# Patient Record
Sex: MALE | Race: BLACK OR AFRICAN AMERICAN | Employment: FULL TIME | URBAN - METROPOLITAN AREA
[De-identification: names, ages, dates, MRNs, and addresses within clinical notes are randomized per-mention and may not be internally consistent; named-entity substitution may affect disease eponyms.]

---

## 2017-02-01 ENCOUNTER — OFFICE VISIT (OUTPATIENT)
Dept: INTERNAL MEDICINE CLINIC | Age: 57
End: 2017-02-01

## 2017-02-01 VITALS
SYSTOLIC BLOOD PRESSURE: 140 MMHG | HEIGHT: 66 IN | DIASTOLIC BLOOD PRESSURE: 90 MMHG | BODY MASS INDEX: 23.01 KG/M2 | HEART RATE: 72 BPM | TEMPERATURE: 98.1 F | WEIGHT: 143.2 LBS

## 2017-02-01 DIAGNOSIS — M25.562 CHRONIC PAIN OF LEFT KNEE: ICD-10-CM

## 2017-02-01 DIAGNOSIS — N52.9 ERECTILE DYSFUNCTION, UNSPECIFIED ERECTILE DYSFUNCTION TYPE: ICD-10-CM

## 2017-02-01 DIAGNOSIS — M25.512 CHRONIC LEFT SHOULDER PAIN: ICD-10-CM

## 2017-02-01 DIAGNOSIS — I10 ESSENTIAL HYPERTENSION: Primary | ICD-10-CM

## 2017-02-01 DIAGNOSIS — G89.29 CHRONIC LEFT SHOULDER PAIN: ICD-10-CM

## 2017-02-01 DIAGNOSIS — Z13.1 SCREENING FOR DIABETES MELLITUS: ICD-10-CM

## 2017-02-01 DIAGNOSIS — M79.672 PAIN IN BOTH FEET: ICD-10-CM

## 2017-02-01 DIAGNOSIS — M79.671 PAIN IN BOTH FEET: ICD-10-CM

## 2017-02-01 DIAGNOSIS — G89.29 CHRONIC PAIN OF LEFT KNEE: ICD-10-CM

## 2017-02-01 LAB
GLUCOSE BLD-MCNC: 90 MG/DL
HBA1C MFR BLD: 4.7 %

## 2017-02-01 PROCEDURE — 99214 OFFICE O/P EST MOD 30 MIN: CPT | Performed by: NURSE PRACTITIONER

## 2017-02-01 PROCEDURE — 83036 HEMOGLOBIN GLYCOSYLATED A1C: CPT | Performed by: NURSE PRACTITIONER

## 2017-02-01 PROCEDURE — 82962 GLUCOSE BLOOD TEST: CPT | Performed by: NURSE PRACTITIONER

## 2017-02-01 RX ORDER — LISINOPRIL 20 MG/1
20 TABLET ORAL DAILY
Qty: 30 TABLET | Refills: 3 | Status: SHIPPED | OUTPATIENT
Start: 2017-02-01 | End: 2017-03-03 | Stop reason: SDUPTHER

## 2017-02-01 RX ORDER — BLOOD PRESSURE TEST KIT
KIT MISCELLANEOUS
Qty: 1 KIT | Refills: 0 | Status: SHIPPED | OUTPATIENT
Start: 2017-02-01 | End: 2017-08-01 | Stop reason: SDUPTHER

## 2017-02-01 RX ORDER — CHOLECALCIFEROL (VITAMIN D3) 50 MCG
2000 TABLET ORAL DAILY
COMMUNITY
End: 2017-03-03 | Stop reason: SDUPTHER

## 2017-02-01 RX ORDER — LABETALOL 300 MG/1
300 TABLET, FILM COATED ORAL DAILY
Qty: 30 TABLET | Refills: 2 | Status: SHIPPED | OUTPATIENT
Start: 2017-02-01 | End: 2017-03-03 | Stop reason: SDUPTHER

## 2017-02-01 ASSESSMENT — ENCOUNTER SYMPTOMS
ALLERGIC/IMMUNOLOGIC NEGATIVE: 1
RESPIRATORY NEGATIVE: 1
GASTROINTESTINAL NEGATIVE: 1
EYES NEGATIVE: 1

## 2017-02-02 LAB
SEX HORMONE BINDING GLOBULIN: 50 NMOL/L (ref 11–80)
TESTOSTERONE FREE PERCENT: 1.5 % (ref 1.6–2.9)
TESTOSTERONE FREE, CALC: 73 PG/ML (ref 47–244)
TESTOSTERONE TOTAL-MALE: 498 NG/DL (ref 300–890)

## 2017-02-03 ENCOUNTER — TELEPHONE (OUTPATIENT)
Dept: INTERNAL MEDICINE CLINIC | Age: 57
End: 2017-02-03

## 2017-02-03 DIAGNOSIS — R79.89 LOW TESTOSTERONE LEVEL IN MALE: ICD-10-CM

## 2017-03-03 ENCOUNTER — OFFICE VISIT (OUTPATIENT)
Dept: INTERNAL MEDICINE CLINIC | Age: 57
End: 2017-03-03

## 2017-03-03 VITALS
OXYGEN SATURATION: 98 % | WEIGHT: 146 LBS | SYSTOLIC BLOOD PRESSURE: 156 MMHG | BODY MASS INDEX: 23.46 KG/M2 | HEIGHT: 66 IN | HEART RATE: 58 BPM | DIASTOLIC BLOOD PRESSURE: 92 MMHG

## 2017-03-03 DIAGNOSIS — I10 ESSENTIAL HYPERTENSION: Primary | ICD-10-CM

## 2017-03-03 DIAGNOSIS — M19.90 ARTHRITIS: ICD-10-CM

## 2017-03-03 DIAGNOSIS — K21.9 GASTROESOPHAGEAL REFLUX DISEASE WITHOUT ESOPHAGITIS: ICD-10-CM

## 2017-03-03 PROCEDURE — 99214 OFFICE O/P EST MOD 30 MIN: CPT | Performed by: NURSE PRACTITIONER

## 2017-03-03 RX ORDER — AMLODIPINE BESYLATE 10 MG/1
10 TABLET ORAL DAILY
Qty: 30 TABLET | Refills: 3 | Status: SHIPPED | OUTPATIENT
Start: 2017-03-03 | End: 2017-04-10 | Stop reason: SDUPTHER

## 2017-03-03 RX ORDER — PANTOPRAZOLE SODIUM 40 MG/1
40 TABLET, DELAYED RELEASE ORAL
Qty: 30 TABLET | Refills: 3 | Status: SHIPPED | OUTPATIENT
Start: 2017-03-03 | End: 2017-04-10 | Stop reason: SDUPTHER

## 2017-03-03 RX ORDER — LABETALOL 300 MG/1
300 TABLET, FILM COATED ORAL DAILY
Qty: 30 TABLET | Refills: 2 | Status: SHIPPED | OUTPATIENT
Start: 2017-03-03 | End: 2017-04-10 | Stop reason: SDUPTHER

## 2017-03-03 RX ORDER — NICOTINE 21 MG/24HR
1 PATCH, TRANSDERMAL 24 HOURS TRANSDERMAL DAILY
Qty: 10 PATCH | Refills: 0 | Status: SHIPPED | OUTPATIENT
Start: 2017-03-03 | End: 2019-03-04 | Stop reason: SDUPTHER

## 2017-03-03 RX ORDER — LISINOPRIL 20 MG/1
20 TABLET ORAL DAILY
Qty: 30 TABLET | Refills: 3 | Status: SHIPPED | OUTPATIENT
Start: 2017-03-03 | End: 2017-04-10 | Stop reason: SDUPTHER

## 2017-03-03 RX ORDER — CHOLECALCIFEROL (VITAMIN D3) 50 MCG
2000 TABLET ORAL DAILY
Qty: 30 TABLET | Refills: 2 | Status: SHIPPED | OUTPATIENT
Start: 2017-03-03 | End: 2017-04-10 | Stop reason: SDUPTHER

## 2017-03-03 ASSESSMENT — ENCOUNTER SYMPTOMS
DIARRHEA: 0
SHORTNESS OF BREATH: 0
ABDOMINAL DISTENTION: 0
HEARTBURN: 1
NAUSEA: 0
ALLERGIC/IMMUNOLOGIC NEGATIVE: 1
BLURRED VISION: 0
EYES NEGATIVE: 1

## 2017-03-03 ASSESSMENT — PATIENT HEALTH QUESTIONNAIRE - PHQ9
2. FEELING DOWN, DEPRESSED OR HOPELESS: 0
SUM OF ALL RESPONSES TO PHQ QUESTIONS 1-9: 0
1. LITTLE INTEREST OR PLEASURE IN DOING THINGS: 0
SUM OF ALL RESPONSES TO PHQ9 QUESTIONS 1 & 2: 0

## 2017-04-10 ENCOUNTER — OFFICE VISIT (OUTPATIENT)
Dept: INTERNAL MEDICINE CLINIC | Age: 57
End: 2017-04-10

## 2017-04-10 VITALS
TEMPERATURE: 98.4 F | WEIGHT: 145 LBS | HEIGHT: 66 IN | SYSTOLIC BLOOD PRESSURE: 130 MMHG | DIASTOLIC BLOOD PRESSURE: 72 MMHG | BODY MASS INDEX: 23.3 KG/M2 | HEART RATE: 64 BPM

## 2017-04-10 DIAGNOSIS — I10 ESSENTIAL HYPERTENSION: Primary | ICD-10-CM

## 2017-04-10 DIAGNOSIS — M19.90 ARTHRITIS: ICD-10-CM

## 2017-04-10 DIAGNOSIS — K21.9 GASTROESOPHAGEAL REFLUX DISEASE WITHOUT ESOPHAGITIS: ICD-10-CM

## 2017-04-10 DIAGNOSIS — E55.9 VITAMIN D DEFICIENCY: ICD-10-CM

## 2017-04-10 PROCEDURE — 99214 OFFICE O/P EST MOD 30 MIN: CPT | Performed by: NURSE PRACTITIONER

## 2017-04-10 RX ORDER — PANTOPRAZOLE SODIUM 40 MG/1
40 TABLET, DELAYED RELEASE ORAL
Qty: 30 TABLET | Refills: 3 | Status: SHIPPED | OUTPATIENT
Start: 2017-04-10 | End: 2017-08-01 | Stop reason: SDUPTHER

## 2017-04-10 RX ORDER — MELOXICAM 7.5 MG/1
7.5 TABLET ORAL DAILY
Qty: 30 TABLET | Refills: 1 | Status: SHIPPED | OUTPATIENT
Start: 2017-04-10 | End: 2017-08-01 | Stop reason: SDUPTHER

## 2017-04-10 RX ORDER — BLOOD PRESSURE TEST KIT
KIT MISCELLANEOUS
Qty: 1 KIT | Refills: 0 | Status: SHIPPED | OUTPATIENT
Start: 2017-04-10 | End: 2019-01-16 | Stop reason: ALTCHOICE

## 2017-04-10 RX ORDER — LABETALOL 300 MG/1
300 TABLET, FILM COATED ORAL DAILY
Qty: 30 TABLET | Refills: 2 | Status: SHIPPED | OUTPATIENT
Start: 2017-04-10 | End: 2017-08-01 | Stop reason: SDUPTHER

## 2017-04-10 RX ORDER — TADALAFIL 5 MG/1
5 TABLET ORAL PRN
Qty: 30 TABLET | Refills: 2 | Status: SHIPPED | OUTPATIENT
Start: 2017-04-10 | End: 2018-12-06 | Stop reason: SDUPTHER

## 2017-04-10 RX ORDER — CHOLECALCIFEROL (VITAMIN D3) 50 MCG
2000 TABLET ORAL DAILY
Qty: 30 TABLET | Refills: 2 | Status: SHIPPED | OUTPATIENT
Start: 2017-04-10 | End: 2017-08-01 | Stop reason: SDUPTHER

## 2017-04-10 RX ORDER — LISINOPRIL 20 MG/1
20 TABLET ORAL DAILY
Qty: 30 TABLET | Refills: 3 | Status: SHIPPED | OUTPATIENT
Start: 2017-04-10 | End: 2017-08-01 | Stop reason: SDUPTHER

## 2017-04-10 RX ORDER — AMLODIPINE BESYLATE 10 MG/1
10 TABLET ORAL DAILY
Qty: 30 TABLET | Refills: 3 | Status: SHIPPED | OUTPATIENT
Start: 2017-04-10 | End: 2017-08-01 | Stop reason: SDUPTHER

## 2017-04-10 RX ORDER — MELOXICAM 7.5 MG/1
1 TABLET ORAL DAILY
COMMUNITY
Start: 2017-04-04 | End: 2017-04-10 | Stop reason: SDUPTHER

## 2017-04-10 ASSESSMENT — ENCOUNTER SYMPTOMS
RESPIRATORY NEGATIVE: 1
EYES NEGATIVE: 1
GASTROINTESTINAL NEGATIVE: 1
ALLERGIC/IMMUNOLOGIC NEGATIVE: 1

## 2017-04-11 DIAGNOSIS — I10 ESSENTIAL HYPERTENSION: Primary | ICD-10-CM

## 2017-08-01 ENCOUNTER — OFFICE VISIT (OUTPATIENT)
Dept: INTERNAL MEDICINE CLINIC | Age: 57
End: 2017-08-01

## 2017-08-01 VITALS
BODY MASS INDEX: 23.76 KG/M2 | SYSTOLIC BLOOD PRESSURE: 140 MMHG | DIASTOLIC BLOOD PRESSURE: 88 MMHG | WEIGHT: 142.6 LBS | HEART RATE: 64 BPM | TEMPERATURE: 98.3 F | HEIGHT: 65 IN

## 2017-08-01 DIAGNOSIS — Z00.00 HEALTH CARE MAINTENANCE: ICD-10-CM

## 2017-08-01 DIAGNOSIS — I10 ESSENTIAL HYPERTENSION: Primary | ICD-10-CM

## 2017-08-01 DIAGNOSIS — I10 ESSENTIAL HYPERTENSION: ICD-10-CM

## 2017-08-01 DIAGNOSIS — E55.9 VITAMIN D DEFICIENCY: ICD-10-CM

## 2017-08-01 DIAGNOSIS — K21.9 GASTROESOPHAGEAL REFLUX DISEASE WITHOUT ESOPHAGITIS: ICD-10-CM

## 2017-08-01 DIAGNOSIS — Z13.1 SCREENING FOR DIABETES MELLITUS: ICD-10-CM

## 2017-08-01 DIAGNOSIS — F17.210 CIGARETTE NICOTINE DEPENDENCE WITHOUT COMPLICATION: ICD-10-CM

## 2017-08-01 DIAGNOSIS — M19.90 ARTHRITIS: ICD-10-CM

## 2017-08-01 LAB
A/G RATIO: 1.8 (ref 1.1–2.2)
ALBUMIN SERPL-MCNC: 4.7 G/DL (ref 3.4–5)
ALP BLD-CCNC: 78 U/L (ref 40–129)
ALT SERPL-CCNC: 15 U/L (ref 10–40)
ANION GAP SERPL CALCULATED.3IONS-SCNC: 16 MMOL/L (ref 3–16)
AST SERPL-CCNC: 23 U/L (ref 15–37)
BILIRUB SERPL-MCNC: 0.3 MG/DL (ref 0–1)
BUN BLDV-MCNC: 19 MG/DL (ref 7–20)
CALCIUM SERPL-MCNC: 9.9 MG/DL (ref 8.3–10.6)
CHLORIDE BLD-SCNC: 99 MMOL/L (ref 99–110)
CHOLESTEROL, TOTAL: 168 MG/DL (ref 0–199)
CO2: 24 MMOL/L (ref 21–32)
CREAT SERPL-MCNC: 0.7 MG/DL (ref 0.9–1.3)
CREATININE URINE: 118.9 MG/DL (ref 39–259)
GFR AFRICAN AMERICAN: >60
GFR NON-AFRICAN AMERICAN: >60
GLOBULIN: 2.6 G/DL
GLUCOSE BLD-MCNC: 85 MG/DL (ref 70–99)
HAV IGM SER IA-ACNC: NORMAL
HCT VFR BLD CALC: 39.2 % (ref 40.5–52.5)
HDLC SERPL-MCNC: 65 MG/DL (ref 40–60)
HEMOGLOBIN: 12.9 G/DL (ref 13.5–17.5)
HEPATITIS B CORE IGM ANTIBODY: NORMAL
HEPATITIS B SURFACE ANTIGEN INTERPRETATION: NORMAL
HEPATITIS C ANTIBODY INTERPRETATION: NORMAL
LDL CHOLESTEROL CALCULATED: 87 MG/DL
MCH RBC QN AUTO: 30.1 PG (ref 26–34)
MCHC RBC AUTO-ENTMCNC: 32.8 G/DL (ref 31–36)
MCV RBC AUTO: 91.9 FL (ref 80–100)
MICROALBUMIN UR-MCNC: <1.2 MG/DL
MICROALBUMIN/CREAT UR-RTO: NORMAL MG/G (ref 0–30)
PDW BLD-RTO: 14.6 % (ref 12.4–15.4)
PLATELET # BLD: 182 K/UL (ref 135–450)
PMV BLD AUTO: 10.5 FL (ref 5–10.5)
POTASSIUM SERPL-SCNC: 4.3 MMOL/L (ref 3.5–5.1)
RBC # BLD: 4.27 M/UL (ref 4.2–5.9)
SODIUM BLD-SCNC: 139 MMOL/L (ref 136–145)
TOTAL PROTEIN: 7.3 G/DL (ref 6.4–8.2)
TRIGL SERPL-MCNC: 82 MG/DL (ref 0–150)
VITAMIN D 25-HYDROXY: 48.2 NG/ML
VLDLC SERPL CALC-MCNC: 16 MG/DL
WBC # BLD: 6.1 K/UL (ref 4–11)

## 2017-08-01 PROCEDURE — 99214 OFFICE O/P EST MOD 30 MIN: CPT | Performed by: NURSE PRACTITIONER

## 2017-08-01 RX ORDER — CHOLECALCIFEROL (VITAMIN D3) 50 MCG
2000 TABLET ORAL DAILY
Qty: 30 TABLET | Refills: 2 | Status: SHIPPED | OUTPATIENT
Start: 2017-08-01 | End: 2018-01-16 | Stop reason: SDUPTHER

## 2017-08-01 RX ORDER — LABETALOL 300 MG/1
300 TABLET, FILM COATED ORAL DAILY
Qty: 30 TABLET | Refills: 2 | Status: SHIPPED | OUTPATIENT
Start: 2017-08-01 | End: 2018-02-01 | Stop reason: SDUPTHER

## 2017-08-01 RX ORDER — PANTOPRAZOLE SODIUM 40 MG/1
40 TABLET, DELAYED RELEASE ORAL
Qty: 30 TABLET | Refills: 3 | Status: SHIPPED | OUTPATIENT
Start: 2017-08-01 | End: 2018-02-01

## 2017-08-01 RX ORDER — AMLODIPINE BESYLATE 10 MG/1
10 TABLET ORAL DAILY
Qty: 30 TABLET | Refills: 3 | Status: SHIPPED | OUTPATIENT
Start: 2017-08-01 | End: 2018-02-01 | Stop reason: SDUPTHER

## 2017-08-01 RX ORDER — MELOXICAM 7.5 MG/1
7.5 TABLET ORAL DAILY
Qty: 30 TABLET | Refills: 1 | Status: SHIPPED | OUTPATIENT
Start: 2017-08-01 | End: 2018-01-16 | Stop reason: SDUPTHER

## 2017-08-01 RX ORDER — LISINOPRIL 20 MG/1
20 TABLET ORAL DAILY
Qty: 30 TABLET | Refills: 3 | Status: SHIPPED | OUTPATIENT
Start: 2017-08-01 | End: 2018-01-16 | Stop reason: SDUPTHER

## 2017-08-01 ASSESSMENT — ENCOUNTER SYMPTOMS
WHEEZING: 0
ALLERGIC/IMMUNOLOGIC NEGATIVE: 1
ABDOMINAL DISTENTION: 0
COUGH: 0
CHEST TIGHTNESS: 0
ABDOMINAL PAIN: 0
SORE THROAT: 0
SHORTNESS OF BREATH: 0
DIARRHEA: 0
HOARSE VOICE: 0
STRIDOR: 0
APNEA: 0
BELCHING: 0
NAUSEA: 0
HEARTBURN: 0

## 2017-08-02 LAB — HIV-1 AND HIV-2 ANTIBODIES: NORMAL

## 2017-08-03 ENCOUNTER — TELEPHONE (OUTPATIENT)
Dept: INTERNAL MEDICINE CLINIC | Age: 57
End: 2017-08-03

## 2017-08-03 LAB
PROSTATE SPECIFIC ANTIGEN FREE: <0.1 UG/L
PROSTATE SPECIFIC ANTIGEN PERCENT FREE: 20 %
PROSTATE SPECIFIC ANTIGEN: 0.2 UG/L (ref 0–4)

## 2017-08-24 DIAGNOSIS — I10 ESSENTIAL HYPERTENSION: ICD-10-CM

## 2017-09-14 ENCOUNTER — OFFICE VISIT (OUTPATIENT)
Dept: INTERNAL MEDICINE CLINIC | Age: 57
End: 2017-09-14

## 2017-09-14 VITALS
OXYGEN SATURATION: 97 % | WEIGHT: 143.6 LBS | HEART RATE: 59 BPM | DIASTOLIC BLOOD PRESSURE: 72 MMHG | BODY MASS INDEX: 23.93 KG/M2 | RESPIRATION RATE: 20 BRPM | SYSTOLIC BLOOD PRESSURE: 132 MMHG | HEIGHT: 65 IN | TEMPERATURE: 98.3 F

## 2017-09-14 DIAGNOSIS — R09.89 CHEST CONGESTION: Primary | ICD-10-CM

## 2017-09-14 DIAGNOSIS — R05.9 COUGH: ICD-10-CM

## 2017-09-14 DIAGNOSIS — Z72.0 TOBACCO USER: ICD-10-CM

## 2017-09-14 PROCEDURE — 99213 OFFICE O/P EST LOW 20 MIN: CPT | Performed by: NURSE PRACTITIONER

## 2017-09-14 RX ORDER — GUAIFENESIN 600 MG/1
600 TABLET, EXTENDED RELEASE ORAL 2 TIMES DAILY
Qty: 20 TABLET | Refills: 0 | Status: SHIPPED | OUTPATIENT
Start: 2017-09-14 | End: 2017-10-13 | Stop reason: SDUPTHER

## 2017-09-14 RX ORDER — CETIRIZINE HYDROCHLORIDE, PSEUDOEPHEDRINE HYDROCHLORIDE 5; 120 MG/1; MG/1
1 TABLET, FILM COATED, EXTENDED RELEASE ORAL 2 TIMES DAILY
Qty: 20 TABLET | Refills: 0 | Status: SHIPPED | OUTPATIENT
Start: 2017-09-14 | End: 2017-09-24

## 2017-09-14 RX ORDER — CETIRIZINE HYDROCHLORIDE, PSEUDOEPHEDRINE HYDROCHLORIDE 5; 120 MG/1; MG/1
1 TABLET, FILM COATED, EXTENDED RELEASE ORAL 2 TIMES DAILY
Qty: 60 TABLET | Refills: 0 | Status: SHIPPED | OUTPATIENT
Start: 2017-09-14 | End: 2017-09-14 | Stop reason: CLARIF

## 2017-09-14 RX ORDER — ECHINACEA PURPUREA EXTRACT 125 MG
1 TABLET ORAL PRN
Qty: 1 BOTTLE | Refills: 3 | Status: SHIPPED | OUTPATIENT
Start: 2017-09-14 | End: 2019-01-16 | Stop reason: ALTCHOICE

## 2017-09-14 ASSESSMENT — ENCOUNTER SYMPTOMS
VOICE CHANGE: 0
SINUS PRESSURE: 1
APNEA: 0
STRIDOR: 0
WHEEZING: 0
SHORTNESS OF BREATH: 0
CHEST TIGHTNESS: 0
COUGH: 1
FACIAL SWELLING: 0
TROUBLE SWALLOWING: 0
SORE THROAT: 0
RHINORRHEA: 0
CHOKING: 0

## 2017-09-19 DIAGNOSIS — F17.210 CIGARETTE NICOTINE DEPENDENCE WITHOUT COMPLICATION: Primary | ICD-10-CM

## 2017-09-26 ENCOUNTER — HOSPITAL ENCOUNTER (OUTPATIENT)
Dept: CT IMAGING | Age: 57
Discharge: HOME | End: 2017-09-26
Attending: NURSE PRACTITIONER | Admitting: NURSE PRACTITIONER

## 2017-09-26 DIAGNOSIS — Z72.0 TOBACCO USE: ICD-10-CM

## 2017-09-26 DIAGNOSIS — F17.210 CIGARETTE NICOTINE DEPENDENCE WITHOUT COMPLICATION: ICD-10-CM

## 2017-09-27 ENCOUNTER — TELEPHONE (OUTPATIENT)
Dept: INTERNAL MEDICINE CLINIC | Age: 57
End: 2017-09-27

## 2017-09-28 PROBLEM — J43.9 EMPHYSEMA LUNG (HCC): Status: ACTIVE | Noted: 2017-09-28

## 2017-10-13 DIAGNOSIS — R05.9 COUGH: ICD-10-CM

## 2017-10-13 DIAGNOSIS — R09.89 CHEST CONGESTION: ICD-10-CM

## 2017-10-16 RX ORDER — GUAIFENESIN 600 MG/1
600 TABLET, EXTENDED RELEASE ORAL 2 TIMES DAILY
Qty: 20 TABLET | Refills: 0 | Status: SHIPPED | OUTPATIENT
Start: 2017-10-16 | End: 2018-07-11 | Stop reason: SDUPTHER

## 2017-10-16 RX ORDER — CETIRIZINE HYDROCHLORIDE, PSEUDOEPHEDRINE HYDROCHLORIDE 5; 120 MG/1; MG/1
1 TABLET, FILM COATED, EXTENDED RELEASE ORAL 2 TIMES DAILY
Qty: 60 TABLET | Refills: 0 | Status: SHIPPED | OUTPATIENT
Start: 2017-10-16 | End: 2019-03-07 | Stop reason: SDUPTHER

## 2017-11-01 ENCOUNTER — OFFICE VISIT (OUTPATIENT)
Dept: INTERNAL MEDICINE CLINIC | Age: 57
End: 2017-11-01

## 2017-11-01 VITALS
SYSTOLIC BLOOD PRESSURE: 130 MMHG | DIASTOLIC BLOOD PRESSURE: 80 MMHG | BODY MASS INDEX: 23.86 KG/M2 | HEIGHT: 65 IN | WEIGHT: 143.2 LBS | HEART RATE: 64 BPM | TEMPERATURE: 98.5 F

## 2017-11-01 DIAGNOSIS — G89.29 CHRONIC LEFT SHOULDER PAIN: Primary | ICD-10-CM

## 2017-11-01 DIAGNOSIS — M25.512 CHRONIC LEFT SHOULDER PAIN: Primary | ICD-10-CM

## 2017-11-01 PROCEDURE — 3017F COLORECTAL CA SCREEN DOC REV: CPT | Performed by: NURSE PRACTITIONER

## 2017-11-01 PROCEDURE — 4004F PT TOBACCO SCREEN RCVD TLK: CPT | Performed by: NURSE PRACTITIONER

## 2017-11-01 PROCEDURE — 96372 THER/PROPH/DIAG INJ SC/IM: CPT | Performed by: NURSE PRACTITIONER

## 2017-11-01 PROCEDURE — G8420 CALC BMI NORM PARAMETERS: HCPCS | Performed by: NURSE PRACTITIONER

## 2017-11-01 PROCEDURE — G8484 FLU IMMUNIZE NO ADMIN: HCPCS | Performed by: NURSE PRACTITIONER

## 2017-11-01 PROCEDURE — G8427 DOCREV CUR MEDS BY ELIG CLIN: HCPCS | Performed by: NURSE PRACTITIONER

## 2017-11-01 PROCEDURE — 99213 OFFICE O/P EST LOW 20 MIN: CPT | Performed by: NURSE PRACTITIONER

## 2017-11-01 RX ORDER — TRIAMCINOLONE ACETONIDE 40 MG/ML
40 INJECTION, SUSPENSION INTRA-ARTICULAR; INTRAMUSCULAR ONCE
Status: COMPLETED | OUTPATIENT
Start: 2017-11-01 | End: 2017-11-01

## 2017-11-01 RX ADMIN — TRIAMCINOLONE ACETONIDE 40 MG: 40 INJECTION, SUSPENSION INTRA-ARTICULAR; INTRAMUSCULAR at 17:34

## 2017-11-01 ASSESSMENT — ENCOUNTER SYMPTOMS: RESPIRATORY NEGATIVE: 1

## 2017-11-01 NOTE — PROGRESS NOTES
Subjective:      Patient ID: Jayden Landa is a 56 y.o. male who presents for left shoulder pain. Patient reports \"I can't see the other Orthopedic doctor you referred me to [facility] 2018\". Patient requesting a referral to another Orthopedic doctor. Patient denies CP/SOB at present. Patient is in no distress. Chief Complaint   Patient presents with    Follow-up     patient here for followup, left shoulder pain x 2-3 months    Shoulder Pain     Vitals:    11/01/17 1636   BP: 130/80   Site: Right Arm   Position: Sitting   Cuff Size: Small Adult   Pulse: 64   Temp: 98.5 °F (36.9 °C)   TempSrc: Oral   Weight: 143 lb 3.2 oz (65 kg)   Height: 5' 4.5\" (1.638 m)     Current Outpatient Prescriptions   Medication Sig Dispense Refill    guaiFENesin (MUCINEX) 600 MG extended release tablet Take 1 tablet by mouth 2 times daily 20 tablet 0    cetirizine-psuedoephedrine (WAL-ZYR D) 5-120 MG per extended release tablet Take 1 tablet by mouth 2 times daily 60 tablet 0    aspirin 81 MG tablet Take 1 tablet by mouth daily 30 tablet 2    meloxicam (MOBIC) 7.5 MG tablet Take 1 tablet by mouth daily Take 1 tablet daily 30 tablet 1    Cholecalciferol (VITAMIN D) 2000 units TABS tablet Take 1 tablet by mouth daily 30 tablet 2    labetalol (NORMODYNE) 300 MG tablet Take 1 tablet by mouth daily 30 tablet 2    amLODIPine (NORVASC) 10 MG tablet Take 1 tablet by mouth daily 30 tablet 3    lisinopril (PRINIVIL) 20 MG tablet Take 1 tablet by mouth daily 30 tablet 3    sodium chloride (ALTAMIST SPRAY) 0.65 % nasal spray 1 spray by Nasal route as needed for Congestion 1 Bottle 3    pantoprazole (PROTONIX) 40 MG tablet Take 1 tablet by mouth every morning (before breakfast) 30 tablet 3    Blood Pressure KIT Check blood pressure once daily in the mornings. 1 kit 0    tadalafil (CIALIS) 5 MG tablet Take 1 tablet by mouth as needed for Erectile Dysfunction Take one tablet daily as needed for erectile dysfunction.  30 tablet 2  nicotine (NICODERM CQ) 14 MG/24HR Place 1 patch onto the skin daily 10 patch 0    scopolamine (TRANSDERM-SCOP) transdermal patch Place 1 patch onto the skin every 72 hours 10 patch 1    ondansetron (ZOFRAN) 4 MG tablet Take 1 tablet by mouth every 8 hours as needed for Nausea 20 tablet 1    nystatin (MYCOSTATIN) 277996 UNIT/ML suspension Take 5 mLs by mouth 4 times daily 180 mL 0     Current Facility-Administered Medications   Medication Dose Route Frequency Provider Last Rate Last Dose    ketorolac (TORADOL) injection 30 mg  30 mg Intramuscular Once Kajal Nishant, CNP        triamcinolone acetonide (KENALOG-40) injection 40 mg  40 mg Intramuscular Once Kajal Hu, CNP             Shoulder Pain    The pain is present in the left shoulder. This is a chronic problem. The current episode started more than 1 month ago. The problem occurs constantly. The problem has been unchanged. The quality of the pain is described as burning and aching. The pain is at a severity of 8/10. The pain is moderate. The symptoms are aggravated by activity and lying down. He has tried acetaminophen and NSAIDS for the symptoms. The treatment provided moderate relief. Review of Systems   Constitutional: Negative. HENT: Negative. Respiratory: Negative. Cardiovascular: Negative. Musculoskeletal: Positive for arthralgias. Psychiatric/Behavioral: Negative. All other systems reviewed and are negative. Objective:   Physical Exam   Constitutional: He is oriented to person, place, and time. He appears well-developed and well-nourished. No distress. HENT:   Head: Atraumatic. Eyes: Conjunctivae are normal.   Neck: Normal range of motion. Neck supple. Cardiovascular: Normal rate, regular rhythm and normal heart sounds. Pulmonary/Chest: Effort normal and breath sounds normal. No respiratory distress. Musculoskeletal: Normal range of motion.    Neurological: He is alert and oriented to person, place, and time. Skin: Skin is warm and dry. No rash noted. He is not diaphoretic. No erythema. No pallor. Psychiatric: He has a normal mood and affect. His behavior is normal. Judgment and thought content normal.   Nursing note and vitals reviewed. Assessment:      Left shoulder pain, worsening. Moose Cullen received counseling on the following healthy behaviors: nutrition, exercise, medication adherence and tobacco cessation    Patient given educational materials on Smoking Cessation, Nutrition, Exercise and Hypertension    I have instructed Jarethon Min to complete a self tracking handout on Blood Pressures  and Smoking and instructed them to bring it with them to his next appointment. Discussed use, benefit, and side effects of prescribed medications. Barriers to medication compliance addressed. All patient questions answered. Pt voiced understanding. Patient also educated on the importance of being compliant with routine office visits in order to assess chronic illness progression, medication regimen/side effects, and effectiveness of plan of care. Patient was able to verbalize understanding. More than half the time spent on counseling. Patient is in no distress at time of departure. Patient made aware of CT Lung Scan results, and need for 1 year f/u lung scan. Patient verbalized understanding. Plan:       Dash Diet  Diet and Exercise. Smoking Cessation ([id number]). Drink 64 ounces of water daily. Eat 3-5 servings of vegetables and fruits daily. Take all medications as prescribed. Toradol / Kenalog injection now. Schedule appointment with Dr. Kyle Knight (Orthopedic). Call 911 or go to the nearest emergency room for chest pain or shortness of breath. Return in 3 months f/u HTN.

## 2017-11-01 NOTE — PATIENT INSTRUCTIONS
your body in the same way over and over again. This is called overuse. In some cases, the cause of the pain is another health problem such as arthritis or fibromyalgia. The doctor will examine you and ask you questions about your health to help find the cause of your pain. Blood tests or imaging tests like an X-ray may also be helpful. But sometimes doctors can't find a cause of the pain. Treatment depends on your symptoms and the cause of the pain, if known. The doctor has checked you carefully, but problems can develop later. If you notice any problems or new symptoms, get medical treatment right away. Follow-up care is a key part of your treatment and safety. Be sure to make and go to all appointments, and call your doctor if you are having problems. It's also a good idea to know your test results and keep a list of the medicines you take. How can you care for yourself at home? · Rest until you feel better. · Do not do anything that makes the pain worse. Return to exercise gradually if you feel better and your doctor says it's okay. · Be safe with medicines. Read and follow all instructions on the label. ¨ If the doctor gave you a prescription medicine for pain, take it as prescribed. ¨ If you are not taking a prescription pain medicine, ask your doctor if you can take an over-the-counter medicine. · Put ice or a cold pack on the area for 10 to 20 minutes at a time to ease pain. Put a thin cloth between the ice and your skin. When should you call for help? Call your doctor now or seek immediate medical care if:  · You have new pain, or your pain gets worse. · You have new symptoms such as a fever, a rash, or chills. Watch closely for changes in your health, and be sure to contact your doctor if:  · You do not get better as expected. Where can you learn more? Go to https://henry.Offerti. org and sign in to your [employer] account.  Enter V344 in the [employer] box to learn there is no swelling, you can put moist heat, a heating pad, or a warm cloth on your shoulder. Some doctors suggest alternating between hot and cold. · Rest your shoulder for a few days. If your doctor recommends it, you can then begin gentle exercise of the shoulder, but do not lift anything heavy. When should you call for help? Call 911 anytime you think you may need emergency care. For example, call if:  · You have chest pain or pressure. This may occur with:  ¨ Sweating. ¨ Shortness of breath. ¨ Nausea or vomiting. ¨ Pain that spreads from the chest to the neck, jaw, or one or both shoulders or arms. ¨ Dizziness or lightheadedness. ¨ A fast or uneven pulse. After calling 911, chew 1 adult-strength aspirin. Wait for an ambulance. Do not try to drive yourself. · Your arm or hand is cool or pale or changes color. Call your doctor now or seek immediate medical care if:  · You have signs of infection, such as:  ¨ Increased pain, swelling, warmth, or redness in your shoulder. ¨ Red streaks leading from a place on your shoulder. ¨ Pus draining from an area of your shoulder. ¨ Swollen lymph nodes in your neck, armpits, or groin. ¨ A fever. Watch closely for changes in your health, and be sure to contact your doctor if:  · You cannot use your shoulder. · Your shoulder does not get better as expected. Where can you learn more? Go to https://Qbaka.Docitt. org and sign in to your [employer] account. Enter P333 in the Clinkle box to learn more about \"Shoulder Pain: Care Instructions. \"     If you do not have an account, please click on the \"Sign Up Now\" link. Current as of: March 21, 2017  Content Version: 11.3  © 9804-0848 [employer]. Care instructions adapted under license by [facility] ([facility]).  If you have questions about a medical condition or this instruction, always ask your healthcare professional. Xochitl Mitchell any warranty or liability for exercise using activities that do not strain or hurt the painful joint. When should you call for help? Call your doctor now or seek immediate medical care if:  · You have signs of infection, such as:  ¨ Increased pain, swelling, warmth, and redness. ¨ Red streaks leading from the joint. ¨ A fever. Watch closely for changes in your health, and be sure to contact your doctor if:  · Your movement or symptoms are not getting better after 1 to 2 weeks of home treatment. Where can you learn more? Go to https://FraxionpeAcusphere.xzoops. org and sign in to your [employer] account. Enter P205 in the [employer] box to learn more about \"Joint Pain: Care Instructions. \"     If you do not have an account, please click on the \"Sign Up Now\" link. Current as of: March 21, 2017  Content Version: 11.3  © 1009-5309 [employer]. Care instructions adapted under license by [facility] ([facility]). If you have questions about a medical condition or this instruction, always ask your healthcare professional. [address] any warranty or liability for your use of this information. Patient Education        DASH Diet: Care Instructions  Your Care Instructions  The DASH diet is an eating plan that can help lower your blood pressure. DASH stands for Dietary Approaches to Stop Hypertension. Hypertension is high blood pressure. The DASH diet focuses on eating foods that are high in calcium, potassium, and magnesium. These nutrients can lower blood pressure. The foods that are highest in these nutrients are fruits, vegetables, low-fat dairy products, nuts, seeds, and legumes. But taking calcium, potassium, and magnesium supplements instead of eating foods that are high in those nutrients does not have the same effect. The DASH diet also includes whole grains, fish, and poultry. The DASH diet is one of several lifestyle changes your doctor may recommend to lower your high blood pressure.  Your doctor may also want you to decrease the amount of sodium in your diet. Lowering sodium while following the DASH diet can lower blood pressure even further than just the DASH diet alone. Follow-up care is a key part of your treatment and safety. Be sure to make and go to all appointments, and call your doctor if you are having problems. It's also a good idea to know your test results and keep a list of the medicines you take. How can you care for yourself at home? Following the DASH diet  · Eat 4 to 5 servings of fruit each day. A serving is 1 medium-sized piece of fruit, ½ cup chopped or canned fruit, 1/4 cup dried fruit, or 4 ounces (½ cup) of fruit juice. Choose fruit more often than fruit juice. · Eat 4 to 5 servings of vegetables each day. A serving is 1 cup of lettuce or raw leafy vegetables, ½ cup of chopped or cooked vegetables, or 4 ounces (½ cup) of vegetable juice. Choose vegetables more often than vegetable juice. · Get 2 to 3 servings of low-fat and fat-free dairy each day. A serving is 8 ounces of milk, 1 cup of yogurt, or 1 ½ ounces of cheese. · Eat 6 to 8 servings of grains each day. A serving is 1 slice of bread, 1 ounce of dry cereal, or ½ cup of cooked rice, pasta, or cooked cereal. Try to choose whole-grain products as much as possible. · Limit lean meat, poultry, and fish to 2 servings each day. A serving is 3 ounces, about the size of a deck of cards. · Eat 4 to 5 servings of nuts, seeds, and legumes (cooked dried beans, lentils, and split peas) each week. A serving is 1/3 cup of nuts, 2 tablespoons of seeds, or ½ cup of cooked beans or peas. · Limit fats and oils to 2 to 3 servings each day. A serving is 1 teaspoon of vegetable oil or 2 tablespoons of salad dressing. · Limit sweets and added sugars to 5 servings or less a week. A serving is 1 tablespoon jelly or jam, ½ cup sorbet, or 1 cup of lemonade. · Eat less than 2,300 milligrams (mg) of sodium a day.  If you limit your sodium

## 2017-11-17 ENCOUNTER — HOSPITAL ENCOUNTER (OUTPATIENT)
Dept: GENERAL RADIOLOGY | Facility: MEDICAL CENTER | Age: 57
Discharge: HOME | End: 2017-11-17
Attending: ORTHOPAEDIC SURGERY | Admitting: ORTHOPAEDIC SURGERY

## 2017-11-17 ENCOUNTER — OFFICE VISIT (OUTPATIENT)
Dept: ORTHOPEDIC SURGERY | Age: 57
End: 2017-11-17

## 2017-11-17 VITALS
HEIGHT: 66 IN | SYSTOLIC BLOOD PRESSURE: 175 MMHG | BODY MASS INDEX: 22.5 KG/M2 | HEART RATE: 59 BPM | WEIGHT: 140 LBS | DIASTOLIC BLOOD PRESSURE: 108 MMHG

## 2017-11-17 DIAGNOSIS — M75.52 BURSITIS OF LEFT SHOULDER: ICD-10-CM

## 2017-11-17 DIAGNOSIS — M54.2 PAIN RADIATING TO NECK: ICD-10-CM

## 2017-11-17 DIAGNOSIS — G89.29 CHRONIC LEFT SHOULDER PAIN: ICD-10-CM

## 2017-11-17 DIAGNOSIS — M19.012 ARTHRITIS OF LEFT ACROMIOCLAVICULAR JOINT: ICD-10-CM

## 2017-11-17 DIAGNOSIS — M75.22 BICEPS TENDINITIS OF LEFT UPPER EXTREMITY: ICD-10-CM

## 2017-11-17 DIAGNOSIS — M25.512 CHRONIC LEFT SHOULDER PAIN: ICD-10-CM

## 2017-11-17 DIAGNOSIS — M25.512 CHRONIC LEFT SHOULDER PAIN: Primary | ICD-10-CM

## 2017-11-17 DIAGNOSIS — G89.29 CHRONIC LEFT SHOULDER PAIN: Primary | ICD-10-CM

## 2017-11-17 DIAGNOSIS — M75.82 ROTATOR CUFF TENDINITIS, LEFT: ICD-10-CM

## 2017-11-17 PROCEDURE — 3017F COLORECTAL CA SCREEN DOC REV: CPT | Performed by: PHYSICIAN ASSISTANT

## 2017-11-17 PROCEDURE — G8427 DOCREV CUR MEDS BY ELIG CLIN: HCPCS | Performed by: PHYSICIAN ASSISTANT

## 2017-11-17 PROCEDURE — 99202 OFFICE O/P NEW SF 15 MIN: CPT | Performed by: PHYSICIAN ASSISTANT

## 2017-11-17 PROCEDURE — G8420 CALC BMI NORM PARAMETERS: HCPCS | Performed by: PHYSICIAN ASSISTANT

## 2017-11-17 PROCEDURE — 72020 X-RAY EXAM OF SPINE 1 VIEW: CPT | Performed by: PHYSICIAN ASSISTANT

## 2017-11-17 PROCEDURE — 73030 X-RAY EXAM OF SHOULDER: CPT | Performed by: PHYSICIAN ASSISTANT

## 2017-11-17 PROCEDURE — G8484 FLU IMMUNIZE NO ADMIN: HCPCS | Performed by: PHYSICIAN ASSISTANT

## 2017-11-17 NOTE — PROGRESS NOTES
Chyna Anglin PA-C  Orthopaedic Surgery & Sports Medicine      [x] [address] [facility]   [] [facility] OFFICE  [address]  [address]    [place], [address] [facility]          [place], [address]    [phone] Option 3   [phone] Option [phone] (fax)    [phone] (fax)       PATIENT: Marilyn Sauer    56 y.o.  male  [facility]: 1960   MRN:  [id number]       Date of current encounter: 11/17/2017  This encounter is evaluated as a:       [x] New Patient Visit    [] Established Patient Visit   [] Post-Op Visit     [x] Consult: requested by Ambar Vázquez CNP for evaluation and treatment of his left shoulder.        [] Worker's Comp       Patient's PCP is Dr. Ambar Vázquez CNP    Subjective:     Chief Complaint   Patient presents with    Shoulder Pain     evaluation of left shoulder        HPI:  Marilyn Sauer is a 56y.o. year old, right hand dominant  male complaining of left shoulder pain. This is not due to an injury. This is not due to a work injury. When he was a teenager he states that he had a mass removed from the anterior aspect of his left shoulder. After it was removed he did not have any other issues with shoulder. He states that he has had left shoulder pain for over a year. He has seen his primary care physician who has given him injections in the shoulder, which have helped. The last injection he received on 11/1 one did not help as much as the others. He has never done physical therapy for his shoulder. He denies numbness and tingling down his upper extremity. He rates his pain as an 8 out of 10 at rest and with activity. He describes his pain as throbbing and cracking. It is persistent and constant. Bending, stretching and stretching aggravate his pain. He does feel his shoulder is limiting his behavior some. He does have night pain. He does have morning stiffness.      PAIN ASSESSMENT:   Pain Assessment  Location of Pain: Shoulder  Location (PRINIVIL) 20 MG tablet Take 1 tablet by mouth daily  Sherly Erwin CNP   Blood Pressure KIT Check blood pressure once daily in the mornings. Sherly Erwin CNP   tadalafil (CIALIS) 5 MG tablet Take 1 tablet by mouth as needed for Erectile Dysfunction Take one tablet daily as needed for erectile dysfunction. Sherly Erwin CNP   nicotine (NICODERM CQ) 14 MG/24HR Place 1 patch onto the skin daily  Sherly Erwin CNP   scopolamine (TRANSDERM-SCOP) transdermal patch Place 1 patch onto the skin every 72 hours  Tay Crespo MD   ondansetron (ZOFRAN) 4 MG tablet Take 1 tablet by mouth every 8 hours as needed for Nausea  Tay Crespo MD   nystatin (MYCOSTATIN) 836098 UNIT/ML suspension Take 5 mLs by mouth 4 times daily  Sabine Galicia MD     Social History     Social History    Marital status: Single     Spouse name: N/A    Number of children: N/A    Years of education: N/A     Occupational History    Not on file. Social History Main Topics    Smoking status: Current Every Day Smoker     Packs/day: 1.00     Years: 35.00     Types: Cigarettes    Smokeless tobacco: Never Used    Alcohol use No    Drug use: No    Sexual activity: Yes     Partners: Female     Other Topics Concern    Not on file     Social History Narrative    No narrative on file     Family History   Problem Relation Age of Onset    High Blood Pressure Mother        Review of Systems (ROS):    [x]Performed. Samir Overton's review of systems has been performed by intake and observation. An updated ROS was scanned into the media tab of the chart today 11/17/2017 . He has been instructed to contact his primary care physician regarding ROS issues if not already being addressed at this time. There are no recent changes.      Objective:   Physical Exam  Vital Signs:  BP (!) 175/108   Pulse 59   Ht 5' 6\" (1.676 m)   Wt 140 lb (63.5 kg)   BMI 22.60 kg/m²     Constitution:  Generally, Jordi Quick is [x] alert, [x] appears anterior/lateral deltoid  [] mild  [x] moderate  [] severe  [x] A-C Joint  [x] Proximal Biceps  [x] Subacromial Space/Anterior Deltoid    Provocative Tests for Subacromial Impingement:   [] Negative:   Positive Tests:  [x] Neer (Impingement) Test   [x] Villa-Elmo (Impingement) Test     Provocative Tests for Rotator Cuff Tears/Pathology:  [x] Negative: Empty can, drop arm, lift off, anterior superior escape sign  Positive Tests:  [] Empty Can (Марина) Test (Supraspin)   [] Drop Arm Test (Supraspin-Massive)   []  Lift Off Test Lugenia Aiden)   [] Anterior Superior Escape Sign (Massive)     Provocative Tests for Biceps Tendon/SLAP:   [] Negative   Positive Tests:  [x] Speed's Test (Biceps)      Provocative Tests for [facility] Joint Pathology:  [] Negative   Positive Test:  [x] Cross Body ADD Test     Motor Function:  [] No gross motor weakness  [x] Motor weakness: Secondary to pain [x] mild  [] moderate  [] severe     [x] Resist ABduction (Delt, SS)   [x] Resist EXternal Rotation (IS,TM)    [] Resist INternal Rotation (SubS)         Neurologic:  [x] Sensation to light touch intact   [x] Coordination-proprioception intact      Circulation:  [x] The limb is warm and well perfused  [x] Distal pulses intact (UE)   [x] Capillary refill is intact  [x] Edema:  [x] none  [] mild  [] moderate  [] severe    Contralateral Shoulder:  [x] No pain with ROM    Data Reviewed:     XRays:  (1 view: lateral) of his C-spine taken today 11/17/17 in the office and reviewed by me personally showed mild degenerative changes C5-C6.     (3 views: AP, Y views and axillary) of his left shoulder taken today 11/17/17 in the office and reviewed by me personally showed moderate degenerative changes of the acromioclavicular joint with spurring noted inferior to the acromion. [x]  A copy of the AP XRay was given to him and reviewed with him today.      Assessment (Medical Decision Making):     Higinio Vázquez is a 56y.o. year old male with the following diagnosis and treatments ordered today:    1. Chronic left shoulder pain  XR SHOULDER LEFT (MIN 2 VIEWS)    Ambulatory referral to Physical Therapy   2. Arthritis of left acromioclavicular joint  Ambulatory referral to Physical Therapy   3. Rotator cuff tendinitis, left  Ambulatory referral to Physical Therapy   4. Bursitis of left shoulder  Ambulatory referral to Physical Therapy   5. Biceps tendinitis of left upper extremity  Ambulatory referral to Physical Therapy   6. Pain radiating to neck  XR Cervical Spine Lateral    Ambulatory referral to Physical Therapy       His overall course: Worsening    Plan (Medical Decision Making):      I discussed the diagnosis and the treatment options with Meenu Brown today. In Summary:  1). The various treatment options were outlined and discussed with Meenu Brown including:      [x] Conservative care options:      physical therapy, ice, NSAID's and activity modification       [x] The indications for therapeutic injections      [x] The indications for additional imaging/laboratory studies      [x] The indications for (possible future) operative intervention    2). After considering the various options discussed, Meenu Brown elected to pursue a course of treatment that includes the following:      [x] MEDICATIONS/REFILLS prescribed today are listed below. No refills today. He may continue to take the Mobic. [x]  He can use Asper cream with lidocaine over-the-counter ointment for his shoulder and Lidocare pain patches which are also available over-the-counter. [x] Physical Therapy/HEP Formal physical therapy for his left shoulder.       [x] Script: 2 x per week x 4 weeks    [x] Ice to affected area: 15-20 minutes 4 x day    [x] Over the Counter/Suppliment Tx     Drinking 6-8 oz of Tart Cherry Juice     Taking Glucosamine and Chondroitin Sulfate (1500 mg/d)     Vit D 3 (at least 2,000 IU/day)    [x]  We did discuss the possibility of injections into his left shoulder. He did have an injection on 11/1/17, so unfortunately we cannot do an injection today. Return to Clinic/Follow - Up:  Fabrice Miller was asked to make a follow-up appointment:    [x] 6-8 weeks or after December 12th for an injection if needed. Fabrice Miller was instructed to call the office if his symptoms worsen or if new symptoms appear prior to the next scheduled visit. He is specifically instructed to contact the office between now & his scheduled appointment if he has concerns related to his condition or if he needs assistance in scheduling the above tests. He is welcome to call for an appointment sooner if he has any additional concerns or questions. Patient Education Materials Provided:  [x] Stephanie Jacobo PA-C: Dr. Mat Perez patient folder, Shoulder Booklet, Anatomic Drawings and treatment algorithms    Patient Instructions      I have asked Fabrice Miller to schedule a follow-up appointment in 6-8 weeks. He is specifically instructed to contact the office between now & his scheduled appointment if he has concerns related to his condition. He is welcome to call for an appointment sooner if he has any additional concerns or questions. Ice (\"ICE IS YOUR FRIEND\": try using a bag of frozen peas or corn) to injured/painful area for 15  20 minutes 3 x day. General Medication Instructions:  Any prescriptions must be used as directed. If you have any concerns, questions or require refills, please contact our office. If you experience any adverse reactions, stop the medication and call our office immediately. If you designate a preferred pharmacy, appropriate prescriptions will be sent to your preferred pharmacy for pickup to be use as directed. Patient Driving Instructions:  No driving if you are taking narcotic pain medications or muscle relaxers. PATIENT REMINDER:   Carry a list of your medications and allergies with you at all times.   Call your pharmacy and our being a target of crime. [x] Dispose of any unused medications properly. Do not flush the medications. Look for appropriate waste collection programs in your community and drug take back events. [x] DO NOT drive while taking narcotic pain medication or muscle relaxers. FOR MORE INFORMATION, CHECK OUT THIS RESOURCE    For your convenience, Dr. Antonio White has provided the following link that may be helpful for you if you would like more information on your Orthopaedic condition:    www. Orthoinfo. org         If you or someone you know struggles with weight issues and joint pain, please check out this important documentary:      \"Start Moving Start Living\" =  Http://[email or web address]       (YOUTUBE video SMSL FULL SD)                  Orders Placed This Encounter   Procedures    XR SHOULDER LEFT (MIN 2 VIEWS)     Standing Status:   Future     Number of Occurrences:   1     Standing Expiration Date:   11/17/2018     Order Specific Question:   Reason for exam:     Answer:   Pain    XR Cervical Spine Lateral     Standing Status:   Future     Number of Occurrences:   1     Standing Expiration Date:   11/17/2018     Order Specific Question:   Reason for exam:     Answer:   Pain    Ambulatory referral to Physical Therapy     Referral Priority:   Routine     Referral Type:   Eval and Treat     Requested Specialty:   Physical Therapy     Number of Visits Requested:   1       Refills/New Prescriptions:  No orders of the defined types were placed in this encounter.     Today's prescription medications will be e-scribed (when appropriate) to the Patient's Preferred Pharmacy:   [facility] [address] [place] [address], [address] [id number]  Phone: [phone] Fax: [phone]        Naomi Burris PA-C  Electronically signed by Naomi Burris PA-C on 11/17/2017 at 4:32 PM     Contact Information:  Milo Singh  &  Clinical Staff  [phone], Option 3    This dictation was performed with a verbal recognition program (DRAGON) and it was checked for errors. It is possible that there are still dictated errors within this office note. If so, please bring any errors to my attention for an addendum. All efforts were made to ensure that this office note is accurate. I have personally performed and/or participated in the history, physical exam and medical decision making and reviewed all pertinent clinical information unless otherwise noted.

## 2017-11-17 NOTE — LETTER
FAXED/SENT TO Dr Bobbi Conn CNP  11/17/17, 4:32 PM        Alexandra Perez PA-C  Orthopaedic Surgery & Sports Medicine      Dear Dr Bobbi Conn CNP,    Thank you very much for your referral of Mr. Indira Partida to me for evaluation and treatment. Attached below is my report and recommendations from Ronen Sparks most recent office visit. I appreciate your confidence in me and thank you for allowing me the opportunity to care for your patients. If I can be of any further assistance to you on this or any other patient, please do not hesitate to contact me. Sincerely,    Alexandra Perez PA-C  Electronically signed by Alexandra Perez PA-C on 11/17/2017 at 4:32 PM     Contact Information:  Dawna Garland,  &  Clinical Staff  [phone], [address] [facility]  Orthopaedic Surgery & Sports Medicine       [facility] OFFICE  [address] [place] [address], [address] [place] [address]    [phone] Option 3   [phone] Option [phone] (fax)    [phone] (fax)       PATIENT: Indira Partida    56 y.o.  male  [facility]: 1960   MRN:  [id number]       Date of current encounter: 11/17/2017  This encounter is evaluated as a:        New Patient Visit     Established Patient Visit    Post-Op Visit      Consult: requested by Bobbi Conn CNP for evaluation and treatment of his left shoulder. Worker's Comp       Patient's PCP is Dr. Bobbi Conn CNP    Subjective:     Chief Complaint   Patient presents with    Shoulder Pain     evaluation of left shoulder        HPI:  Indira Partida is a 56y.o. year old, right hand dominant  male complaining of left shoulder pain. This is not due to an injury. This is not due to a work injury.  When he was a teenager he states that he had a mass removed from the anterior aspect of his left shoulder. After it was removed he did not have any other issues with shoulder. He states that he has had left shoulder pain for over a year. He has seen his primary care physician who has given him injections in the shoulder, which have helped. The last injection he received on 11/1 one did not help as much as the others. He has never done physical therapy for his shoulder. He denies numbness and tingling down his upper extremity. He rates his pain as an 8 out of 10 at rest and with activity. He describes his pain as throbbing and cracking. It is persistent and constant. Bending, stretching and stretching aggravate his pain. He does feel his shoulder is limiting his behavior some. He does have night pain. He does have morning stiffness. PAIN ASSESSMENT:   Pain Assessment  Location of Pain: Shoulder  Location Modifiers: Left  Severity of Pain: 8  Quality of Pain: Throbbing, Cracking  Duration of Pain: Persistent  Frequency of Pain: Constant  Date Pain First Started:  (ongoing over a year)  Aggravating Factors: Bending, Stretching, Straightening  Limiting Behavior: Some  Relieving Factors:  Other (Comment) (nothing)  Result of Injury: No  Work-Related Injury: No  Are there other pain locations you wish to document?: No    Patient Active Problem List   Diagnosis    [facility]ellar [facility] ([facility] [address]    Essential hypertension    Gastroesophageal reflux disease without esophagitis    Low testosterone level in male    Cigarette nicotine dependence without complication    Emphysema lung (HCC)    Chronic left shoulder pain     Past Medical History:   Diagnosis Date    Aneurysm ([facility] [address]     Arthritis     Cerebral artery occlusion with cerebral infarction [facility])     [address] 2016    Hypertension      Past Surgical History:   Procedure Laterality Date    SHOULDER ARTHROSCOPY Left     at [facility]       Allergies:  Pcn [penicillins]    Medications:  Prior to Visit Medications Medication Sig Taking? Authorizing Provider   guaiFENesin (MUCINEX) 600 MG extended release tablet Take 1 tablet by mouth 2 times daily  Ambar Vázquez CNP   sodium chloride (ALTAMIST SPRAY) 0.65 % nasal spray 1 spray by Nasal route as needed for Congestion  Ambar Vázquez CNP   aspirin 81 MG tablet Take 1 tablet by mouth daily  Ambar Vázquez CNP   meloxicam (MOBIC) 7.5 MG tablet Take 1 tablet by mouth daily Take 1 tablet daily  Ambar Vázquez CNP   Cholecalciferol (VITAMIN D) 2000 units TABS tablet Take 1 tablet by mouth daily  Ambar Vázquez CNP   labetalol (NORMODYNE) 300 MG tablet Take 1 tablet by mouth daily  mAbar Vázquez CNP   amLODIPine (NORVASC) 10 MG tablet Take 1 tablet by mouth daily  Ambar Vázquez CNP   pantoprazole (PROTONIX) 40 MG tablet Take 1 tablet by mouth every morning (before breakfast)  Ambar Vázquez CNP   lisinopril (PRINIVIL) 20 MG tablet Take 1 tablet by mouth daily  Ambar Vázquez CNP   Blood Pressure KIT Check blood pressure once daily in the mornings. Ambar Vázquez CNP   tadalafil (CIALIS) 5 MG tablet Take 1 tablet by mouth as needed for Erectile Dysfunction Take one tablet daily as needed for erectile dysfunction. Ambar Vázquez CNP   nicotine (NICODERM CQ) 14 MG/24HR Place 1 patch onto the skin daily  Ambar Vázquez CNP   scopolamine (TRANSDERM-SCOP) transdermal patch Place 1 patch onto the skin every 72 hours  Marv Crespo MD   ondansetron (ZOFRAN) 4 MG tablet Take 1 tablet by mouth every 8 hours as needed for Nausea  Marv Crespo MD   nystatin (MYCOSTATIN) 830332 UNIT/ML suspension Take 5 mLs by mouth 4 times daily  Robert Blankenship MD     Social History     Social History    Marital status: Single     Spouse name: N/A    Number of children: N/A    Years of education: N/A     Occupational History    Not on file.      Social History Main Topics    Smoking status: Current Every Day Smoker     Packs/day: 1.00     Years: 35.00 Types: Cigarettes    Smokeless tobacco: Never Used    Alcohol use No    Drug use: No    Sexual activity: Yes     Partners: Female     Other Topics Concern    Not on file     Social History Narrative    No narrative on file     Family History   Problem Relation Age of Onset    High Blood Pressure Mother        Review of Systems (ROS):    Performed. Adenike Overton's review of systems has been performed by intake and observation. An updated ROS was scanned into the media tab of the chart today 11/17/2017 . He has been instructed to contact his primary care physician regarding ROS issues if not already being addressed at this time. There are no recent changes. Objective:   Physical Exam  Vital Signs:  BP (!) 175/108   Pulse 59   Ht 5' 6\" (1.676 m)   Wt 140 lb (63.5 kg)   BMI 22.60 kg/m²      Constitution:  Generally, Lorna Enrique is  alert,  appears stated age, and  in no distress.   His general body habitus is  Cachectic   Thin   Normal   Obese   Morbidly Obese    Head:  Normocephalic  Eyes:  Extra-occular muscles intact    Left Ear:  External Ear normal   Right Ear:  External Ear normal   Nose:  Normal  Mouth:  Oral mucosa moist   No perioral lesions    Missing multiple teeth  Pulmonary:  Respirations unlabored and regular  Skin:  Warm  Well perfused     Psychiatric:    Good judgement and insight   Oriented to  person,  place, and  time   Mood appropriate for circumstances    Gait:   Gait is  Normal   Impaired  Assistive Device:  None   Knee Brace   Cane   Crutches    Walker    Wheelchair   Other    [address] [place] [address]   Inspection:   Skin intact without abrasion, lacerations or rashes   Normal neck alignment   Scar / Surgical incision:  none   Anterior  Posterior   Ecchymosis:  none   mild   moderate   severe   Atrophy:   none  mild   moderate   severe      Neck Exam:   Full ROM    Limited ROM   Flex-Ex    Lateral Rotation     Lateral Bending    Palpation:    No Tenderness Data Reviewed:     XRays:  (1 view: lateral) of his C-spine taken today 11/17/17 in the office and reviewed by me personally showed mild degenerative changes C5-C6.     (3 views: AP, Y views and axillary) of his left shoulder taken today 11/17/17 in the office and reviewed by me personally showed moderate degenerative changes of the acromioclavicular joint with spurring noted inferior to the acromion. A copy of the AP XRay was given to him and reviewed with him today. Assessment (Medical Decision Making):     Onelia Vasquez is a 56y.o. year old male with the following diagnosis and treatments ordered today:    1. Chronic left shoulder pain  XR SHOULDER LEFT (MIN 2 VIEWS)    Ambulatory referral to Physical Therapy   2. Arthritis of left acromioclavicular joint  Ambulatory referral to Physical Therapy   3. Rotator cuff tendinitis, left  Ambulatory referral to Physical Therapy   4. Bursitis of left shoulder  Ambulatory referral to Physical Therapy   5. Biceps tendinitis of left upper extremity  Ambulatory referral to Physical Therapy   6. Pain radiating to neck  XR Cervical Spine Lateral    Ambulatory referral to Physical Therapy       His overall course: Worsening    Plan (Medical Decision Making):      I discussed the diagnosis and the treatment options with Onelia Vasquez today. In Summary:  1). The various treatment options were outlined and discussed with Onelia Vasquez including:       Conservative care options:      physical therapy, ice, NSAID's and activity modification        The indications for therapeutic injections       The indications for additional imaging/laboratory studies       The indications for (possible future) operative intervention    2). After considering the various options discussed, Onelia Vasquez elected to pursue a course of treatment that includes the following:       MEDICATIONS/REFILLS prescribed today are listed below. No refills today. He may continue to take the Mobic. He can use Asper cream with lidocaine over-the-counter ointment for his shoulder and Lidocare pain patches which are also available over-the-counter. Physical Therapy/HEP Formal physical therapy for his left shoulder. Script: 2 x per week x 4 weeks     Ice to affected area: 15-20 minutes 4 x day     Over the Counter/Suppliment Tx     Drinking 6-8 oz of Tart Cherry Juice     Taking Glucosamine and Chondroitin Sulfate (1500 mg/d)     Vit D 3 (at least 2,000 IU/day)      We did discuss the possibility of injections into his left shoulder. He did have an injection on 11/1/17, so unfortunately we cannot do an injection today. Return to Clinic/Follow - Up:  Mckenna Montana was asked to make a follow-up appointment:     6-8 weeks or after December 12th for an injection if needed. Mckenna Montana was instructed to call the office if his symptoms worsen or if new symptoms appear prior to the next scheduled visit. He is specifically instructed to contact the office between now & his scheduled appointment if he has concerns related to his condition or if he needs assistance in scheduling the above tests. He is welcome to call for an appointment sooner if he has any additional concerns or questions. Patient Education Materials Provided:   Surya Lassiter PA-C: Dr. Esteves Gist patient folder, Shoulder Booklet, Anatomic Drawings and treatment algorithms    Patient Instructions      I have asked Mckenna Montana to schedule a follow-up appointment in 6-8 weeks. He is specifically instructed to contact the office between now & his scheduled appointment if he has concerns related to his condition. He is welcome to call for an appointment sooner if he has any additional concerns or questions. Ice (\"ICE IS YOUR FRIEND\": try using a bag of frozen peas or corn) to injured/painful area for 15  20 minutes 3 x day.     General Medication Instructions: Any prescriptions must be used as directed. If you have any concerns, questions or require refills, please contact our office. If you experience any adverse reactions, stop the medication and call our office immediately. If you designate a preferred pharmacy, appropriate prescriptions will be sent to your preferred pharmacy for pickup to be use as directed. Patient Driving Instructions:  No driving if you are taking narcotic pain medications or muscle relaxers. PATIENT REMINDER:   Carry a list of your medications and allergies with you at all times. Call your pharmacy and our office at least 1 week in advance to refill prescriptions. Narcotic medications will not be refilled after hours or on weekends. ATTENTION    As of October 2, 2014, the [facility] ([facility]) has mandated that ALL PRESCRIPTION PAIN MEDICINE cannot be called into your pharmacy. This includes:    Oxycodone (Percocet)  Hydrocodone (Vicodin, Norco)  Tramadol (Ultram)  Other    These medications must have prescriptions which are written and signed by your doctor (Dr. Jeremy Angel). This means that you must call ahead and come in to the office to  the paper prescription and take it to your pharmacy. We are sorry for any inconvenience but this is now the law. Artemio aLdd MD  Board Certified Orthopaedic Surgeon  Knee and Shoulder Surgery Specialist    Contact Information:  Nathan Rivera,   [phone], Option [address]         IMPORTANT NARCOTIC INFORMATION: PLEASE READ      DO NOT share your prescription medication with anyone! Sharing is illegal.  The prescription dose is based on your age, weight, and health problems. Sharing your narcotic prescription can lead to accidental death of the individual for which the prescription was not prescribed. You may not know about his/her addiction problem. Always use the same pharmacy when filling your narcotic prescriptions. DO NOT mix your narcotic prescription with alcohol. Mixing the narcotic prescription medication with alcohol causes depressive effects including breathing problems, organ malfunction, and cardiac arrest.      Always keep your narcotic medication in a locked, secured location. Keep your medication private. This is to avoid individuals from taking your medication without your knowledge. This medication is highly sought after and locking your prescriptions will protect you from being a target of crime. DO NOT stock pile your medication. This also will protect you from being a target of crime. Dispose of any unused medications properly. Do not flush the medications. Look for appropriate waste collection programs in your community and drug take back events. DO NOT drive while taking narcotic pain medication or muscle relaxers. FOR MORE INFORMATION, CHECK OUT THIS RESOURCE    For your convenience, Dr. Christina Chakraborty has provided the following link that may be helpful for you if you would like more information on your Orthopaedic condition:    www. Orthoinfo. org         If you or someone you know struggles with weight issues and joint pain, please check out this important documentary:      \"Start Moving Start Living\" =  Http://[email or web address]       (YOUTUBE video SMSL FULL SD)                  Orders Placed This Encounter   Procedures    XR SHOULDER LEFT (MIN 2 VIEWS)     Standing Status:   Future     Number of Occurrences:   1     Standing Expiration Date:   11/17/2018     Order Specific Question:   Reason for exam:     Answer:   Pain    XR Cervical Spine Lateral     Standing Status:   Future     Number of Occurrences:   1     Standing Expiration Date:   11/17/2018     Order Specific Question:   Reason for exam:     Answer:   Pain    Ambulatory referral to Physical Therapy     Referral Priority:   Routine     Referral Type:   Eval and Treat

## 2017-11-17 NOTE — PATIENT INSTRUCTIONS
I have asked Meenu Brown to schedule a follow-up appointment in 6-8 weeks. He is specifically instructed to contact the office between now & his scheduled appointment if he has concerns related to his condition. He is welcome to call for an appointment sooner if he has any additional concerns or questions. Ice (\"ICE IS YOUR FRIEND\": try using a bag of frozen peas or corn) to injured/painful area for 15  20 minutes 3 x day. General Medication Instructions:  Any prescriptions must be used as directed. If you have any concerns, questions or require refills, please contact our office. If you experience any adverse reactions, stop the medication and call our office immediately. If you designate a preferred pharmacy, appropriate prescriptions will be sent to your preferred pharmacy for pickup to be use as directed. Patient Driving Instructions:  No driving if you are taking narcotic pain medications or muscle relaxers. PATIENT REMINDER:   Carry a list of your medications and allergies with you at all times. Call your pharmacy and our office at least 1 week in advance to refill prescriptions. Narcotic medications will not be refilled after hours or on weekends. ATTENTION    As of October 2, 2014, [facility]) has mandated that ALL PRESCRIPTION PAIN MEDICINE cannot be called into your pharmacy. This includes:    Oxycodone (Percocet)  Hydrocodone (Vicodin, Norco)  Tramadol (Ultram)  Other    These medications must have prescriptions which are written and signed by your doctor (Dr. Irena Woodruff). This means that you must call ahead and come in to the office to  the paper prescription and take it to your pharmacy. We are sorry for any inconvenience but this is now the law.     Buster Su MD  Board Certified Orthopaedic Surgeon  Knee and Shoulder Surgery Specialist    Contact Information:  Estephania Dorsey,   [phone], Option

## 2017-12-07 PROBLEM — B35.1 ONYCHOMYCOSIS: Status: ACTIVE | Noted: 2017-12-07

## 2017-12-07 PROBLEM — M67.472 GANGLION OF FOOT, LEFT: Status: ACTIVE | Noted: 2017-12-07

## 2017-12-12 ENCOUNTER — OFFICE VISIT (OUTPATIENT)
Dept: ORTHOPEDIC SURGERY | Age: 57
End: 2017-12-12

## 2017-12-12 VITALS
DIASTOLIC BLOOD PRESSURE: 80 MMHG | SYSTOLIC BLOOD PRESSURE: 137 MMHG | WEIGHT: 139.99 LBS | BODY MASS INDEX: 22.5 KG/M2 | HEART RATE: 59 BPM | HEIGHT: 66 IN

## 2017-12-12 DIAGNOSIS — M19.012 ARTHRITIS OF LEFT ACROMIOCLAVICULAR JOINT: ICD-10-CM

## 2017-12-12 DIAGNOSIS — M75.52 BURSITIS OF LEFT SHOULDER: ICD-10-CM

## 2017-12-12 DIAGNOSIS — G89.29 CHRONIC LEFT SHOULDER PAIN: Primary | ICD-10-CM

## 2017-12-12 DIAGNOSIS — M25.512 CHRONIC LEFT SHOULDER PAIN: Primary | ICD-10-CM

## 2017-12-12 DIAGNOSIS — M75.22 BICEPS TENDINITIS OF LEFT UPPER EXTREMITY: ICD-10-CM

## 2017-12-12 DIAGNOSIS — M75.82 ROTATOR CUFF TENDINITIS, LEFT: ICD-10-CM

## 2017-12-12 PROCEDURE — 99999 PR OFFICE/OUTPT VISIT,PROCEDURE ONLY: CPT | Performed by: PHYSICIAN ASSISTANT

## 2017-12-12 PROCEDURE — 20610 DRAIN/INJ JOINT/BURSA W/O US: CPT | Performed by: PHYSICIAN ASSISTANT

## 2017-12-12 NOTE — LETTER
FAXED/SENT TO Dr Linda Alarcon CNP  12/12/17, 4:45 PM        Surya Lassiter PA-C  Orthopaedic Surgery & Sports Medicine      Dear Dr Linda Alarcon CNP,    Thank you very much for your referral of Mr. Mckenna Montana to me for evaluation and treatment. Attached below is my report and recommendations from Hebert Moncada most recent office visit. I appreciate your confidence in me and thank you for allowing me the opportunity to care for your patients. If I can be of any further assistance to you on this or any other patient, please do not hesitate to contact me. Sincerely,    Surya Lassiter PA-C  Electronically signed by Surya Lassiter PA-C on 12/12/2017 at 4:45 PM     Contact Information:  Netta Alvarado,  &  Clinical Staff  [phone], [address] [facility]  Orthopaedic Surgery & Sports Medicine       [address] [facility] OFFICE  [address], [address] Floor  [address] [facility], [address] [place] [address]    [phone] Option [address]   [phone] Option [phone] (fax)    [phone] (fax)       PATIENT: Mckenna Montana    56 y.o.  male  [facility]: 1960   MRN:  [id number]       Date of current encounter: 12/12/2017  This encounter is evaluated as a:        New Patient Visit     Established Patient Visit    Post-Op Visit      Consult: requested by          Worker's Comp       Patient's PCP is Dr. Linda Alarcon CNP    Subjective:     Reason for Visit: left shoulder injection    Chief Complaint   Patient presents with    Shoulder Pain     ongoing evaluation and treatment of left shoulder        HPI:  Mckenna Montana is a 56y.o. year old, right hand dominant  male complaining of left shoulder pain. Since his last visit he has not yet been able to go to physical therapy. He is trying to schedule an appointment around his work schedule.  He would like an injection into his left shoulder today. He has been using BenGay which has helped some. He rates his pain as an 8 out of 10 at rest and with activity. He describes his pain as sharp, aching and cracking. It is persistent and constant. Bending, stretching, straightening, reaching overhead and lifting aggravate his pain. He does feel that his shoulder limits his behavior some. He does have night pain. He does have morning stiffness. PAIN ASSESSMENT:   Pain Assessment  Location of Pain: Shoulder  Location Modifiers: Left  Severity of Pain: 8  Quality of Pain: Sharp, Aching, Cracking  Duration of Pain: Persistent  Frequency of Pain: Constant  Date Pain First Started:  (Ongoing)  Aggravating Factors: Bending, Stretching, Straightening  Limiting Behavior: Some  Relieving Factors:  (bengay ointment)  Result of Injury: No  Work-Related Injury: No  Are there other pain locations you wish to document?: No    Patient Active Problem List   Diagnosis    Cerebellar hemorrhage (HCC)    Essential hypertension    Gastroesophageal reflux disease without esophagitis    Low testosterone level in male    Cigarette nicotine dependence without complication    Emphysema lung (HCC)    Chronic left shoulder pain    Ganglion of foot, left    Onychomycosis    Arthritis of left acromioclavicular joint    Rotator cuff tendinitis, left    Bursitis of left shoulder    Biceps tendinitis of left upper extremity     Past Medical History:   Diagnosis Date    Aneurysm ([facility] [address]     Arthritis     Cerebral artery occlusion with cerebral infarction [facility])     [address]    Hypertension      Past Surgical History:   Procedure Laterality Date    SHOULDER ARTHROSCOPY Left     at [facility]       Allergies:  Pcn [penicillins]    Medications:  Prior to Visit Medications    Medication Sig Taking?  Authorizing Provider   guaiFENesin (MUCINEX) 600 MG extended release tablet Take 1 tablet by mouth 2 times daily Yes Ismael Rosario CNP sodium chloride (ALTAMIST SPRAY) 0.65 % nasal spray 1 spray by Nasal route as needed for Congestion Yes [place] AlexandreFRANCISCO   aspirin 81 MG tablet Take 1 tablet by mouth daily Yes [place] Bettie FRANCISCO   meloxicam (MOBIC) 7.5 MG tablet Take 1 tablet by mouth daily Take 1 tablet daily Yes [place] BettieFRANCISCO   Cholecalciferol (VITAMIN D) 2000 units TABS tablet Take 1 tablet by mouth daily Yes Amy Alexandre FRANCISCO   labetalol (NORMODYNE) 300 MG tablet Take 1 tablet by mouth daily Yes [place] Alexandre FRANCISCO   amLODIPine (NORVASC) 10 MG tablet Take 1 tablet by mouth daily Yes [place] Alexandre FRANCISCO   pantoprazole (PROTONIX) 40 MG tablet Take 1 tablet by mouth every morning (before breakfast) Yes [place] Bettie FRANCISCO   lisinopril (PRINIVIL) 20 MG tablet Take 1 tablet by mouth daily Yes Amy Alexandre CNP   Blood Pressure KIT Check blood pressure once daily in the mornings. Yes Amy Bettie FRANCISCO   tadalafil (CIALIS) 5 MG tablet Take 1 tablet by mouth as needed for Erectile Dysfunction Take one tablet daily as needed for erectile dysfunction. Yes [place] Alexandre CNP   nicotine (NICODERM CQ) 14 MG/24HR Place 1 patch onto the skin daily Yes Amy FRANCISCO Alexandre   scopolamine (TRANSDERM-SCOP) transdermal patch Place 1 patch onto the skin every 72 hours Yes Sanders Ormond Heis, MD   ondansetron (ZOFRAN) 4 MG tablet Take 1 tablet by mouth every 8 hours as needed for Nausea Yes Sebastian Peres MD   nystatin (MYCOSTATIN) 538212 UNIT/ML suspension Take 5 mLs by mouth 4 times daily Yes Sebastian Peres MD     Social History     Social History    Marital status: Single     Spouse name: N/A    Number of children: N/A    Years of education: N/A     Occupational History    Not on file.      Social History Main Topics    Smoking status: Current Every Day Smoker     Packs/day: 1.00     Years: 35.00     Types: Cigarettes    Smokeless tobacco: Never Used    Alcohol use No    Drug use: No    Sexual activity: Yes     Partners: Female Other Topics Concern    Not on file     Social History Narrative    No narrative on file     Family History   Problem Relation Age of Onset    High Blood Pressure Mother        Review of Systems (ROS):    Performed. Samir Overton's review of systems has been performed by intake and observation. The most recent ROS was scanned into the media tab of the chart on 11/17/2017. He has been instructed to contact his primary care physician regarding ROS issues if not already being addressed at this time. There are no recent changes. Objective:   Physical Exam  Vital Signs:  /80   Pulse 59   Ht 5' 6\" (1.676 m)   Wt 139 lb 15.9 oz (63.5 kg)   BMI 22.60 kg/m²      Constitution:  Generally, Jordi Quick is  alert,  appears stated age, and  in no distress. His general body habitus is  Cachectic   Thin   Normal   Obese   Morbidly Obese    Head:  Normocephalic  Eyes:  Extra-occular muscles intact    Left Ear:  External Ear normal   Right Ear:  External Ear normal   Nose:  Normal  Mouth:  Oral mucosa moist   No perioral lesions   Missing multiple teeth  Pulmonary:  Respirations unlabored and regular  Skin:  Warm  Well perfused     Psychiatric:    Good judgement and insight   Oriented to  person,  place, and  time. Mood appropriate for circumstances.     Gait:   Gait is  Normal   Impaired  Assistive Device:  None   Knee Brace   Cane   Crutches    Walker    Wheelchair   Other    [facility]   Inspection:   Skin intact without abrasion, lacerations or rashes   Normal neck alignment   Scar / Surgical incision:  none   Anterior  Posterior   Ecchymosis:  none   mild   moderate   severe   Atrophy:   none  mild   moderate   severe      ORTHOPAEDIC SHOULDER EXAM: LEFT    Inspection:   Skin intact without abrasion, lacerations or rashes   Ecchymosis:   none   mild   moderate   severe   Atrophy:   none   mild   moderate   severe   Deformity:  Biceps   Scapular Winging Scar / Surgical incision(s):  Well-healed 2 inch vertical incision on the anterior shoulder      Range of Motion:   Normal ROM     Deferred: acute injury/post-surgery/pain    Limited ROM:   ABduction: 0-100° with pain   Forward Flexion: 0-120° with pain   Internal Rotation: With discomfort     Palpation:    No Tenderness   Tenderness: AC joint, proximal biceps, anterior/lateral deltoid   mild   moderate   severe   A-C Joint   Proximal Biceps    Provocative Tests for Rotator Cuff:    Negative:   Positive Tests:   Neer's (Impingement) Test    Hawkin's (Impingement) Test     Provocative Tests for Biceps Tendon/SLAP:    Negative  Positive Test   Speed's Test      Provocative Tests for [facility] Joint Pathology:   Negative  Positive Test   Cross Body ADD Test     Motor Function:   No gross motor weakness   Motor weakness:   mild   moderate   severe         Neurologic:   Sensation to light touch intact    Coordination-proprioception intact      Circulation:   The limb is warm and well perfused   Capillary refill is intact   Edema:   none   mild   moderate   severe    Contralateral Shoulder:   No pain with ROM     Data Reviewed:     No imaging studies were obtained today. XRays:  (1 view: lateral) of his C-spine taken 11/17/17 showed mild degenerative changes C5-C6.                (3 views: AP, Y views and axillary) of his left shoulder taken 11/17/17 showed moderate degenerative changes of the acromioclavicular joint with spurring noted inferior to the acromion. PROCEDURE NOTE: LEFT SHOULDER INJECTION  12/12/2017 at 4:38 PM   Procedure: Injection  Verbal consent was obtained. Risks and benefits were explained. Questions were encouraged and answered.       Timeout Verification Completed including:    Correct patient: Gallo Screws was identified    Correct procedure    Correct site & side    Correct equipment and supplies    Staff member: Florentino Dawn PA-C    Assistant: Mallory Laura Injection Site(s): Posterolateral    The injection site was prepped with Chlora-Prep using aseptic technique and a left shoulder injection was performed with ethyl chloride for anesthetic. Lidocaine 1% 4 ml with Depomedrol 2 ml (40 mg/ml = 80 mg total) was injected. A sterile adhesive dressing was applied. Post procedure:  Mckenna Montana tolerated the treatment well. Instructions to patient:  Appropriate post injections instructions were given to Mckenna Montana. Assessment (Medical Decision Making):     Mckenna Montana is a 56y.o. year old male with the following diagnosis:    1. Chronic left shoulder pain     2. Arthritis of left acromioclavicular joint     3. Rotator cuff tendinitis, left     4. Bursitis of left shoulder     5. Biceps tendinitis of left upper extremity         His overall course:         Responding adequately to ongoing treatment      Worsening despite conservative treatment      Unchanged despite conservative treatment    Plan (Medical Decision Making):      I discussed the diagnosis and the treatment options with Mckenna Montana today. In Summary:  1). The various treatment options were outlined and discussed with Mckenna Montana including:       Conservative care options:      physical therapy, ice, NSAID's and activity modification        The indications for therapeutic injections    2). After considering the various options discussed, Mckenna Montana elected to pursue a course of treatment that includes the following:       MEDICATIONS/REFILLS prescribed today are listed below. No refills today. He may continue to take his Mobic. Physical Therapy/HEP Activities as tolerated, formal physical therapy as recommended on his prior visit.        Script: 2 x per week x 4 weeks     Ice to affected area: 15-20 minutes 4 x day     Injection today into his left shoulder     Return to Clinic/Follow - Up:  Mckenna Montana was asked to make a follow-up appointment: 6-8 weeks if needed    Jayden Landa was instructed to call the office if his symptoms worsen or if new symptoms appear prior to the next scheduled visit. He is specifically instructed to contact the office between now & his scheduled appointment if he has concerns related to his condition or if he needs assistance in scheduling the above tests. He is welcome to call for an appointment sooner if he has any additional concerns or questions. Patient Education Materials Provided:    Patient Instructions     Jayden Landa was instructed to apply ice to the injection area for 15 - 20 minutes several times a day to decrease pain and and swelling. Ice (\"ICE IS YOUR FRIEND\": try using a bag of frozen peas or corn) for 15  20 minutes 3 x day. Limit activities today. You may resume normal activities tomorrow if you have no pain. For severe pain:  If after hours, he is to go to Emergency Room. During office hours he must come in to the office. Jayden Landa was instructed to call the office if there are any questions or concerns related to his condition. I have asked him to schedule a follow-up appointment for 6-8 weeks from now for re-evaluation and possible repeat injection. He is specifically instructed to contact the office between now & his scheduled appointment if he has concerns related to his condition. He is welcome to call for an appointment sooner if he has any additional concerns or questions. General Medication Instructions:  Any prescriptions must be used as directed. If you have any concerns, questions or require refills, please contact our office. If you experience any adverse reactions, stop the medication and call our office immediately. If you designate a preferred pharmacy, appropriate prescriptions will be sent to your preferred pharmacy for pickup to be use as directed.     Patient Driving Instructions: No driving if you are taking narcotic pain medications or muscle relaxers. PATIENT REMINDER:   Carry a list of your medications and allergies with you at all times. Call your pharmacy and our office at least 1 week in advance to refill prescriptions. Narcotic medications will not be refilled after hours or on weekends. ATTENTION    As of October 2, 2014, the [place] [address] ([facility]) has mandated that ALL PRESCRIPTION PAIN MEDICINE cannot be called into your pharmacy. This includes:    Oxycodone (Percocet)  Hydrocodone (Vicodin, Norco)  Tramadol (Ultram)  Other    These medications must have prescriptions which are written and signed by your provider. This means that you must call ahead and come in to the office to  the paper prescription and take it to your pharmacy. We are sorry for any inconvenience but this is now the law. Ingrid Garcia PA-C  Physician Assistant, Orthopaedic Surgery    Contact Information:  Meena Vaca,  & Clinical Staff  [phone], [address]         IMPORTANT NARCOTIC INFORMATION: PLEASE READ      DO NOT share your prescription medication with anyone! Sharing is illegal.  The prescription dose is based on your age, weight, and health problems. Sharing your narcotic prescription can lead to accidental death of the individual for which the prescription was not prescribed. You may not know about his/her addiction problem. Always use the same pharmacy when filling your narcotic prescriptions. DO NOT mix your narcotic prescription with alcohol. Mixing the narcotic prescription medication with alcohol causes depressive effects including breathing problems, organ malfunction, and cardiac arrest.      Always keep your narcotic medication in a locked, secured location. Keep your medication private. This is to avoid individuals from taking your medication without your knowledge.   This medication is highly sought after and locking your prescriptions will protect you from being a target of crime. DO NOT stock pile your medication. This also will protect you from being a target of crime. Dispose of any unused medications properly. Do not flush the medications. Look for appropriate waste collection programs in your community and drug take back events. DO NOT drive while taking narcotic pain medication or muscle relaxers. FOR MORE INFORMATION, CHECK OUT THIS RESOURCE    For your convenience, Dr. Doug Hatfield has provided the following link that may be helpful for you if you would like more information on your Orthopaedic condition:    www. Orthoinfo. org         If you or someone you know struggles with weight issues and joint pain, please check out this important documentary:      \"Start Moving Start Living\" =  Http://[employer]       ([employer] video SMSL FULL SD)                VITAMIN D3    Dr Doug Hatfield recommends that you add an over-the-counter supplement of vitamin D3, (at least 2,000 IU daily). Vitamin D3 is widely available without a prescription at pharmacies and buying clubs ([facility], [facility]) and on-line at sites such as Amazon.com. It comes in a variety of formulations (tablets, gelcaps, liquid) and doses (1,000 IU, 2,000 IU, 4,000 IU, 5,000 IU and even higher). The right dose for most people is 2,000 IU per day but higher doses are sometimes needed. We have not seen any problems with any of the formulations so we have no reason to recommend a specific brand. You can take your vitamin D3 at any time of the day, with or without food, with or without calcium. Because vitamin D3 is long acting, if you miss your vitamin D3 on one day you can double up the dose on a later day. No orders of the defined types were placed in this encounter. Refills/New Prescriptions:  No orders of the defined types were placed in this encounter. Today's prescription medications will be e-scribed (when appropriate) to the Patient's Preferred Pharmacy:   [facility] [address] [facility], [address] [place] [address]  Phone: [phone] Fax: [phone]       Jennifer John PA-C  Electronically signed by Jennifer John PA-C on 12/12/2017 at 4:38 PM     Contact Information:  Alisha Chandra, [address] [place] [address] Staff  [phone], [address]    This dictation was performed with a verbal recognition program (DRAGON) and it was checked for errors. It is possible that there are still dictated errors within this office note. If so, please bring any errors to my attention for an addendum. All efforts were made to ensure that this office note is accurate. I have personally performed and/or participated in the history, physical exam and medical decision making and reviewed all pertinent clinical information unless otherwise noted.

## 2017-12-12 NOTE — PATIENT INSTRUCTIONS
that may be helpful for you if you would like more information on your Orthopaedic condition:    www. Orthoinfo. org         If you or someone you know struggles with weight issues and joint pain, please check out this important documentary:      \"Start Moving Start Living\" =  Http://startmovingstIono Pharmaliving.[employer]       (YOUTUBE video SMSL FULL SD)                VITAMIN D3    Dr Tan Kay recommends that you add an over-the-counter supplement of vitamin D3, (at least 2,000 IU daily). Vitamin D3 is widely available without a prescription at pharmacies and buying clubs ([facility]) and on-line at sites such as Amazon.com. It comes in a variety of formulations (tablets, gelcaps, liquid) and doses (1,000 IU, 2,000 IU, 4,000 IU, 5,000 IU and even higher). The right dose for most people is 2,000 IU per day but higher doses are sometimes needed. We have not seen any problems with any of the formulations so we have no reason to recommend a specific brand. You can take your vitamin D3 at any time of the day, with or without food, with or without calcium. Because vitamin D3 is long acting, if you miss your vitamin D3 on one day you can double up the dose on a later day.

## 2017-12-12 NOTE — PROGRESS NOTES
Ron Blanco PA-C  Orthopaedic Surgery & Sports Medicine      [x] [address] OFF[facility]   [] [facility] OFFICE  [address]  [address] [address], [address] [address] [address]    [phone] Option 3   [phone] Option [phone] (fax)    [phone] (fax)       PATIENT: Lavern Morillo    56 y.o.  male  [facility]: 1960   MRN:  [id number]       Date of current encounter: 12/12/2017  This encounter is evaluated as a:       [] New Patient Visit    [x] Established Patient Visit   [] Post-Op Visit     [] Consult: requested by         [] Worker's Comp       Patient's PCP is Dr. Kajal Dillard, CNP    Subjective:     Reason for Visit: left shoulder injection    Chief Complaint   Patient presents with    Shoulder Pain     ongoing evaluation and treatment of left shoulder        HPI:  Lavern Morillo is a 56y.o. year old, right hand dominant  male complaining of left shoulder pain. Since his last visit he has not yet been able to go to physical therapy. He is trying to schedule an appointment around his work schedule. He would like an injection into his left shoulder today. He has been using BenGay which has helped some. He rates his pain as an 8 out of 10 at rest and with activity. He describes his pain as sharp, aching and cracking. It is persistent and constant. Bending, stretching, straightening, reaching overhead and lifting aggravate his pain. He does feel that his shoulder limits his behavior some. He does have night pain. He does have morning stiffness.     PAIN ASSESSMENT:   Pain Assessment  Location of Pain: Shoulder  Location Modifiers: Left  Severity of Pain: 8  Quality of Pain: Sharp, Aching, Cracking  Duration of Pain: Persistent  Frequency of Pain: Constant  Date Pain First Started:  (Ongoing)  Aggravating Factors: Bending, Stretching, Straightening  Limiting Behavior: Some  Relieving Factors:  (bengay ointment)  Result of Injury: No  Work-Related Injury: No  Are there other pain locations you wish to document?: No    Patient Active Problem List   Diagnosis    [facility]bellar hemorrhage ([facility]    Essential hypertension    Gastroesophageal reflux disease without esophagitis    Low testosterone level in male    Cigarette nicotine dependence without complication    Emphysema lung (HCC)    Chronic left shoulder pain    Ganglion of foot, left    Onychomycosis    Arthritis of left acromioclavicular joint    Rotator cuff tendinitis, left    Bursitis of left shoulder    Biceps tendinitis of left upper extremity     Past Medical History:   Diagnosis Date    Aneurysm ([facility] [address] [facility]     Arthritis     Cerebral artery occlusion with cerebral infarction [facility])     [address] 2016    Hypertension      Past Surgical History:   Procedure Laterality Date    SHOULDER ARTHROSCOPY Left     at [facility]       Allergies:  Pcn [penicillins]    Medications:  Prior to Visit Medications    Medication Sig Taking?  Authorizing Provider   guaiFENesin (MUCINEX) 600 MG extended release tablet Take 1 tablet by mouth 2 times daily Yes Rudy Mittal CNP   sodium chloride (ALTAMIST SPRAY) 0.65 % nasal spray 1 spray by Nasal route as needed for Congestion Yes Rudy Mittal CNP   aspirin 81 MG tablet Take 1 tablet by mouth daily Yes Rudy Mittal CNP   meloxicam (MOBIC) 7.5 MG tablet Take 1 tablet by mouth daily Take 1 tablet daily Yes Rudy Mittal CNP   Cholecalciferol (VITAMIN D) 2000 units TABS tablet Take 1 tablet by mouth daily Yes Ruyd Mittal CNP   labetalol (NORMODYNE) 300 MG tablet Take 1 tablet by mouth daily Yes Rudy Mittal CNP   amLODIPine (NORVASC) 10 MG tablet Take 1 tablet by mouth daily Yes Rudy Mittal CNP   pantoprazole (PROTONIX) 40 MG tablet Take 1 tablet by mouth every morning (before breakfast) Yes Rudy Mittal CNP   lisinopril (PRINIVIL) 20 MG tablet Take 1 tablet by mouth daily Yes Rudy Mittal CNP   Blood Pressure KIT Speed's Test      Provocative Tests for [facility] Joint Pathology:  [] Negative  Positive Test  [x] Cross Body ADD Test     Motor Function:  [x] No gross motor weakness  [] Motor weakness:  [] mild  [] moderate  [] severe         Neurologic:  [x] Sensation to light touch intact   [x] Coordination-proprioception intact      Circulation:  [x] The limb is warm and well perfused  [x] Capillary refill is intact  [x] Edema:  [x] none  [] mild  [] moderate  [] severe    Contralateral Shoulder:  [x] No pain with ROM     Data Reviewed:     No imaging studies were obtained today. XRays:  (1 view: lateral) of his C-spine taken 11/17/17 showed mild degenerative changes C5-C6.                (3 views: AP, Y views and axillary) of his left shoulder taken 11/17/17 showed moderate degenerative changes of the acromioclavicular joint with spurring noted inferior to the acromion. PROCEDURE NOTE: LEFT SHOULDER INJECTION  12/12/2017 at 4:38 PM   Procedure: Injection  Verbal consent was obtained. Risks and benefits were explained. Questions were encouraged and answered. Timeout Verification Completed including:    Correct patient: Jordi Quick was identified    Correct procedure    Correct site & side    Correct equipment and supplies    Staff member: Kathryn Whyte PA-C    Assistant: Hugo Mohr     Injection Site(s): Posterolateral    The injection site was prepped with Chlora-Prep using aseptic technique and a left shoulder injection was performed with ethyl chloride for anesthetic. Lidocaine 1% 4 ml with Depomedrol 2 ml (40 mg/ml = 80 mg total) was injected. A sterile adhesive dressing was applied. Post procedure:  Jordi Quick tolerated the treatment well. Instructions to patient:  Appropriate post injections instructions were given to Jordi Quick. Assessment (Medical Decision Making):     Jordi Quick is a 56y.o. year old male with the following diagnosis:    1.  Chronic left shoulder pain 2. Arthritis of left acromioclavicular joint     3. Rotator cuff tendinitis, left     4. Bursitis of left shoulder     5. Biceps tendinitis of left upper extremity         His overall course:       []  Responding adequately to ongoing treatment    []  Worsening despite conservative treatment    [x]  Unchanged despite conservative treatment    Plan (Medical Decision Making):      I discussed the diagnosis and the treatment options with Prasad Santoyo today. In Summary:  1). The various treatment options were outlined and discussed with Prasad Santoyo including:      [x] Conservative care options:      physical therapy, ice, NSAID's and activity modification       [x] The indications for therapeutic injections    2). After considering the various options discussed, Prasad Santoyo elected to pursue a course of treatment that includes the following:      [x] MEDICATIONS/REFILLS prescribed today are listed below. No refills today. He may continue to take his Mobic. [x] Physical Therapy/HEP Activities as tolerated, formal physical therapy as recommended on his prior visit. [] Script: 2 x per week x 4 weeks    [x] Ice to affected area: 15-20 minutes 4 x day    [x] Injection today into his left shoulder     Return to Clinic/Follow - Up:  Prasad Santoyo was asked to make a follow-up appointment:    [x] 6-8 weeks if needed    Prasad Santoyo was instructed to call the office if his symptoms worsen or if new symptoms appear prior to the next scheduled visit. He is specifically instructed to contact the office between now & his scheduled appointment if he has concerns related to his condition or if he needs assistance in scheduling the above tests. He is welcome to call for an appointment sooner if he has any additional concerns or questions.           Patient Education Materials Provided:    Patient Instructions     Prasad Santoyo was instructed to apply ice to the injection area for 15 - 20 minutes several please bring any errors to my attention for an addendum. All efforts were made to ensure that this office note is accurate. I have personally performed and/or participated in the history, physical exam and medical decision making and reviewed all pertinent clinical information unless otherwise noted.

## 2018-01-16 DIAGNOSIS — I10 ESSENTIAL HYPERTENSION: ICD-10-CM

## 2018-01-16 DIAGNOSIS — M19.90 ARTHRITIS: ICD-10-CM

## 2018-01-16 DIAGNOSIS — E55.9 VITAMIN D DEFICIENCY: ICD-10-CM

## 2018-01-16 RX ORDER — MELOXICAM 7.5 MG/1
7.5 TABLET ORAL DAILY
Qty: 30 TABLET | Refills: 2 | Status: SHIPPED | OUTPATIENT
Start: 2018-01-16 | End: 2018-02-01

## 2018-01-16 RX ORDER — CHOLECALCIFEROL (VITAMIN D3) 50 MCG
2000 TABLET ORAL DAILY
Qty: 30 TABLET | Refills: 3 | Status: SHIPPED | OUTPATIENT
Start: 2018-01-16 | End: 2018-06-05 | Stop reason: SDUPTHER

## 2018-01-16 RX ORDER — LISINOPRIL 20 MG/1
20 TABLET ORAL DAILY
Qty: 30 TABLET | Refills: 3 | Status: SHIPPED | OUTPATIENT
Start: 2018-01-16 | End: 2018-06-05 | Stop reason: SDUPTHER

## 2018-01-26 ENCOUNTER — OFFICE VISIT (OUTPATIENT)
Dept: ORTHOPEDIC SURGERY | Age: 58
End: 2018-01-26

## 2018-01-26 VITALS
HEIGHT: 66 IN | DIASTOLIC BLOOD PRESSURE: 88 MMHG | BODY MASS INDEX: 22.5 KG/M2 | HEART RATE: 64 BPM | WEIGHT: 140 LBS | SYSTOLIC BLOOD PRESSURE: 152 MMHG

## 2018-01-26 DIAGNOSIS — M75.52 BURSITIS OF LEFT SHOULDER: ICD-10-CM

## 2018-01-26 DIAGNOSIS — G89.29 CHRONIC LEFT SHOULDER PAIN: Primary | ICD-10-CM

## 2018-01-26 DIAGNOSIS — M75.22 BICEPS TENDINITIS OF LEFT UPPER EXTREMITY: ICD-10-CM

## 2018-01-26 DIAGNOSIS — M75.82 ROTATOR CUFF TENDINITIS, LEFT: ICD-10-CM

## 2018-01-26 DIAGNOSIS — M19.012 ARTHRITIS OF LEFT ACROMIOCLAVICULAR JOINT: ICD-10-CM

## 2018-01-26 DIAGNOSIS — M25.512 CHRONIC LEFT SHOULDER PAIN: Primary | ICD-10-CM

## 2018-01-26 PROCEDURE — 99999 PR OFFICE/OUTPT VISIT,PROCEDURE ONLY: CPT | Performed by: PHYSICIAN ASSISTANT

## 2018-01-26 PROCEDURE — 20610 DRAIN/INJ JOINT/BURSA W/O US: CPT | Performed by: PHYSICIAN ASSISTANT

## 2018-01-26 NOTE — PATIENT INSTRUCTIONS
Norco)  Tramadol (Ultram)  Other    These medications must have prescriptions which are written and signed by your provider. This means that you must call ahead and come in to the office to  the paper prescription and take it to your pharmacy. We are sorry for any inconvenience but this is now the law. Sylvain Russell PA-C  Physician Assistant, Orthopaedic Surgery    Contact Information:  Hortencia Hernandez,  & Clinical Staff  [phone], Option 3         IMPORTANT NARCOTIC INFORMATION: PLEASE READ     [x] DO NOT share your prescription medication with anyone! Sharing is illegal.  The prescription dose is based on your age, weight, and health problems. Sharing your narcotic prescription can lead to accidental death of the individual for which the prescription was not prescribed. You may not know about his/her addiction problem. [x] Always use the same pharmacy when filling your narcotic prescriptions. [x] DO NOT mix your narcotic prescription with alcohol. Mixing the narcotic prescription medication with alcohol causes depressive effects including breathing problems, organ malfunction, and cardiac arrest.     [x] Always keep your narcotic medication in a locked, secured location. Keep your medication private. This is to avoid individuals from taking your medication without your knowledge. This medication is highly sought after and locking your prescriptions will protect you from being a target of crime. [x] DO NOT stock pile your medication. This also will protect you from being a target of crime. [x] Dispose of any unused medications properly. Do not flush the medications. Look for appropriate waste collection programs in your community and drug take back events. [x] DO NOT drive while taking narcotic pain medication or muscle relaxers.          FOR MORE INFORMATION, CHECK OUT THIS RESOURCE    For your convenience, Dr. Mishel Vasquez has provided the following link

## 2018-01-26 NOTE — PROGRESS NOTES
Erectile Dysfunction Take one tablet daily as needed for erectile dysfunction. Bhaskar Rivera CNP   nicotine (NICODERM CQ) 14 MG/24HR Place 1 patch onto the skin daily  Bhaskar Rivera CNP   scopolamine (TRANSDERM-SCOP) transdermal patch Place 1 patch onto the skin every 72 hours  Lydia Crespo MD   ondansetron (ZOFRAN) 4 MG tablet Take 1 tablet by mouth every 8 hours as needed for Nausea  Lydia Crespo MD   nystatin (MYCOSTATIN) 103554 UNIT/ML suspension Take 5 mLs by mouth 4 times daily  Mary Ann Rubio MD     Social History     Social History    Marital status: Single     Spouse name: N/A    Number of children: N/A    Years of education: N/A     Occupational History    Not on file. Social History Main Topics    Smoking status: Current Every Day Smoker     Packs/day: 1.00     Years: 35.00     Types: Cigarettes    Smokeless tobacco: Never Used    Alcohol use No    Drug use: No    Sexual activity: Yes     Partners: Female     Other Topics Concern    Not on file     Social History Narrative    No narrative on file     Family History   Problem Relation Age of Onset    High Blood Pressure Mother        Review of Systems (ROS):    [x]Performed. Chavez Marla Overton's review of systems has been performed by intake and observation. The most recent ROS was scanned into the media tab of the chart on 11/17/2017. He has been instructed to contact his primary care physician regarding ROS issues if not already being addressed at this time. There are no recent changes. Objective:   Physical Exam  Vital Signs:  BP (!) 152/88   Pulse 64   Ht 5' 6\" (1.676 m)   Wt 140 lb (63.5 kg)   BMI 22.60 kg/m²     Constitution:  Generally, Moo Velasquez is [x] alert, [x] appears stated age, and [x] in no distress.   His general body habitus is [] Cachectic  [] Thin  [x] Normal  [] Obese  [] Morbidly Obese    Head: [x] Normocephalic  Eyes: [x] Extra-occular muscles intact    Left Ear: [x] External Ear normal   Right Ear: [x] External Ear normal   Nose: [x] Normal  Mouth: [x] Oral mucosa moist  [x] No perioral lesions [x]  Missing multiple teeth  Pulmonary: [x] Respirations unlabored and regular  Skin: [x] Warm [x] Well perfused     Psychiatric:   [x] Good judgement and insight  [x] Oriented to [x] person, [x] place, and [x] time. [x] Mood appropriate for circumstances.     Gait:   Gait is [x] Normal  [] Impaired  Assistive Device: [x] None  [] Knee Brace  [] Cane  [] Crutches   [] Aleatha Standard   [] Wheelchair  [] Other    [facility]   Inspection:  [x] Skin intact without abrasion, lacerations or rashes  [x] Normal neck alignment  [x] Scar / Surgical incision: [x] none  [] Anterior [] Posterior  [x] Ecchymosis: [x] none  [] mild  [] moderate  [] severe  [x] Atrophy:  [x] none [] mild  [] moderate  [] severe      ORTHOPAEDIC SHOULDER EXAM: LEFT   Inspection:  [x] Skin intact without abrasion, lacerations or rashes  [x] Ecchymosis:  [x] none  [] mild  [] moderate  [] severe  [x] Atrophy:  [x] none  [] mild  [] moderate  [] severe  [] Deformity: [] Biceps  [] Scapular Winging  [x] Scar / Surgical incision(s): Well-healed 2 inch vertical incision on the anterior shoulder      Range of Motion:  [] Normal ROM    [] Deferred: acute injury/post-surgery/pain   [x] Limited ROM:  [x] ABduction: 0-110 degrees with pain at end range  [x] Forward Flexion: 0-120 degrees with pain at end range  [x] Internal Rotation: with discomfort     Palpation:   [] No Tenderness  [x] Tenderness: AC joint, proximal biceps, anterior/lateral deltoid  [x] mild  [] moderate  [] severe  [x] A-C Joint  [x] Proximal Biceps    Provocative Tests for Rotator Cuff:   [] Negative:   Positive Tests:  [x] Neer's (Impingement) Test   [x] Hawkin's (Impingement) Test     Provocative Tests for Biceps Tendon/SLAP:   [] Negative  Positive Test  [x] Speed's Test      Provocative Tests for [facility] Joint Pathology:  [] Negative  Positive Test  [x] Cross Body ADD Test     Motor Function:  [x] No gross motor weakness  [] Motor weakness:  [] mild  [] moderate  [] severe         Neurologic:  [x] Sensation to light touch intact   [x] Coordination-proprioception intact      Circulation:  [x] The limb is warm and well perfused  [x] Capillary refill is intact  [x] Edema:  [x] none  [] mild  [] moderate  [] severe    Contralateral Shoulder:  [x] No pain with ROM     Data Reviewed:     No imaging studies were obtained today.     XRays:  (1 view: lateral) of his C-spine taken 11/17/17 showed mild degenerative changes C5-C6.     (3 views: AP, Y views and axillary) of his left shoulder taken 11/17/17 showed moderate degenerative changes of the acromioclavicular joint with spurring noted inferior to the acromion. PROCEDURE NOTE: LEFT SHOULDER INJECTION  1/26/2018 at 3:09 PM   Procedure: Injection  Verbal consent was obtained. Risks and benefits were explained. Questions were encouraged and answered. Timeout Verification Completed including:    Correct patient: Soto Bess was identified    Correct procedure    Correct site & side    Correct equipment and supplies    Staff member: Josias Mims PA-C    Assistant: Kieran Perkins     Injection Site(s): Posterolateral    The injection site was prepped with Chlora-Prep using aseptic technique and a left shoulder injection was performed with ethyl chloride for anesthetic. Lidocaine 1% 4 ml with Depomedrol 2 ml (40 mg/ml = 80 mg total) was injected. A sterile adhesive dressing was applied. Post procedure:  Soto Bess tolerated the treatment well. Instructions to patient:  Appropriate post injections instructions were given to Soto Bess. Assessment (Medical Decision Making):     Soto Bess is a 56y.o. year old male with the following diagnosis:    1.  Chronic left shoulder pain  NC ARTHROCENTESIS ASPIR&/INJ MAJOR JT/BURSA W/O US    NC METHYLPREDNISOLONE 40 MG INJ    Rebecca Ernst MD (orthopedic an addendum. All efforts were made to ensure that this office note is accurate. I have personally performed and/or participated in the history, physical exam and medical decision making and reviewed all pertinent clinical information unless otherwise noted.

## 2018-02-01 ENCOUNTER — OFFICE VISIT (OUTPATIENT)
Dept: INTERNAL MEDICINE CLINIC | Age: 58
End: 2018-02-01

## 2018-02-01 VITALS
RESPIRATION RATE: 18 BRPM | DIASTOLIC BLOOD PRESSURE: 88 MMHG | OXYGEN SATURATION: 98 % | WEIGHT: 140 LBS | HEART RATE: 62 BPM | BODY MASS INDEX: 22.6 KG/M2 | SYSTOLIC BLOOD PRESSURE: 130 MMHG

## 2018-02-01 DIAGNOSIS — I10 ESSENTIAL HYPERTENSION: Primary | ICD-10-CM

## 2018-02-01 DIAGNOSIS — Z12.11 ENCOUNTER FOR SCREENING COLONOSCOPY: ICD-10-CM

## 2018-02-01 DIAGNOSIS — D49.2 ABNORMAL SKIN GROWTH: ICD-10-CM

## 2018-02-01 PROCEDURE — G8427 DOCREV CUR MEDS BY ELIG CLIN: HCPCS | Performed by: NURSE PRACTITIONER

## 2018-02-01 PROCEDURE — G8484 FLU IMMUNIZE NO ADMIN: HCPCS | Performed by: NURSE PRACTITIONER

## 2018-02-01 PROCEDURE — 4004F PT TOBACCO SCREEN RCVD TLK: CPT | Performed by: NURSE PRACTITIONER

## 2018-02-01 PROCEDURE — 3017F COLORECTAL CA SCREEN DOC REV: CPT | Performed by: NURSE PRACTITIONER

## 2018-02-01 PROCEDURE — 99214 OFFICE O/P EST MOD 30 MIN: CPT | Performed by: NURSE PRACTITIONER

## 2018-02-01 PROCEDURE — G8420 CALC BMI NORM PARAMETERS: HCPCS | Performed by: NURSE PRACTITIONER

## 2018-02-01 RX ORDER — AMLODIPINE BESYLATE 10 MG/1
10 TABLET ORAL DAILY
Qty: 30 TABLET | Refills: 3 | Status: SHIPPED | OUTPATIENT
Start: 2018-02-01 | End: 2018-06-05 | Stop reason: SDUPTHER

## 2018-02-01 RX ORDER — LABETALOL 300 MG/1
300 TABLET, FILM COATED ORAL DAILY
Qty: 30 TABLET | Refills: 2 | Status: SHIPPED | OUTPATIENT
Start: 2018-02-01 | End: 2018-06-05 | Stop reason: SDUPTHER

## 2018-02-01 ASSESSMENT — ENCOUNTER SYMPTOMS
HOARSE VOICE: 0
COUGH: 0
ORTHOPNEA: 0
RESPIRATORY NEGATIVE: 1
HEARTBURN: 0

## 2018-02-01 NOTE — PATIENT INSTRUCTIONS
everyone, because it can cause serious bleeding. · See your doctor regularly. You may need to see the doctor more often at first or until your blood pressure comes down. · If you are taking blood pressure medicine, talk to your doctor before you take decongestants or anti-inflammatory medicine, such as ibuprofen. Some of these medicines can raise blood pressure. · Learn how to check your blood pressure at home. Lifestyle changes  · Stay at a healthy weight. This is especially important if you put on weight around the waist. Losing even 10 pounds can help you lower your blood pressure. · If your doctor recommends it, get more exercise. Walking is a good choice. Bit by bit, increase the amount you walk every day. Try for at least 30 minutes on most days of the week. You also may want to swim, bike, or do other activities. · Avoid or limit alcohol. Talk to your doctor about whether you can drink any alcohol. · Try to limit how much sodium you eat to less than 2,300 milligrams (mg) a day. Your doctor may ask you to try to eat less than 1,500 mg a day. · Eat plenty of fruits (such as bananas and oranges), vegetables, legumes, whole grains, and low-fat dairy products. · Lower the amount of saturated fat in your diet. Saturated fat is found in animal products such as milk, cheese, and meat. Limiting these foods may help you lose weight and also lower your risk for heart disease. · Do not smoke. Smoking increases your risk for heart attack and stroke. If you need help quitting, talk to your doctor about stop-smoking programs and medicines. These can increase your chances of quitting for good. When should you call for help? Call 911 anytime you think you may need emergency care. This may mean having symptoms that suggest that your blood pressure is causing a serious heart or blood vessel problem. Your blood pressure may be over 180/110. ? For example, call 911 if:  ? · You have symptoms of a heart attack.  These care if:  ? · You have new or different belly pain. ? · Your stools are black and tarlike or have streaks of blood. ? Watch closely for changes in your health, and be sure to contact your doctor if:  ? · Your symptoms have not improved after 2 days. ? · Food seems to catch in your throat or chest.   Where can you learn more? Go to https://[employer]Gene[employer]picSearchForce.S[employer]Tr[employer]. org and sign in to your [employer] account. Enter O766 in the [employer] box to learn more about \"Gastroesophageal Reflux Disease (GERD): Care Instructions. \"     If you do not have an account, please click on the \"Sign Up Now\" link. Current as of: May 12, 2017  Content Version: 11.5  © 2287-2657 Healthwise, Incorporated. Care instructions adapted under license by [facility] ([facility]). If you have questions about a medical condition or this instruction, always ask your healthcare professional. [address] any warranty or liability for your use of this information.

## 2018-02-01 NOTE — PROGRESS NOTES
Subjective:      Patient ID: Moise Hansen is a 56 y.o. male who presents for f/u HTN/ GERD/ ARTHRITIS. P atient reports going to orthopaedic doctor is helping with left shoulder pain. Patient reports compliance with medication regimen. Patient is in no distress. Chief Complaint   Patient presents with    Hypertension     3 month f/u    Gastroesophageal Reflux    Arthritis     left shoulder     Vitals:    02/01/18 1528   BP: 130/88   Site: Right Arm   Position: Sitting   Cuff Size: Small Adult   Pulse: 62   Resp: 18   SpO2: 98%   Weight: 140 lb (63.5 kg)       Current Outpatient Prescriptions   Medication Sig Dispense Refill    labetalol (NORMODYNE) 300 MG tablet Take 1 tablet by mouth daily 30 tablet 2    amLODIPine (NORVASC) 10 MG tablet Take 1 tablet by mouth daily 30 tablet 3    Cholecalciferol (VITAMIN D) 2000 units TABS tablet Take 1 tablet by mouth daily 30 tablet 3    lisinopril (PRINIVIL) 20 MG tablet Take 1 tablet by mouth daily 30 tablet 3    aspirin 81 MG tablet Take 1 tablet by mouth daily 30 tablet 2    guaiFENesin (MUCINEX) 600 MG extended release tablet Take 1 tablet by mouth 2 times daily 20 tablet 0    Blood Pressure KIT Check blood pressure once daily in the mornings. 1 kit 0    tadalafil (CIALIS) 5 MG tablet Take 1 tablet by mouth as needed for Erectile Dysfunction Take one tablet daily as needed for erectile dysfunction.  30 tablet 2    ondansetron (ZOFRAN) 4 MG tablet Take 1 tablet by mouth every 8 hours as needed for Nausea 20 tablet 1    sodium chloride (ALTAMIST SPRAY) 0.65 % nasal spray 1 spray by Nasal route as needed for Congestion 1 Bottle 3    nicotine (NICODERM CQ) 14 MG/24HR Place 1 patch onto the skin daily 10 patch 0    scopolamine (TRANSDERM-SCOP) transdermal patch Place 1 patch onto the skin every 72 hours 10 patch 1    nystatin (MYCOSTATIN) 488369 UNIT/ML suspension Take 5 mLs by mouth 4 times daily 180 mL 0     No current facility-administered medications for this visit. Hypertension   This is a chronic problem. The current episode started more than 1 year ago. The problem has been gradually improving since onset. The problem is controlled. Pertinent negatives include no anxiety, chest pain, orthopnea or peripheral edema. There are no associated agents to hypertension. Risk factors for coronary artery disease include male gender, dyslipidemia, smoking/tobacco exposure, sedentary lifestyle and post-menopausal state. Past treatments include beta blockers, calcium channel blockers and ACE inhibitors. The current treatment provides moderate improvement. There are no compliance problems. Gastroesophageal Reflux   He reports no chest pain, no coughing, no early satiety, no heartburn or no hoarse voice. This is a chronic problem. The current episode started more than 1 year ago. The problem occurs occasionally. The problem has been gradually improving. The symptoms are aggravated by smoking and certain foods. Risk factors include smoking/tobacco exposure. He has tried a diet change for the symptoms. The treatment provided moderate relief. Shoulder Pain    The pain is present in the left shoulder. This is a chronic problem. The current episode started more than 1 year ago. There has been no history of extremity trauma. The problem occurs intermittently. The problem has been gradually improving. The quality of the pain is described as aching. The pain is at a severity of 4/10. The patient is experiencing no pain. The symptoms are aggravated by activity. He has tried rest and NSAIDS for the symptoms. The treatment provided moderate relief. History obtained from chart review and the patient    Past Surgical History:   Procedure Laterality Date    [facility]     [facility]     Family History   Problem Relation Age of Onset    High Blood Pressure Mother      .   Past Medical History:   Diagnosis Date    Aneurysm ([address]     Arthritis     Cerebral

## 2018-02-12 ENCOUNTER — HOSPITAL ENCOUNTER (OUTPATIENT)
Dept: OTHER | Age: 58
Discharge: HOME | End: 2018-02-19
Attending: PHYSICIAN ASSISTANT | Admitting: PHYSICIAN ASSISTANT

## 2018-02-12 NOTE — PROGRESS NOTES
Physical Therapy      Physical Therapy  Cancellation/No-show Note  Patient Name:  Elver Haley  :  1960   Date:  2018  Cancelled visits to date: 0  No-shows to date: 1    For today's appointment patient:  []  Cancelled  []  Rescheduled appointment  [x]  No-show     Reason given by patient:  []  Patient ill  []  Conflicting appointment  []  No transportation    []  Conflict with work  []  No reason given  []  Other:     Comments:      Electronically signed by:  Tali Phillips [id number]

## 2018-06-04 ENCOUNTER — TELEPHONE (OUTPATIENT)
Dept: INTERNAL MEDICINE CLINIC | Age: 58
End: 2018-06-04

## 2018-06-04 DIAGNOSIS — I10 ESSENTIAL HYPERTENSION: ICD-10-CM

## 2018-06-04 DIAGNOSIS — E55.9 VITAMIN D DEFICIENCY: ICD-10-CM

## 2018-06-05 RX ORDER — LABETALOL 300 MG/1
300 TABLET, FILM COATED ORAL DAILY
Qty: 30 TABLET | Refills: 3 | Status: SHIPPED | OUTPATIENT
Start: 2018-06-05 | End: 2018-12-06 | Stop reason: SDUPTHER

## 2018-06-05 RX ORDER — LISINOPRIL 20 MG/1
20 TABLET ORAL DAILY
Qty: 30 TABLET | Refills: 3 | Status: SHIPPED | OUTPATIENT
Start: 2018-06-05 | End: 2018-12-06

## 2018-06-05 RX ORDER — AMLODIPINE BESYLATE 10 MG/1
10 TABLET ORAL DAILY
Qty: 30 TABLET | Refills: 3 | Status: SHIPPED | OUTPATIENT
Start: 2018-06-05 | End: 2018-12-06 | Stop reason: SDUPTHER

## 2018-06-05 RX ORDER — CHOLECALCIFEROL (VITAMIN D3) 50 MCG
2000 TABLET ORAL DAILY
Qty: 30 TABLET | Refills: 3 | Status: SHIPPED | OUTPATIENT
Start: 2018-06-05 | End: 2018-12-06 | Stop reason: SDUPTHER

## 2018-07-11 ENCOUNTER — OFFICE VISIT (OUTPATIENT)
Dept: INTERNAL MEDICINE CLINIC | Age: 58
End: 2018-07-11

## 2018-07-11 VITALS
TEMPERATURE: 98 F | BODY MASS INDEX: 23.46 KG/M2 | HEART RATE: 57 BPM | HEIGHT: 66 IN | DIASTOLIC BLOOD PRESSURE: 80 MMHG | OXYGEN SATURATION: 98 % | SYSTOLIC BLOOD PRESSURE: 146 MMHG | WEIGHT: 146 LBS

## 2018-07-11 DIAGNOSIS — Z00.00 HEALTHCARE MAINTENANCE: ICD-10-CM

## 2018-07-11 DIAGNOSIS — R05.9 COUGH: ICD-10-CM

## 2018-07-11 DIAGNOSIS — I10 ESSENTIAL HYPERTENSION: Primary | ICD-10-CM

## 2018-07-11 DIAGNOSIS — R09.89 CHEST CONGESTION: ICD-10-CM

## 2018-07-11 DIAGNOSIS — Z23 NEED FOR STREPTOCOCCUS PNEUMONIAE VACCINATION: ICD-10-CM

## 2018-07-11 DIAGNOSIS — F17.210 CIGARETTE NICOTINE DEPENDENCE WITHOUT COMPLICATION: ICD-10-CM

## 2018-07-11 DIAGNOSIS — Z23 NEED FOR PROPHYLACTIC VACCINATION AGAINST STREPTOCOCCUS PNEUMONIAE (PNEUMOCOCCUS): ICD-10-CM

## 2018-07-11 DIAGNOSIS — Z71.3 NUTRITIONAL COUNSELING: ICD-10-CM

## 2018-07-11 PROBLEM — N52.9 ED (ERECTILE DYSFUNCTION): Status: ACTIVE | Noted: 2018-07-11

## 2018-07-11 PROCEDURE — 3017F COLORECTAL CA SCREEN DOC REV: CPT | Performed by: NURSE PRACTITIONER

## 2018-07-11 PROCEDURE — G8420 CALC BMI NORM PARAMETERS: HCPCS | Performed by: NURSE PRACTITIONER

## 2018-07-11 PROCEDURE — 99213 OFFICE O/P EST LOW 20 MIN: CPT | Performed by: NURSE PRACTITIONER

## 2018-07-11 PROCEDURE — 90471 IMMUNIZATION ADMIN: CPT | Performed by: NURSE PRACTITIONER

## 2018-07-11 PROCEDURE — 90732 PPSV23 VACC 2 YRS+ SUBQ/IM: CPT | Performed by: NURSE PRACTITIONER

## 2018-07-11 PROCEDURE — 4004F PT TOBACCO SCREEN RCVD TLK: CPT | Performed by: NURSE PRACTITIONER

## 2018-07-11 PROCEDURE — G8427 DOCREV CUR MEDS BY ELIG CLIN: HCPCS | Performed by: NURSE PRACTITIONER

## 2018-07-11 RX ORDER — GUAIFENESIN 600 MG/1
600 TABLET, EXTENDED RELEASE ORAL 2 TIMES DAILY
Qty: 60 TABLET | Refills: 2 | Status: SHIPPED | OUTPATIENT
Start: 2018-07-11 | End: 2019-03-04 | Stop reason: SDUPTHER

## 2018-07-11 RX ORDER — TETANUS AND DIPHTHERIA TOXOIDS ADSORBED 2; 2 [LF]/.5ML; [LF]/.5ML
0.5 INJECTION INTRAMUSCULAR ONCE
Refills: 0 | Status: CANCELLED | OUTPATIENT
Start: 2018-07-11 | End: 2018-07-11

## 2018-07-11 RX ORDER — LABETALOL 300 MG/1
300 TABLET, FILM COATED ORAL DAILY
Qty: 30 TABLET | Refills: 3 | Status: CANCELLED | OUTPATIENT
Start: 2018-07-11

## 2018-07-11 ASSESSMENT — PATIENT HEALTH QUESTIONNAIRE - PHQ9
2. FEELING DOWN, DEPRESSED OR HOPELESS: 0
1. LITTLE INTEREST OR PLEASURE IN DOING THINGS: 0
SUM OF ALL RESPONSES TO PHQ9 QUESTIONS 1 & 2: 0
SUM OF ALL RESPONSES TO PHQ QUESTIONS 1-9: 0

## 2018-07-11 ASSESSMENT — ENCOUNTER SYMPTOMS: RESPIRATORY NEGATIVE: 1

## 2018-07-11 NOTE — PROGRESS NOTES
OR GO TO THE NEAREST EMERGENCY ROOM FOR CHEST PAIN / [facility]. RETURN IN 6 WEEKS FOR F/U HTN  / AS NEEDED.

## 2018-07-11 NOTE — PATIENT INSTRUCTIONS
prescription medicines. They do not contain nicotine. They help you by reducing withdrawal symptoms, such as stress and anxiety. · Some people find hypnosis, acupuncture, and massage helpful for ending the smoking habit. · Eat a healthy diet and get regular exercise. Having healthy habits will help your body move past its craving for nicotine. · Be prepared to keep trying. Most people are not successful the first few times they try to quit. Do not get mad at yourself if you smoke again. Make a list of things you learned and think about when you want to try again, such as next week, next month, or next year. Where can you learn more? Go to https://Rocketickpepiceweb.[employer]. org and sign in to your Flypad account. Enter B734 in the [employer] box to learn more about \"Stopping Smoking: Care Instructions. \"     If you do not have an account, please click on the \"Sign Up Now\" link. Current as of: November 29, 2017  Content Version: 11.6  © 8385-9841 [employer]. Care instructions adapted under license by [facility] ([facility]). If you have questions about a medical condition or this instruction, always ask your healthcare professional. [place] [address] any warranty or liability for your use of this information. Patient Education        High Blood Pressure: Care Instructions  Your Care Instructions    If your blood pressure is usually above 130/80, you have high blood pressure, or hypertension. That means the top number is 130 or higher or the bottom number is 80 or higher, or both. Despite what a lot of people think, high blood pressure usually doesn't cause headaches or make you feel dizzy or lightheaded. It usually has no symptoms. But it does increase your risk for heart attack, stroke, and kidney or eye damage. The higher your blood pressure, the more your risk increases. Your doctor will give you a goal for your blood pressure.  Your goal will be based on your health milligrams (mg) a day. Your doctor may ask you to try to eat less than 1,500 mg a day. · Eat plenty of fruits (such as bananas and oranges), vegetables, legumes, whole grains, and low-fat dairy products. · Lower the amount of saturated fat in your diet. Saturated fat is found in animal products such as milk, cheese, and meat. Limiting these foods may help you lose weight and also lower your risk for heart disease. · Do not smoke. Smoking increases your risk for heart attack and stroke. If you need help quitting, talk to your doctor about stop-smoking programs and medicines. These can increase your chances of quitting for good. When should you call for help? Call 911 anytime you think you may need emergency care. This may mean having symptoms that suggest that your blood pressure is causing a serious heart or blood vessel problem. Your blood pressure may be over 180/110.   For example, call 911 if:    · You have symptoms of a heart attack. These may include:  ¨ Chest pain or pressure, or a strange feeling in the chest.  ¨ Sweating. ¨ Shortness of breath. ¨ Nausea or vomiting. ¨ Pain, pressure, or a strange feeling in the back, neck, jaw, or upper belly or in one or both shoulders or arms. ¨ Lightheadedness or sudden weakness. ¨ A fast or irregular heartbeat.     · You have symptoms of a stroke. These may include:  ¨ Sudden numbness, tingling, weakness, or loss of movement in your face, arm, or leg, especially on only one side of your body. ¨ Sudden vision changes. ¨ Sudden trouble speaking. ¨ Sudden confusion or trouble understanding simple statements. ¨ Sudden problems with walking or balance. ¨ A sudden, severe headache that is different from past headaches.     · You have severe back or belly pain.    Do not wait until your blood pressure comes down on its own.  Get help right away.   Call your doctor now or seek immediate care if:    · Your blood pressure is much higher than normal (such as 180/110 and ketchup. Or make your own salad dressings and sauces without adding salt. · Use less salt (or none) when recipes call for it. You can often use half the salt a recipe calls for without losing flavor. Other foods such as rice, pasta, and grains do not need added salt. · Rinse canned vegetables, and cook them in fresh water. This removes some-but not all-of the salt. · Avoid water that is naturally high in sodium or that has been treated with water softeners, which add sodium. Call your local water company to find out the sodium content of your water supply. If you buy bottled water, read the label and choose a sodium-free brand. Avoid high-sodium foods  · Avoid eating:  ¨ Smoked, cured, salted, and canned meat, fish, and poultry. ¨ Ham, andrew, hot dogs, and luncheon meats. ¨ Regular, hard, and processed cheese and regular peanut butter. ¨ Crackers with salted tops, and other salted snack foods such as pretzels, chips, and salted popcorn. ¨ Frozen prepared meals, unless labeled low-sodium. ¨ Canned and dried soups, broths, and bouillon, unless labeled sodium-free or low-sodium. ¨ Canned vegetables, unless labeled sodium-free or low-sodium. ¨ Western Carole fries, pizza, tacos, and other fast foods. ¨ Pickles, olives, ketchup, and other condiments, especially soy sauce, unless labeled sodium-free or low-sodium. Where can you learn more? Go to https://eHi Car RentalgonsaloWeixinhai.[employer]. org and sign in to your UShealthrecord account. Enter V052 in the [facility] box to learn more about \"Low Sodium Diet (2,000 Milligram): Care Instructions. \"     If you do not have an account, please click on the \"Sign Up Now\" link. Current as of: May 12, 2017  Content Version: 11.6  © 4523-8099 [employer]. Care instructions adapted under license by [facility] ([facility]).  If you have questions about a medical condition or this instruction, always ask your healthcare professional. Xochitl Mitchell any warranty or liability for your use of this information.

## 2018-12-06 ENCOUNTER — OFFICE VISIT (OUTPATIENT)
Dept: INTERNAL MEDICINE CLINIC | Age: 58
End: 2018-12-06
Payer: COMMERCIAL

## 2018-12-06 VITALS
HEIGHT: 66 IN | WEIGHT: 145.6 LBS | OXYGEN SATURATION: 94 % | BODY MASS INDEX: 23.4 KG/M2 | SYSTOLIC BLOOD PRESSURE: 138 MMHG | DIASTOLIC BLOOD PRESSURE: 88 MMHG | HEART RATE: 70 BPM

## 2018-12-06 DIAGNOSIS — Z00.00 HEALTHCARE MAINTENANCE: ICD-10-CM

## 2018-12-06 DIAGNOSIS — Z12.5 PROSTATE CANCER SCREENING: ICD-10-CM

## 2018-12-06 DIAGNOSIS — G89.29 CHRONIC LEFT SHOULDER PAIN: ICD-10-CM

## 2018-12-06 DIAGNOSIS — E55.9 VITAMIN D DEFICIENCY: ICD-10-CM

## 2018-12-06 DIAGNOSIS — I10 ESSENTIAL HYPERTENSION: Primary | ICD-10-CM

## 2018-12-06 DIAGNOSIS — R79.89 LOW TESTOSTERONE LEVEL IN MALE: ICD-10-CM

## 2018-12-06 DIAGNOSIS — I10 ESSENTIAL HYPERTENSION: ICD-10-CM

## 2018-12-06 DIAGNOSIS — K21.9 GASTROESOPHAGEAL REFLUX DISEASE WITHOUT ESOPHAGITIS: ICD-10-CM

## 2018-12-06 DIAGNOSIS — M25.512 CHRONIC LEFT SHOULDER PAIN: ICD-10-CM

## 2018-12-06 LAB
A/G RATIO: 1.8 (ref 1.1–2.2)
ALBUMIN SERPL-MCNC: 4.5 G/DL (ref 3.4–5)
ALP BLD-CCNC: 91 U/L (ref 40–129)
ALT SERPL-CCNC: 9 U/L (ref 10–40)
ANION GAP SERPL CALCULATED.3IONS-SCNC: 11 MMOL/L (ref 3–16)
AST SERPL-CCNC: 14 U/L (ref 15–37)
BILIRUB SERPL-MCNC: 0.4 MG/DL (ref 0–1)
BILIRUBIN URINE: ABNORMAL
BLOOD, URINE: NEGATIVE
BUN BLDV-MCNC: 15 MG/DL (ref 7–20)
CALCIUM SERPL-MCNC: 9.3 MG/DL (ref 8.3–10.6)
CHLORIDE BLD-SCNC: 105 MMOL/L (ref 99–110)
CHOLESTEROL, TOTAL: 150 MG/DL (ref 0–199)
CLARITY: CLEAR
CO2: 24 MMOL/L (ref 21–32)
COLOR: ABNORMAL
CREAT SERPL-MCNC: 0.9 MG/DL (ref 0.9–1.3)
EPITHELIAL CELLS, UA: 3 /HPF (ref 0–5)
GFR AFRICAN AMERICAN: >60
GFR NON-AFRICAN AMERICAN: >60
GLOBULIN: 2.5 G/DL
GLUCOSE BLD-MCNC: 131 MG/DL (ref 70–99)
GLUCOSE URINE: NEGATIVE MG/DL
HCT VFR BLD CALC: 40.8 % (ref 40.5–52.5)
HDLC SERPL-MCNC: 51 MG/DL (ref 40–60)
HEMOGLOBIN: 13.3 G/DL (ref 13.5–17.5)
HYALINE CASTS: 2 /LPF (ref 0–8)
KETONES, URINE: ABNORMAL MG/DL
LDL CHOLESTEROL CALCULATED: 88 MG/DL
LEUKOCYTE ESTERASE, URINE: NEGATIVE
MCH RBC QN AUTO: 29.5 PG (ref 26–34)
MCHC RBC AUTO-ENTMCNC: 32.6 G/DL (ref 31–36)
MCV RBC AUTO: 90.6 FL (ref 80–100)
MICROSCOPIC EXAMINATION: YES
NITRITE, URINE: NEGATIVE
PDW BLD-RTO: 14.4 % (ref 12.4–15.4)
PH UA: 5.5
PLATELET # BLD: 165 K/UL (ref 135–450)
PMV BLD AUTO: 11.3 FL (ref 5–10.5)
POTASSIUM SERPL-SCNC: 4 MMOL/L (ref 3.5–5.1)
PROSTATE SPECIFIC ANTIGEN: 0.4 NG/ML (ref 0–4)
PROTEIN UA: 30 MG/DL
RBC # BLD: 4.51 M/UL (ref 4.2–5.9)
RBC UA: 6 /HPF (ref 0–4)
SODIUM BLD-SCNC: 140 MMOL/L (ref 136–145)
SPECIFIC GRAVITY UA: >1.03
TOTAL PROTEIN: 7 G/DL (ref 6.4–8.2)
TRIGL SERPL-MCNC: 55 MG/DL (ref 0–150)
URINE TYPE: ABNORMAL
UROBILINOGEN, URINE: 1 E.U./DL
VLDLC SERPL CALC-MCNC: 11 MG/DL
WBC # BLD: 3.4 K/UL (ref 4–11)
WBC UA: 4 /HPF (ref 0–5)

## 2018-12-06 PROCEDURE — G8484 FLU IMMUNIZE NO ADMIN: HCPCS | Performed by: INTERNAL MEDICINE

## 2018-12-06 PROCEDURE — 99396 PREV VISIT EST AGE 40-64: CPT | Performed by: INTERNAL MEDICINE

## 2018-12-06 RX ORDER — LABETALOL 300 MG/1
300 TABLET, FILM COATED ORAL DAILY
Qty: 30 TABLET | Refills: 5 | Status: SHIPPED | OUTPATIENT
Start: 2018-12-06 | End: 2019-03-05 | Stop reason: SDUPTHER

## 2018-12-06 RX ORDER — CHOLECALCIFEROL (VITAMIN D3) 50 MCG
2000 TABLET ORAL DAILY
Qty: 30 TABLET | Refills: 5 | Status: SHIPPED | OUTPATIENT
Start: 2018-12-06 | End: 2019-03-05 | Stop reason: SDUPTHER

## 2018-12-06 RX ORDER — OLMESARTAN MEDOXOMIL 40 MG/1
40 TABLET ORAL DAILY
Qty: 30 TABLET | Refills: 3 | Status: SHIPPED | OUTPATIENT
Start: 2018-12-06 | End: 2019-01-16 | Stop reason: SDUPTHER

## 2018-12-06 RX ORDER — TADALAFIL 5 MG/1
5 TABLET ORAL PRN
Qty: 30 TABLET | Refills: 2 | Status: SHIPPED | OUTPATIENT
Start: 2018-12-06 | End: 2019-01-16 | Stop reason: SDUPTHER

## 2018-12-06 RX ORDER — AMLODIPINE BESYLATE 10 MG/1
10 TABLET ORAL DAILY
Qty: 30 TABLET | Refills: 5 | Status: SHIPPED | OUTPATIENT
Start: 2018-12-06 | End: 2019-03-05 | Stop reason: SDUPTHER

## 2018-12-06 ASSESSMENT — ENCOUNTER SYMPTOMS
EYES NEGATIVE: 1
GASTROINTESTINAL NEGATIVE: 1
RESPIRATORY NEGATIVE: 1

## 2018-12-06 ASSESSMENT — PATIENT HEALTH QUESTIONNAIRE - PHQ9
1. LITTLE INTEREST OR PLEASURE IN DOING THINGS: 0
SUM OF ALL RESPONSES TO PHQ QUESTIONS 1-9: 0
SUM OF ALL RESPONSES TO PHQ QUESTIONS 1-9: 0
2. FEELING DOWN, DEPRESSED OR HOPELESS: 0
SUM OF ALL RESPONSES TO PHQ9 QUESTIONS 1 & 2: 0

## 2018-12-07 ENCOUNTER — TELEPHONE (OUTPATIENT)
Dept: INTERNAL MEDICINE CLINIC | Age: 58
End: 2018-12-07

## 2018-12-07 DIAGNOSIS — R31.29 OTHER MICROSCOPIC HEMATURIA: Primary | ICD-10-CM

## 2018-12-07 RX ORDER — CIPROFLOXACIN 250 MG/1
250 TABLET, FILM COATED ORAL 2 TIMES DAILY
Qty: 6 TABLET | Refills: 0 | Status: SHIPPED | OUTPATIENT
Start: 2018-12-07 | End: 2018-12-10

## 2018-12-07 NOTE — TELEPHONE ENCOUNTER
Pt has questions about his urine results. He is wanting to know if he is contagious? Can you please contact pt with information?

## 2018-12-08 LAB
SEX HORMONE BINDING GLOBULIN: 43 NMOL/L (ref 11–80)
TESTOSTERONE FREE-NONMALE: 64.4 PG/ML (ref 47–244)
TESTOSTERONE TOTAL: 377 NG/DL (ref 220–1000)

## 2018-12-10 LAB
PROSTATE SPECIFIC ANTIGEN FREE: <0.1 UG/L
PROSTATE SPECIFIC ANTIGEN PERCENT FREE: 13.3 %
PROSTATE SPECIFIC ANTIGEN: 0.3 UG/L (ref 0–4)

## 2019-01-16 ENCOUNTER — OFFICE VISIT (OUTPATIENT)
Dept: INTERNAL MEDICINE CLINIC | Age: 59
End: 2019-01-16
Payer: COMMERCIAL

## 2019-01-16 VITALS
TEMPERATURE: 98.1 F | BODY MASS INDEX: 23.33 KG/M2 | HEIGHT: 66 IN | SYSTOLIC BLOOD PRESSURE: 136 MMHG | DIASTOLIC BLOOD PRESSURE: 76 MMHG | WEIGHT: 145.2 LBS | HEART RATE: 60 BPM

## 2019-01-16 DIAGNOSIS — Z71.3 DIETARY COUNSELING: ICD-10-CM

## 2019-01-16 DIAGNOSIS — M25.512 CHRONIC LEFT SHOULDER PAIN: ICD-10-CM

## 2019-01-16 DIAGNOSIS — F17.210 CIGARETTE NICOTINE DEPENDENCE WITHOUT COMPLICATION: ICD-10-CM

## 2019-01-16 DIAGNOSIS — R31.9 HEMATURIA, UNSPECIFIED TYPE: ICD-10-CM

## 2019-01-16 DIAGNOSIS — N52.9 ERECTILE DYSFUNCTION, UNSPECIFIED ERECTILE DYSFUNCTION TYPE: ICD-10-CM

## 2019-01-16 DIAGNOSIS — G89.29 CHRONIC LEFT SHOULDER PAIN: ICD-10-CM

## 2019-01-16 DIAGNOSIS — Z76.0 MEDICATION REFILL: ICD-10-CM

## 2019-01-16 DIAGNOSIS — I10 ESSENTIAL HYPERTENSION: Primary | ICD-10-CM

## 2019-01-16 DIAGNOSIS — K21.9 GASTROESOPHAGEAL REFLUX DISEASE WITHOUT ESOPHAGITIS: ICD-10-CM

## 2019-01-16 LAB
BILIRUBIN URINE: NEGATIVE
BLOOD, URINE: NEGATIVE
CLARITY: CLEAR
COLOR: YELLOW
GLUCOSE URINE: NEGATIVE MG/DL
KETONES, URINE: NEGATIVE MG/DL
LEUKOCYTE ESTERASE, URINE: NEGATIVE
MICROSCOPIC EXAMINATION: NORMAL
NITRITE, URINE: NEGATIVE
PH UA: 6
PROTEIN UA: NEGATIVE MG/DL
SPECIFIC GRAVITY UA: 1.02
URINE TYPE: NORMAL
UROBILINOGEN, URINE: 0.2 E.U./DL

## 2019-01-16 PROCEDURE — 3017F COLORECTAL CA SCREEN DOC REV: CPT | Performed by: NURSE PRACTITIONER

## 2019-01-16 PROCEDURE — G8427 DOCREV CUR MEDS BY ELIG CLIN: HCPCS | Performed by: NURSE PRACTITIONER

## 2019-01-16 PROCEDURE — G8484 FLU IMMUNIZE NO ADMIN: HCPCS | Performed by: NURSE PRACTITIONER

## 2019-01-16 PROCEDURE — 4004F PT TOBACCO SCREEN RCVD TLK: CPT | Performed by: NURSE PRACTITIONER

## 2019-01-16 PROCEDURE — 99213 OFFICE O/P EST LOW 20 MIN: CPT | Performed by: NURSE PRACTITIONER

## 2019-01-16 PROCEDURE — G8420 CALC BMI NORM PARAMETERS: HCPCS | Performed by: NURSE PRACTITIONER

## 2019-01-16 RX ORDER — TADALAFIL 5 MG/1
5 TABLET ORAL PRN
Qty: 30 TABLET | Refills: 2 | Status: SHIPPED | OUTPATIENT
Start: 2019-01-16 | End: 2019-03-04 | Stop reason: SDUPTHER

## 2019-01-16 RX ORDER — OLMESARTAN MEDOXOMIL 40 MG/1
40 TABLET ORAL DAILY
Qty: 30 TABLET | Refills: 3 | Status: SHIPPED | OUTPATIENT
Start: 2019-01-16 | End: 2019-03-04 | Stop reason: SDUPTHER

## 2019-01-16 ASSESSMENT — ENCOUNTER SYMPTOMS: RESPIRATORY NEGATIVE: 1

## 2019-03-04 DIAGNOSIS — N52.9 ERECTILE DYSFUNCTION, UNSPECIFIED ERECTILE DYSFUNCTION TYPE: ICD-10-CM

## 2019-03-04 DIAGNOSIS — R09.89 CHEST CONGESTION: ICD-10-CM

## 2019-03-04 DIAGNOSIS — I10 ESSENTIAL HYPERTENSION: ICD-10-CM

## 2019-03-04 DIAGNOSIS — R05.9 COUGH: ICD-10-CM

## 2019-03-05 ENCOUNTER — OFFICE VISIT (OUTPATIENT)
Dept: ORTHOPEDIC SURGERY | Age: 59
End: 2019-03-05
Payer: COMMERCIAL

## 2019-03-05 VITALS
WEIGHT: 140 LBS | RESPIRATION RATE: 16 BRPM | HEIGHT: 65 IN | BODY MASS INDEX: 23.32 KG/M2 | DIASTOLIC BLOOD PRESSURE: 101 MMHG | SYSTOLIC BLOOD PRESSURE: 176 MMHG

## 2019-03-05 DIAGNOSIS — R22.32 MASS OF LEFT HAND: Primary | ICD-10-CM

## 2019-03-05 DIAGNOSIS — I10 ESSENTIAL HYPERTENSION: ICD-10-CM

## 2019-03-05 DIAGNOSIS — E55.9 VITAMIN D DEFICIENCY: ICD-10-CM

## 2019-03-05 PROCEDURE — G8420 CALC BMI NORM PARAMETERS: HCPCS | Performed by: ORTHOPAEDIC SURGERY

## 2019-03-05 PROCEDURE — 99203 OFFICE O/P NEW LOW 30 MIN: CPT | Performed by: ORTHOPAEDIC SURGERY

## 2019-03-05 PROCEDURE — 3017F COLORECTAL CA SCREEN DOC REV: CPT | Performed by: ORTHOPAEDIC SURGERY

## 2019-03-05 PROCEDURE — G8484 FLU IMMUNIZE NO ADMIN: HCPCS | Performed by: ORTHOPAEDIC SURGERY

## 2019-03-05 PROCEDURE — G8427 DOCREV CUR MEDS BY ELIG CLIN: HCPCS | Performed by: ORTHOPAEDIC SURGERY

## 2019-03-05 PROCEDURE — 4004F PT TOBACCO SCREEN RCVD TLK: CPT | Performed by: ORTHOPAEDIC SURGERY

## 2019-03-05 RX ORDER — GUAIFENESIN 600 MG/1
600 TABLET, EXTENDED RELEASE ORAL 2 TIMES DAILY
Qty: 60 TABLET | Refills: 2 | Status: SHIPPED | OUTPATIENT
Start: 2019-03-05 | End: 2019-03-21

## 2019-03-05 RX ORDER — OLMESARTAN MEDOXOMIL 40 MG/1
40 TABLET ORAL DAILY
Qty: 30 TABLET | Refills: 3 | Status: SHIPPED | OUTPATIENT
Start: 2019-03-05 | End: 2019-10-23

## 2019-03-05 RX ORDER — TADALAFIL 5 MG/1
5 TABLET ORAL PRN
Qty: 30 TABLET | Refills: 2 | Status: SHIPPED | OUTPATIENT
Start: 2019-03-05 | End: 2021-03-10 | Stop reason: SDUPTHER

## 2019-03-05 RX ORDER — NICOTINE 21 MG/24HR
1 PATCH, TRANSDERMAL 24 HOURS TRANSDERMAL DAILY
Qty: 10 PATCH | Refills: 0 | Status: SHIPPED | OUTPATIENT
Start: 2019-03-05 | End: 2019-09-10

## 2019-03-05 RX ORDER — CHOLECALCIFEROL (VITAMIN D3) 50 MCG
2000 TABLET ORAL DAILY
Qty: 30 TABLET | Refills: 5 | Status: SHIPPED | OUTPATIENT
Start: 2019-03-05 | End: 2019-07-18 | Stop reason: SDUPTHER

## 2019-03-05 RX ORDER — AMLODIPINE BESYLATE 10 MG/1
10 TABLET ORAL DAILY
Qty: 30 TABLET | Refills: 5 | Status: SHIPPED | OUTPATIENT
Start: 2019-03-05 | End: 2019-07-18 | Stop reason: SDUPTHER

## 2019-03-05 RX ORDER — LABETALOL 300 MG/1
300 TABLET, FILM COATED ORAL DAILY
Qty: 30 TABLET | Refills: 5 | Status: SHIPPED | OUTPATIENT
Start: 2019-03-05 | End: 2019-09-10

## 2019-03-07 ENCOUNTER — OFFICE VISIT (OUTPATIENT)
Dept: INTERNAL MEDICINE CLINIC | Age: 59
End: 2019-03-07
Payer: COMMERCIAL

## 2019-03-07 VITALS
TEMPERATURE: 97.9 F | SYSTOLIC BLOOD PRESSURE: 132 MMHG | DIASTOLIC BLOOD PRESSURE: 70 MMHG | HEART RATE: 72 BPM | WEIGHT: 148.6 LBS | BODY MASS INDEX: 23.88 KG/M2 | HEIGHT: 66 IN

## 2019-03-07 DIAGNOSIS — F17.210 CIGARETTE NICOTINE DEPENDENCE WITHOUT COMPLICATION: ICD-10-CM

## 2019-03-07 DIAGNOSIS — Z00.00 HEALTHCARE MAINTENANCE: ICD-10-CM

## 2019-03-07 DIAGNOSIS — R09.89 CHEST CONGESTION: ICD-10-CM

## 2019-03-07 DIAGNOSIS — E55.9 VITAMIN D DEFICIENCY: ICD-10-CM

## 2019-03-07 DIAGNOSIS — J43.9 EMPHYSEMATOUS BLEB (HCC): ICD-10-CM

## 2019-03-07 DIAGNOSIS — Z12.11 SCREENING FOR COLON CANCER: ICD-10-CM

## 2019-03-07 DIAGNOSIS — I10 ESSENTIAL HYPERTENSION: ICD-10-CM

## 2019-03-07 DIAGNOSIS — Z01.818 PRE-OP EXAM: ICD-10-CM

## 2019-03-07 DIAGNOSIS — Z01.818 PRE-OP EXAM: Primary | ICD-10-CM

## 2019-03-07 LAB
ALBUMIN SERPL-MCNC: 4.5 G/DL (ref 3.4–5)
ANION GAP SERPL CALCULATED.3IONS-SCNC: 13 MMOL/L (ref 3–16)
BUN BLDV-MCNC: 13 MG/DL (ref 7–20)
CALCIUM SERPL-MCNC: 9.7 MG/DL (ref 8.3–10.6)
CHLORIDE BLD-SCNC: 107 MMOL/L (ref 99–110)
CO2: 25 MMOL/L (ref 21–32)
CREAT SERPL-MCNC: 0.9 MG/DL (ref 0.9–1.3)
GFR AFRICAN AMERICAN: >60
GFR NON-AFRICAN AMERICAN: >60
GLUCOSE BLD-MCNC: 90 MG/DL (ref 70–99)
HCT VFR BLD CALC: 41.3 % (ref 40.5–52.5)
HEMOGLOBIN: 13 G/DL (ref 13.5–17.5)
MCH RBC QN AUTO: 29 PG (ref 26–34)
MCHC RBC AUTO-ENTMCNC: 31.6 G/DL (ref 31–36)
MCV RBC AUTO: 91.7 FL (ref 80–100)
PDW BLD-RTO: 15.2 % (ref 12.4–15.4)
PHOSPHORUS: 3.1 MG/DL (ref 2.5–4.9)
PLATELET # BLD: 180 K/UL (ref 135–450)
PMV BLD AUTO: 10.8 FL (ref 5–10.5)
POTASSIUM SERPL-SCNC: 4.9 MMOL/L (ref 3.5–5.1)
RBC # BLD: 4.5 M/UL (ref 4.2–5.9)
SODIUM BLD-SCNC: 145 MMOL/L (ref 136–145)
VITAMIN D 25-HYDROXY: 39 NG/ML
WBC # BLD: 4.3 K/UL (ref 4–11)

## 2019-03-07 PROCEDURE — 93000 ELECTROCARDIOGRAM COMPLETE: CPT | Performed by: NURSE PRACTITIONER

## 2019-03-07 PROCEDURE — G8926 SPIRO NO PERF OR DOC: HCPCS | Performed by: NURSE PRACTITIONER

## 2019-03-07 PROCEDURE — G8420 CALC BMI NORM PARAMETERS: HCPCS | Performed by: NURSE PRACTITIONER

## 2019-03-07 PROCEDURE — 3023F SPIROM DOC REV: CPT | Performed by: NURSE PRACTITIONER

## 2019-03-07 PROCEDURE — 3017F COLORECTAL CA SCREEN DOC REV: CPT | Performed by: NURSE PRACTITIONER

## 2019-03-07 PROCEDURE — 99243 OFF/OP CNSLTJ NEW/EST LOW 30: CPT | Performed by: NURSE PRACTITIONER

## 2019-03-07 PROCEDURE — G8484 FLU IMMUNIZE NO ADMIN: HCPCS | Performed by: NURSE PRACTITIONER

## 2019-03-07 PROCEDURE — G8427 DOCREV CUR MEDS BY ELIG CLIN: HCPCS | Performed by: NURSE PRACTITIONER

## 2019-03-07 RX ORDER — CETIRIZINE HYDROCHLORIDE, PSEUDOEPHEDRINE HYDROCHLORIDE 5; 120 MG/1; MG/1
1 TABLET, FILM COATED, EXTENDED RELEASE ORAL 2 TIMES DAILY
Qty: 60 TABLET | Refills: 0 | Status: SHIPPED | OUTPATIENT
Start: 2019-03-07 | End: 2019-03-21

## 2019-03-07 ASSESSMENT — PATIENT HEALTH QUESTIONNAIRE - PHQ9
2. FEELING DOWN, DEPRESSED OR HOPELESS: 0
SUM OF ALL RESPONSES TO PHQ QUESTIONS 1-9: 0
1. LITTLE INTEREST OR PLEASURE IN DOING THINGS: 0
SUM OF ALL RESPONSES TO PHQ9 QUESTIONS 1 & 2: 0
SUM OF ALL RESPONSES TO PHQ QUESTIONS 1-9: 0

## 2019-03-07 ASSESSMENT — ENCOUNTER SYMPTOMS: SHORTNESS OF BREATH: 0

## 2019-03-21 RX ORDER — ACETAMINOPHEN 325 MG/1
650 TABLET ORAL PRN
COMMUNITY
Start: 2016-09-14

## 2019-03-27 ENCOUNTER — ANESTHESIA EVENT (OUTPATIENT)
Dept: OPERATING ROOM | Age: 59
End: 2019-03-27
Payer: COMMERCIAL

## 2019-03-28 ENCOUNTER — HOSPITAL ENCOUNTER (OUTPATIENT)
Age: 59
Setting detail: OUTPATIENT SURGERY
Discharge: HOME | End: 2019-03-28
Attending: ORTHOPAEDIC SURGERY | Admitting: ORTHOPAEDIC SURGERY
Payer: COMMERCIAL

## 2019-03-28 ENCOUNTER — ANESTHESIA (OUTPATIENT)
Dept: OPERATING ROOM | Age: 59
End: 2019-03-28
Payer: COMMERCIAL

## 2019-03-28 VITALS
RESPIRATION RATE: 16 BRPM | SYSTOLIC BLOOD PRESSURE: 168 MMHG | HEIGHT: 66 IN | BODY MASS INDEX: 24.55 KG/M2 | HEART RATE: 57 BPM | WEIGHT: 152.78 LBS | DIASTOLIC BLOOD PRESSURE: 94 MMHG | OXYGEN SATURATION: 94 % | TEMPERATURE: 97.7 F

## 2019-03-28 VITALS
DIASTOLIC BLOOD PRESSURE: 59 MMHG | SYSTOLIC BLOOD PRESSURE: 99 MMHG | OXYGEN SATURATION: 100 % | TEMPERATURE: 98.6 F | RESPIRATION RATE: 1 BRPM

## 2019-03-28 PROCEDURE — 3600000005 HC SURGERY LEVEL 5 BASE: Performed by: ORTHOPAEDIC SURGERY

## 2019-03-28 PROCEDURE — 7100000000 HC PACU RECOVERY - FIRST 15 MIN: Performed by: ORTHOPAEDIC SURGERY

## 2019-03-28 PROCEDURE — 7100000011 HC PHASE II RECOVERY - ADDTL 15 MIN: Performed by: ORTHOPAEDIC SURGERY

## 2019-03-28 PROCEDURE — 6370000000 HC RX 637 (ALT 250 FOR IP): Performed by: ANESTHESIOLOGY

## 2019-03-28 PROCEDURE — 6360000002 HC RX W HCPCS: Performed by: NURSE ANESTHETIST, CERTIFIED REGISTERED

## 2019-03-28 PROCEDURE — 2580000003 HC RX 258: Performed by: ANESTHESIOLOGY

## 2019-03-28 PROCEDURE — 88304 TISSUE EXAM BY PATHOLOGIST: CPT

## 2019-03-28 PROCEDURE — 2500000003 HC RX 250 WO HCPCS: Performed by: ORTHOPAEDIC SURGERY

## 2019-03-28 PROCEDURE — 7100000010 HC PHASE II RECOVERY - FIRST 15 MIN: Performed by: ORTHOPAEDIC SURGERY

## 2019-03-28 PROCEDURE — 2500000003 HC RX 250 WO HCPCS: Performed by: NURSE ANESTHETIST, CERTIFIED REGISTERED

## 2019-03-28 PROCEDURE — 3600000015 HC SURGERY LEVEL 5 ADDTL 15MIN: Performed by: ORTHOPAEDIC SURGERY

## 2019-03-28 PROCEDURE — 7100000001 HC PACU RECOVERY - ADDTL 15 MIN: Performed by: ORTHOPAEDIC SURGERY

## 2019-03-28 PROCEDURE — 3700000000 HC ANESTHESIA ATTENDED CARE: Performed by: ORTHOPAEDIC SURGERY

## 2019-03-28 PROCEDURE — 3700000001 HC ADD 15 MINUTES (ANESTHESIA): Performed by: ORTHOPAEDIC SURGERY

## 2019-03-28 PROCEDURE — 2709999900 HC NON-CHARGEABLE SUPPLY: Performed by: ORTHOPAEDIC SURGERY

## 2019-03-28 RX ORDER — DEXAMETHASONE SODIUM PHOSPHATE 4 MG/ML
INJECTION, SOLUTION INTRA-ARTICULAR; INTRALESIONAL; INTRAMUSCULAR; INTRAVENOUS; SOFT TISSUE PRN
Status: DISCONTINUED | OUTPATIENT
Start: 2019-03-28 | End: 2019-03-28 | Stop reason: SDUPTHER

## 2019-03-28 RX ORDER — SODIUM CHLORIDE 0.9 % (FLUSH) 0.9 %
10 SYRINGE (ML) INJECTION EVERY 12 HOURS SCHEDULED
Status: DISCONTINUED | OUTPATIENT
Start: 2019-03-28 | End: 2019-03-28 | Stop reason: HOSPADM

## 2019-03-28 RX ORDER — PROPOFOL 10 MG/ML
INJECTION, EMULSION INTRAVENOUS PRN
Status: DISCONTINUED | OUTPATIENT
Start: 2019-03-28 | End: 2019-03-28 | Stop reason: SDUPTHER

## 2019-03-28 RX ORDER — FENTANYL CITRATE 50 UG/ML
INJECTION, SOLUTION INTRAMUSCULAR; INTRAVENOUS PRN
Status: DISCONTINUED | OUTPATIENT
Start: 2019-03-28 | End: 2019-03-28 | Stop reason: SDUPTHER

## 2019-03-28 RX ORDER — ACETAMINOPHEN 325 MG/1
650 TABLET ORAL ONCE
Status: COMPLETED | OUTPATIENT
Start: 2019-03-28 | End: 2019-03-28

## 2019-03-28 RX ORDER — LIDOCAINE HYDROCHLORIDE 20 MG/ML
INJECTION, SOLUTION EPIDURAL; INFILTRATION; INTRACAUDAL; PERINEURAL PRN
Status: DISCONTINUED | OUTPATIENT
Start: 2019-03-28 | End: 2019-03-28 | Stop reason: SDUPTHER

## 2019-03-28 RX ORDER — MIDAZOLAM HYDROCHLORIDE 1 MG/ML
INJECTION INTRAMUSCULAR; INTRAVENOUS PRN
Status: DISCONTINUED | OUTPATIENT
Start: 2019-03-28 | End: 2019-03-28 | Stop reason: SDUPTHER

## 2019-03-28 RX ORDER — SODIUM CHLORIDE 9 MG/ML
INJECTION, SOLUTION INTRAVENOUS CONTINUOUS
Status: DISCONTINUED | OUTPATIENT
Start: 2019-03-28 | End: 2019-03-28 | Stop reason: HOSPADM

## 2019-03-28 RX ORDER — BUPIVACAINE HYDROCHLORIDE 5 MG/ML
INJECTION, SOLUTION EPIDURAL; INTRACAUDAL
Status: COMPLETED | OUTPATIENT
Start: 2019-03-28 | End: 2019-03-28

## 2019-03-28 RX ORDER — ONDANSETRON 2 MG/ML
INJECTION INTRAMUSCULAR; INTRAVENOUS PRN
Status: DISCONTINUED | OUTPATIENT
Start: 2019-03-28 | End: 2019-03-28 | Stop reason: SDUPTHER

## 2019-03-28 RX ORDER — SODIUM CHLORIDE 0.9 % (FLUSH) 0.9 %
10 SYRINGE (ML) INJECTION PRN
Status: DISCONTINUED | OUTPATIENT
Start: 2019-03-28 | End: 2019-03-28 | Stop reason: HOSPADM

## 2019-03-28 RX ADMIN — PROPOFOL 175 MG: 10 INJECTION, EMULSION INTRAVENOUS at 13:42

## 2019-03-28 RX ADMIN — DEXAMETHASONE SODIUM PHOSPHATE 4 MG: 4 INJECTION, SOLUTION INTRAMUSCULAR; INTRAVENOUS at 13:53

## 2019-03-28 RX ADMIN — FENTANYL CITRATE 50 MCG: 50 INJECTION INTRAMUSCULAR; INTRAVENOUS at 13:56

## 2019-03-28 RX ADMIN — ACETAMINOPHEN 650 MG: 325 TABLET, FILM COATED ORAL at 15:40

## 2019-03-28 RX ADMIN — ONDANSETRON 4 MG: 2 INJECTION INTRAMUSCULAR; INTRAVENOUS at 13:53

## 2019-03-28 RX ADMIN — SODIUM CHLORIDE: 9 INJECTION, SOLUTION INTRAVENOUS at 12:13

## 2019-03-28 RX ADMIN — MIDAZOLAM 2 MG: 1 INJECTION INTRAMUSCULAR; INTRAVENOUS at 13:40

## 2019-03-28 RX ADMIN — FENTANYL CITRATE 50 MCG: 50 INJECTION INTRAMUSCULAR; INTRAVENOUS at 13:49

## 2019-03-28 RX ADMIN — LIDOCAINE HYDROCHLORIDE 100 MG: 20 INJECTION, SOLUTION EPIDURAL; INFILTRATION; INTRACAUDAL; PERINEURAL at 13:42

## 2019-03-28 ASSESSMENT — PULMONARY FUNCTION TESTS
PIF_VALUE: 20
PIF_VALUE: 3
PIF_VALUE: 3
PIF_VALUE: 20
PIF_VALUE: 10
PIF_VALUE: 11
PIF_VALUE: 0
PIF_VALUE: 2
PIF_VALUE: 2
PIF_VALUE: 3
PIF_VALUE: 1
PIF_VALUE: 11
PIF_VALUE: 5
PIF_VALUE: 2
PIF_VALUE: 3
PIF_VALUE: 1
PIF_VALUE: 11
PIF_VALUE: 22
PIF_VALUE: 11
PIF_VALUE: 11
PIF_VALUE: 22
PIF_VALUE: 3
PIF_VALUE: 11
PIF_VALUE: 1
PIF_VALUE: 1
PIF_VALUE: 11
PIF_VALUE: 2
PIF_VALUE: 0

## 2019-03-28 ASSESSMENT — PAIN DESCRIPTION - FREQUENCY
FREQUENCY: CONTINUOUS

## 2019-03-28 ASSESSMENT — PAIN DESCRIPTION - ONSET
ONSET: ON-GOING
ONSET: AWAKENED FROM SLEEP

## 2019-03-28 ASSESSMENT — PAIN DESCRIPTION - PROGRESSION
CLINICAL_PROGRESSION: NOT CHANGED
CLINICAL_PROGRESSION: GRADUALLY WORSENING
CLINICAL_PROGRESSION: GRADUALLY IMPROVING
CLINICAL_PROGRESSION: GRADUALLY WORSENING
CLINICAL_PROGRESSION: NOT CHANGED

## 2019-03-28 ASSESSMENT — PAIN DESCRIPTION - PAIN TYPE
TYPE: SURGICAL PAIN

## 2019-03-28 ASSESSMENT — PAIN DESCRIPTION - LOCATION
LOCATION: HAND

## 2019-03-28 ASSESSMENT — PAIN DESCRIPTION - DESCRIPTORS
DESCRIPTORS: NUMBNESS;DISCOMFORT
DESCRIPTORS: NUMBNESS
DESCRIPTORS: DISCOMFORT
DESCRIPTORS: DISCOMFORT;NUMBNESS
DESCRIPTORS: DISCOMFORT

## 2019-03-28 ASSESSMENT — ENCOUNTER SYMPTOMS: SHORTNESS OF BREATH: 0

## 2019-03-28 ASSESSMENT — PAIN DESCRIPTION - ORIENTATION
ORIENTATION: LEFT
ORIENTATION: LEFT;INNER
ORIENTATION: LEFT;INNER
ORIENTATION: LEFT
ORIENTATION: LEFT;INNER

## 2019-03-28 ASSESSMENT — PAIN SCALES - GENERAL
PAINLEVEL_OUTOF10: 8
PAINLEVEL_OUTOF10: 2
PAINLEVEL_OUTOF10: 0
PAINLEVEL_OUTOF10: 8
PAINLEVEL_OUTOF10: 6
PAINLEVEL_OUTOF10: 2

## 2019-03-28 ASSESSMENT — PAIN - FUNCTIONAL ASSESSMENT
PAIN_FUNCTIONAL_ASSESSMENT: ACTIVITIES ARE NOT PREVENTED
PAIN_FUNCTIONAL_ASSESSMENT: ACTIVITIES ARE NOT PREVENTED
PAIN_FUNCTIONAL_ASSESSMENT: 0-10

## 2019-04-05 ENCOUNTER — OFFICE VISIT (OUTPATIENT)
Dept: ORTHOPEDIC SURGERY | Age: 59
End: 2019-04-05

## 2019-04-05 VITALS — RESPIRATION RATE: 16 BRPM | WEIGHT: 148 LBS | HEIGHT: 66 IN | BODY MASS INDEX: 23.78 KG/M2

## 2019-04-05 DIAGNOSIS — R22.32 MASS OF LEFT HAND: Primary | ICD-10-CM

## 2019-04-05 PROCEDURE — 99024 POSTOP FOLLOW-UP VISIT: CPT | Performed by: ORTHOPAEDIC SURGERY

## 2019-04-05 PROCEDURE — APPSS15 APP SPLIT SHARED TIME 0-15 MINUTES: Performed by: PHYSICIAN ASSISTANT

## 2019-04-05 NOTE — PATIENT INSTRUCTIONS
Postoperative Instructions After Cyst or Mass Removal    Dr. Jesus Luu. Omar        1. After bandages are removed one week from surgery, you may chose to wear a small bandage over the incision if you wish, though you do not need to. 2. Keep incision dry until sutures are removed or it has been 14 days since your surgery. Thereafter, you may wash with mild soap and water and shower normally. 3. IF YOU HAVE DISSOLVABLE SUTURES:  Once your stiches have fully disappeared, you should begin gently massaging the incision with Vitamin E (may use Vitamin E lotion or contents of Vitamin E capsule). IF YOU HAVE NON-DISSOLVABLE SUTURES (Black plastic): Keep stitches dry. Do Not apply any ointment or lotion to incision site. Schedule appointment for 14 or more days after the date of your surgery for suture removal visit. After your stitches are removed, you should begin gently massaging the incision with Vitamin E (may use Vitamin E lotion or contents of Vitamin E capsule). 4. Work hard on motion of the fingers and wrist, straightening each finger fully and bending each finger fully, bending wrist forward and bending wrist backwards. Do not be concerned if you experience discomfort. This will not damage the surgery. 5. You may begin using the hand as it feels comfortable beginning 12-14 days from the day of surgery. You may not feel entirely comfortable gripping or lifting heavy objects for several weeks. 6. You may expect to see some skin peel off around the incision. You may be left with a small area of pink baby skin. This is quite normal.    Thank you for choosing [facility]) Physicians for your Hand and Upper Extremity needs. If we can be of any further assistance to you, please do not hesitate to contact us.     Office Phone Number:  [phone]  or  [phone]

## 2019-04-05 NOTE — PROGRESS NOTES
Mr. Dank Arredondo returns today in follow-up of his recent left Volar hand Mass Excision done approximately 1 week ago. He has done well noting mild discomfort and no other reported complications. He notes pre-operative symptoms to be Improved at this time. Physical Exam:  Skin incision is healing well, no significant drainage, no dehiscence. Digital range of motion is full and equal bilateral.  Wrist shows full and equal bilateral range of motion. Sensation is normal in the Whole Hand. Vascular examination reveals normal, good capillary refill and good color. Swelling is minimal.  There is no residual discomfort at the surgical site, no evidence for recurance of the mass. Impression:  Mr. Dank Arredondo is doing well after recent left  Volar hand Mass Excision. Plan:  Mr. Dank Arredondo is instructed in work on Active & Passive range of motion of the digits, wrist, & elbow. These modalities were specifically demonstrated to him today. We discussed the appropriateness of gradual resumption of use of the operated hand and the return to normal use as comfort allows. He is given instructions regarding management of the fresh surgical incision and progressive use of desensitization and tissue massage techniques. We discussed the appropriate expectations and timeline for symptom improvement including the potential for some longer term residual swelling or fullness at the surgical site. I have reviewed the surgical pathology report with him today. He is provided a written patient instruction sheet titled: Postoperative Instructions After Mass Excision. I have asked Mr. Dank Arredondo to follow-up with me, either by scheduling an appointment for approximately 2-4 weeks from now, or by contacting me by telephone over the next 2-4 weeks if his symptoms have not fully resolved or if he has not regained full & painless return of function.       He is also specifically instructed to return to the office or call for an appointment sooner if his symptoms are changing or worsening prior to that time.

## 2019-07-18 DIAGNOSIS — E55.9 VITAMIN D DEFICIENCY: ICD-10-CM

## 2019-07-18 DIAGNOSIS — I10 ESSENTIAL HYPERTENSION: ICD-10-CM

## 2019-07-18 RX ORDER — CHOLECALCIFEROL (VITAMIN D3) 50 MCG
2000 TABLET ORAL DAILY
Qty: 30 TABLET | Refills: 5 | Status: SHIPPED | OUTPATIENT
Start: 2019-07-18 | End: 2019-10-23 | Stop reason: SDUPTHER

## 2019-07-18 RX ORDER — AMLODIPINE BESYLATE 10 MG/1
10 TABLET ORAL DAILY
Qty: 30 TABLET | Refills: 5 | Status: SHIPPED | OUTPATIENT
Start: 2019-07-18 | End: 2020-02-18 | Stop reason: SDUPTHER

## 2019-09-10 ENCOUNTER — OFFICE VISIT (OUTPATIENT)
Dept: FAMILY MEDICINE CLINIC | Age: 59
End: 2019-09-10
Payer: COMMERCIAL

## 2019-09-10 VITALS
DIASTOLIC BLOOD PRESSURE: 92 MMHG | OXYGEN SATURATION: 97 % | SYSTOLIC BLOOD PRESSURE: 150 MMHG | WEIGHT: 142.8 LBS | HEART RATE: 66 BPM | BODY MASS INDEX: 23.05 KG/M2

## 2019-09-10 DIAGNOSIS — Z00.00 ENCOUNTER FOR MEDICAL EXAMINATION TO ESTABLISH CARE: Primary | ICD-10-CM

## 2019-09-10 DIAGNOSIS — F17.210 CIGARETTE NICOTINE DEPENDENCE WITHOUT COMPLICATION: ICD-10-CM

## 2019-09-10 DIAGNOSIS — I10 ESSENTIAL HYPERTENSION: ICD-10-CM

## 2019-09-10 PROBLEM — J43.9 EMPHYSEMA LUNG (HCC): Status: RESOLVED | Noted: 2017-09-28 | Resolved: 2019-09-10

## 2019-09-10 PROCEDURE — 99204 OFFICE O/P NEW MOD 45 MIN: CPT | Performed by: FAMILY MEDICINE

## 2019-09-10 RX ORDER — VARENICLINE TARTRATE 1 MG/1
1 TABLET, FILM COATED ORAL 2 TIMES DAILY
Qty: 180 TABLET | Refills: 1 | Status: SHIPPED | OUTPATIENT
Start: 2019-09-10 | End: 2021-02-03 | Stop reason: SDUPTHER

## 2019-09-10 SDOH — HEALTH STABILITY: PHYSICAL HEALTH: ON AVERAGE, HOW MANY MINUTES DO YOU ENGAGE IN EXERCISE AT THIS LEVEL?: 150+ MIN

## 2019-09-10 SDOH — SOCIAL STABILITY: SOCIAL INSECURITY
WITHIN THE LAST YEAR, HAVE TO BEEN RAPED OR FORCED TO HAVE ANY KIND OF SEXUAL ACTIVITY BY YOUR PARTNER OR EX-PARTNER?: NO

## 2019-09-10 SDOH — SOCIAL STABILITY: SOCIAL NETWORK
IN A TYPICAL WEEK, HOW MANY TIMES DO YOU TALK ON THE PHONE WITH FAMILY, FRIENDS, OR NEIGHBORS?: MORE THAN THREE TIMES A WEEK

## 2019-09-10 SDOH — SOCIAL STABILITY: SOCIAL NETWORK
DO YOU BELONG TO ANY CLUBS OR ORGANIZATIONS SUCH AS CHURCH GROUPS UNIONS, FRATERNAL OR ATHLETIC GROUPS, OR SCHOOL GROUPS?: YES

## 2019-09-10 SDOH — SOCIAL STABILITY: SOCIAL NETWORK: HOW OFTEN DO YOU ATTEND CHURCH OR RELIGIOUS SERVICES?: MORE THAN 4 TIMES PER YEAR

## 2019-09-10 SDOH — SOCIAL STABILITY: SOCIAL INSECURITY: WITHIN THE LAST YEAR, HAVE YOU BEEN AFRAID OF YOUR PARTNER OR EX-PARTNER?: NO

## 2019-09-10 SDOH — HEALTH STABILITY: MENTAL HEALTH
STRESS IS WHEN SOMEONE FEELS TENSE, NERVOUS, ANXIOUS, OR CAN'T SLEEP AT NIGHT BECAUSE THEIR MIND IS TROUBLED. HOW STRESSED ARE YOU?: NOT AT ALL

## 2019-09-10 SDOH — SOCIAL STABILITY: SOCIAL NETWORK: ARE YOU MARRIED, WIDOWED, DIVORCED, SEPARATED, NEVER MARRIED, OR LIVING WITH A PARTNER?: DIVORCED

## 2019-09-10 SDOH — SOCIAL STABILITY: SOCIAL NETWORK: HOW OFTEN DO YOU ATTENT MEETINGS OF THE CLUB OR ORGANIZATION YOU BELONG TO?: MORE THAN 4 TIMES PER YEAR

## 2019-09-10 SDOH — SOCIAL STABILITY: SOCIAL NETWORK: HOW OFTEN DO YOU GET TOGETHER WITH FRIENDS OR RELATIVES?: ONCE A WEEK

## 2019-09-10 SDOH — SOCIAL STABILITY: SOCIAL INSECURITY
WITHIN THE LAST YEAR, HAVE YOU BEEN KICKED, HIT, SLAPPED, OR OTHERWISE PHYSICALLY HURT BY YOUR PARTNER OR EX-PARTNER?: NO

## 2019-09-10 SDOH — HEALTH STABILITY: PHYSICAL HEALTH: ON AVERAGE, HOW MANY DAYS PER WEEK DO YOU ENGAGE IN MODERATE TO STRENUOUS EXERCISE (LIKE A BRISK WALK)?: 7 DAYS

## 2019-09-10 SDOH — SOCIAL STABILITY: SOCIAL INSECURITY: WITHIN THE LAST YEAR, HAVE YOU BEEN HUMILIATED OR EMOTIONALLY ABUSED IN OTHER WAYS BY YOUR PARTNER OR EX-PARTNER?: NO

## 2019-09-10 ASSESSMENT — ENCOUNTER SYMPTOMS
BACK PAIN: 0
CHEST TIGHTNESS: 0
SORE THROAT: 0
RHINORRHEA: 0
VOMITING: 0
DIARRHEA: 0
TROUBLE SWALLOWING: 0
BLOOD IN STOOL: 0
COUGH: 0
ABDOMINAL PAIN: 0
NAUSEA: 0
PHOTOPHOBIA: 0
SHORTNESS OF BREATH: 0
CONSTIPATION: 0

## 2019-09-10 NOTE — PROGRESS NOTES
nervous/anxious. Vitals:    09/10/19 1331   BP: (!) 150/92   Site: Left Upper Arm   Position: Sitting   Pulse: 66   SpO2: 97%   Weight: 142 lb 12.8 oz (64.8 kg)       Physical Exam   Constitutional: He is oriented to person, place, and time. He appears well-developed and well-nourished. No distress. HENT:   Head: Normocephalic and atraumatic. Right Ear: Hearing, tympanic membrane, external ear and ear canal normal.   Left Ear: Hearing, tympanic membrane, external ear and ear canal normal.   Mouth/Throat: Oropharynx is clear and moist. No oropharyngeal exudate. Eyes: Pupils are equal, round, and reactive to light. Conjunctivae and EOM are normal.   Neck: Normal range of motion. Neck supple. No thyromegaly present. Cardiovascular: Normal rate, regular rhythm, normal heart sounds and intact distal pulses. Exam reveals no gallop and no friction rub. No murmur heard. Pulmonary/Chest: Effort normal and breath sounds normal. He has no wheezes. Abdominal: Soft. Bowel sounds are normal. He exhibits no distension and no mass. There is no tenderness. Musculoskeletal: Normal range of motion. He exhibits no edema. Lymphadenopathy:     He has no cervical adenopathy. Neurological: He is alert and oriented to person, place, and time. Skin: Skin is warm and dry. Capillary refill takes less than 2 seconds. No rash noted. Vitals reviewed. Assessment:  Encounter Diagnoses   Name Primary?  Encounter for medical examination to establish care Yes    Essential hypertension     Cigarette nicotine dependence without complication        Plan:  1. Encounter for medical examination to establish care  - reviewed PMHX, family history, social history, medications, allergies    2. Essential hypertension  - on amlodipine -- ? Benicar, labetolol -- continue only amlodipine for now and monitor BP at home  - if able to quit smoking will likely improve    3.  Cigarette nicotine dependence without

## 2019-10-23 ENCOUNTER — OFFICE VISIT (OUTPATIENT)
Dept: FAMILY MEDICINE CLINIC | Age: 59
End: 2019-10-23
Payer: COMMERCIAL

## 2019-10-23 VITALS
HEART RATE: 55 BPM | DIASTOLIC BLOOD PRESSURE: 94 MMHG | WEIGHT: 145 LBS | OXYGEN SATURATION: 98 % | BODY MASS INDEX: 23.4 KG/M2 | SYSTOLIC BLOOD PRESSURE: 148 MMHG

## 2019-10-23 DIAGNOSIS — E55.9 VITAMIN D DEFICIENCY: ICD-10-CM

## 2019-10-23 DIAGNOSIS — Z12.11 COLON CANCER SCREENING: ICD-10-CM

## 2019-10-23 DIAGNOSIS — I10 ESSENTIAL HYPERTENSION: Primary | ICD-10-CM

## 2019-10-23 PROCEDURE — 99213 OFFICE O/P EST LOW 20 MIN: CPT | Performed by: FAMILY MEDICINE

## 2019-10-23 RX ORDER — ACETAMINOPHEN 160 MG
TABLET,DISINTEGRATING ORAL
COMMUNITY
End: 2019-10-23

## 2019-10-23 RX ORDER — CHOLECALCIFEROL (VITAMIN D3) 50 MCG
2000 TABLET ORAL DAILY
Qty: 30 TABLET | Refills: 5 | Status: SHIPPED | OUTPATIENT
Start: 2019-10-23 | End: 2020-02-18 | Stop reason: SDUPTHER

## 2019-10-23 ASSESSMENT — ENCOUNTER SYMPTOMS
CHEST TIGHTNESS: 0
BLURRED VISION: 0
SHORTNESS OF BREATH: 0
ORTHOPNEA: 0
PHOTOPHOBIA: 0
WHEEZING: 0
COUGH: 0
EYE PAIN: 0

## 2020-01-13 ENCOUNTER — OFFICE VISIT (OUTPATIENT)
Dept: FAMILY MEDICINE CLINIC | Age: 60
End: 2020-01-13
Payer: COMMERCIAL

## 2020-01-13 VITALS
DIASTOLIC BLOOD PRESSURE: 112 MMHG | SYSTOLIC BLOOD PRESSURE: 190 MMHG | WEIGHT: 146 LBS | BODY MASS INDEX: 23.57 KG/M2 | HEART RATE: 59 BPM | OXYGEN SATURATION: 98 %

## 2020-01-13 PROCEDURE — G8420 CALC BMI NORM PARAMETERS: HCPCS | Performed by: FAMILY MEDICINE

## 2020-01-13 PROCEDURE — G8427 DOCREV CUR MEDS BY ELIG CLIN: HCPCS | Performed by: FAMILY MEDICINE

## 2020-01-13 PROCEDURE — 99213 OFFICE O/P EST LOW 20 MIN: CPT | Performed by: FAMILY MEDICINE

## 2020-01-13 PROCEDURE — G8484 FLU IMMUNIZE NO ADMIN: HCPCS | Performed by: FAMILY MEDICINE

## 2020-01-13 PROCEDURE — 4004F PT TOBACCO SCREEN RCVD TLK: CPT | Performed by: FAMILY MEDICINE

## 2020-01-13 PROCEDURE — 3017F COLORECTAL CA SCREEN DOC REV: CPT | Performed by: FAMILY MEDICINE

## 2020-01-13 RX ORDER — GUAIFENESIN 600 MG/1
1200 TABLET, EXTENDED RELEASE ORAL 2 TIMES DAILY
COMMUNITY
End: 2020-01-13 | Stop reason: SDUPTHER

## 2020-01-13 RX ORDER — GUAIFENESIN 600 MG/1
1200 TABLET, EXTENDED RELEASE ORAL 2 TIMES DAILY
Qty: 60 TABLET | Refills: 1 | Status: SHIPPED | OUTPATIENT
Start: 2020-01-13 | End: 2021-02-03

## 2020-01-13 RX ORDER — HYDROCHLOROTHIAZIDE 25 MG/1
25 TABLET ORAL EVERY MORNING
Qty: 30 TABLET | Refills: 1 | Status: SHIPPED | OUTPATIENT
Start: 2020-01-13 | End: 2020-02-18 | Stop reason: SDUPTHER

## 2020-01-13 RX ORDER — LABETALOL 300 MG/1
1 TABLET, FILM COATED ORAL DAILY
COMMUNITY
Start: 2019-12-16 | End: 2020-02-18 | Stop reason: SDUPTHER

## 2020-01-13 ASSESSMENT — ENCOUNTER SYMPTOMS
CONSTIPATION: 0
WHEEZING: 0
SORE THROAT: 0
BACK PAIN: 0
BLURRED VISION: 0
SHORTNESS OF BREATH: 0
CHEST TIGHTNESS: 0
ABDOMINAL PAIN: 0
PHOTOPHOBIA: 0
DIARRHEA: 0
TROUBLE SWALLOWING: 0
COUGH: 1
VOMITING: 0
NAUSEA: 0
ORTHOPNEA: 0
RHINORRHEA: 0
BLOOD IN STOOL: 0

## 2020-01-13 ASSESSMENT — PATIENT HEALTH QUESTIONNAIRE - PHQ9
SUM OF ALL RESPONSES TO PHQ QUESTIONS 1-9: 0
1. LITTLE INTEREST OR PLEASURE IN DOING THINGS: 0
SUM OF ALL RESPONSES TO PHQ9 QUESTIONS 1 & 2: 0
2. FEELING DOWN, DEPRESSED OR HOPELESS: 0
SUM OF ALL RESPONSES TO PHQ QUESTIONS 1-9: 0

## 2020-01-13 NOTE — PROGRESS NOTES
[facility]  Office Visit      Patient: Angelique Mathew is a 64 y.o. male who presents today with the following Chief Complaint(s):  Chief Complaint   Patient presents with    Follow-up         Hypertension   This is a chronic problem. The current episode started more than 1 year ago. The problem is unchanged. The problem is uncontrolled. Pertinent negatives include no anxiety, blurred vision, chest pain, headaches, malaise/fatigue, neck pain, orthopnea, palpitations, peripheral edema, PND, shortness of breath or sweats. There are no associated agents to hypertension. Risk factors for coronary artery disease include smoking/tobacco exposure and male gender. Past treatments include calcium channel blockers and beta blockers (currently on amlodipine and labetolol). The current treatment provides mild improvement. Compliance problems: good compliance with amlodipine -- he seems somewhat unsure about his other medicaiton. Hypertensive end-organ damage includes CVA (prior cerebellar hemorrhage). There is no history of kidney disease. Current Outpatient Medications   Medication Sig Dispense Refill    guaiFENesin (MUCUS RELIEF) 600 MG extended release tablet Take 2 tablets by mouth 2 times daily 60 tablet 1    hydrochlorothiazide (HYDRODIURIL) 25 MG tablet Take 1 tablet by mouth every morning 30 tablet 1    Cholecalciferol (VITAMIN D) 50 MCG (2000 UT) TABS tablet Take 1 tablet by mouth daily 30 tablet 5    aspirin 81 MG tablet Take 1 tablet by mouth daily 30 tablet 3    varenicline (CHANTIX) 1 MG tablet Take 1 tablet by mouth 2 times daily 180 tablet 1    amLODIPine (NORVASC) 10 MG tablet Take 1 tablet by mouth daily 30 tablet 5    acetaminophen (TYLENOL) 325 MG tablet Take 650 mg by mouth as needed      tadalafil (CIALIS) 5 MG tablet Take 1 tablet by mouth as needed for Erectile Dysfunction Take one tablet daily as needed for erectile dysfunction.  30 tablet 2    labetalol (NORMODYNE) 300 MG tablet Take 1 tablet by mouth daily       No current facility-administered medications for this visit. Past Medical History:   Diagnosis Date    Aneurysm ([address]     Arthritis     Cerebral artery occlusion with cerebral infarction [facility])     [address] 2016    Hypertension      Past Surgical History:   Procedure Laterality Date    EXCISION LESION HAND / FINGER Left 3/28/2019    LEFT HAND MASS EXCISION performed by Humphrey Arizmendi MD at [address] HAND SURGERY Left 03/28/2019    Left thenar eminence Mass    KNEE SURGERY      knee cap repair at age 18    [de-identified] SURGERY      SHOULDER ARTHROSCOPY Left     at [address]     Family History   Problem Relation Age of Onset    High Blood Pressure Mother      Social History     Tobacco Use    Smoking status: Current Every Day Smoker     Packs/day: 1.00     Years: 40.00     Pack years: 40.00     Types: Cigarettes    Smokeless tobacco: Never Used    Tobacco comment: Would like to quit   Substance Use Topics    Alcohol use: No     Social History     Patient does not qualify to have social determinant information on file (likely too young). Social History Narrative    Live with girlfriend. Social History     Substance and Sexual Activity   Sexual Activity Yes    Partners: Female          Patient's past medical history, surgical history, family history, medications,  andallergies  were all reviewed and updated as appropriate today. Review of Systems   Constitutional: Negative for activity change, appetite change, chills, fatigue, fever and malaise/fatigue. HENT: Negative for hearing loss, rhinorrhea, sore throat and trouble swallowing. Eyes: Negative for blurred vision, photophobia and visual disturbance. Respiratory: Positive for cough (unchanged, mild, intermittent). Negative for chest tightness, shortness of breath and wheezing. Cardiovascular: Negative for chest pain, palpitations, orthopnea, leg swelling and PND.    Gastrointestinal: Negative for

## 2020-01-28 ENCOUNTER — TELEPHONE (OUTPATIENT)
Dept: FAMILY MEDICINE CLINIC | Age: 60
End: 2020-01-28

## 2020-01-28 NOTE — TELEPHONE ENCOUNTER
Pt is calling to get a referral to a dentist, pls contact pt at [phone]    Also advised pt to contact [facility] and they'll be able to locate a dentist that's close and takes his insurance.

## 2020-02-18 ENCOUNTER — OFFICE VISIT (OUTPATIENT)
Dept: FAMILY MEDICINE CLINIC | Age: 60
End: 2020-02-18
Payer: COMMERCIAL

## 2020-02-18 VITALS
BODY MASS INDEX: 23.57 KG/M2 | SYSTOLIC BLOOD PRESSURE: 158 MMHG | DIASTOLIC BLOOD PRESSURE: 94 MMHG | HEART RATE: 60 BPM | WEIGHT: 146 LBS | OXYGEN SATURATION: 98 %

## 2020-02-18 PROCEDURE — 4004F PT TOBACCO SCREEN RCVD TLK: CPT | Performed by: FAMILY MEDICINE

## 2020-02-18 PROCEDURE — 3017F COLORECTAL CA SCREEN DOC REV: CPT | Performed by: FAMILY MEDICINE

## 2020-02-18 PROCEDURE — G8420 CALC BMI NORM PARAMETERS: HCPCS | Performed by: FAMILY MEDICINE

## 2020-02-18 PROCEDURE — 99213 OFFICE O/P EST LOW 20 MIN: CPT | Performed by: FAMILY MEDICINE

## 2020-02-18 PROCEDURE — G8427 DOCREV CUR MEDS BY ELIG CLIN: HCPCS | Performed by: FAMILY MEDICINE

## 2020-02-18 PROCEDURE — G8484 FLU IMMUNIZE NO ADMIN: HCPCS | Performed by: FAMILY MEDICINE

## 2020-02-18 RX ORDER — HYDROCHLOROTHIAZIDE 25 MG/1
25 TABLET ORAL EVERY MORNING
Qty: 30 TABLET | Refills: 2 | Status: SHIPPED | OUTPATIENT
Start: 2020-02-18 | End: 2021-02-03 | Stop reason: SDUPTHER

## 2020-02-18 RX ORDER — VARENICLINE TARTRATE 1 MG/1
1 TABLET, FILM COATED ORAL 2 TIMES DAILY
Qty: 180 TABLET | Refills: 1 | Status: CANCELLED | OUTPATIENT
Start: 2020-02-18

## 2020-02-18 RX ORDER — LABETALOL 300 MG/1
300 TABLET, FILM COATED ORAL DAILY
Qty: 30 TABLET | Refills: 2 | Status: SHIPPED | OUTPATIENT
Start: 2020-02-18 | End: 2021-02-03 | Stop reason: SDUPTHER

## 2020-02-18 RX ORDER — AMLODIPINE BESYLATE 10 MG/1
10 TABLET ORAL DAILY
Qty: 30 TABLET | Refills: 2 | Status: SHIPPED | OUTPATIENT
Start: 2020-02-18 | End: 2020-10-10

## 2020-02-18 RX ORDER — CHOLECALCIFEROL (VITAMIN D3) 50 MCG
2000 TABLET ORAL DAILY
Qty: 30 TABLET | Refills: 5 | Status: SHIPPED | OUTPATIENT
Start: 2020-02-18 | End: 2021-03-10 | Stop reason: SDUPTHER

## 2020-02-18 SDOH — ECONOMIC STABILITY: TRANSPORTATION INSECURITY
IN THE PAST 12 MONTHS, HAS THE LACK OF TRANSPORTATION KEPT YOU FROM MEDICAL APPOINTMENTS OR FROM GETTING MEDICATIONS?: NO

## 2020-02-18 SDOH — ECONOMIC STABILITY: FOOD INSECURITY: WITHIN THE PAST 12 MONTHS, YOU WORRIED THAT YOUR FOOD WOULD RUN OUT BEFORE YOU GOT MONEY TO BUY MORE.: OFTEN TRUE

## 2020-02-18 SDOH — ECONOMIC STABILITY: TRANSPORTATION INSECURITY
IN THE PAST 12 MONTHS, HAS LACK OF TRANSPORTATION KEPT YOU FROM MEETINGS, WORK, OR FROM GETTING THINGS NEEDED FOR DAILY LIVING?: NO

## 2020-02-18 SDOH — ECONOMIC STABILITY: INCOME INSECURITY: HOW HARD IS IT FOR YOU TO PAY FOR THE VERY BASICS LIKE FOOD, HOUSING, MEDICAL CARE, AND HEATING?: HARD

## 2020-02-18 SDOH — ECONOMIC STABILITY: FOOD INSECURITY: WITHIN THE PAST 12 MONTHS, THE FOOD YOU BOUGHT JUST DIDN'T LAST AND YOU DIDN'T HAVE MONEY TO GET MORE.: OFTEN TRUE

## 2020-02-18 ASSESSMENT — ENCOUNTER SYMPTOMS
PHOTOPHOBIA: 0
WHEEZING: 0
CHEST TIGHTNESS: 0
VOMITING: 0
NAUSEA: 0
ABDOMINAL PAIN: 0
DIARRHEA: 0
ORTHOPNEA: 0
BLURRED VISION: 0
SHORTNESS OF BREATH: 0
CONSTIPATION: 0

## 2020-02-18 NOTE — PROGRESS NOTES
Negative for abdominal pain, constipation, diarrhea, nausea and vomiting. Musculoskeletal: Negative for arthralgias, myalgias, neck pain and neck stiffness. Neurological: Negative for dizziness, syncope, weakness, light-headedness and headaches. Vitals:    02/18/20 1004   BP: (!) 158/94   Site: Left Upper Arm   Position: Sitting   Cuff Size: Medium Adult   Pulse: 60   SpO2: 98%   Weight: 146 lb (66.2 kg)       Physical Exam  Vitals signs reviewed. Constitutional:       General: He is not in acute distress. Appearance: Normal appearance. He is well-developed. He is not ill-appearing, toxic-appearing or diaphoretic. HENT:      Head: Normocephalic and atraumatic. Right Ear: Hearing, tympanic membrane, ear canal and external ear normal.      Left Ear: Hearing, tympanic membrane, ear canal and external ear normal.      Mouth/Throat:      Pharynx: No oropharyngeal exudate. Eyes:      Conjunctiva/sclera: Conjunctivae normal.      Pupils: Pupils are equal, round, and reactive to light. Neck:      Musculoskeletal: Normal range of motion and neck supple. Thyroid: No thyromegaly. Cardiovascular:      Rate and Rhythm: Normal rate and regular rhythm. Pulses: Normal pulses. Heart sounds: Normal heart sounds. No murmur. No friction rub. No gallop. Pulmonary:      Effort: Pulmonary effort is normal.      Breath sounds: Normal breath sounds. No wheezing. Musculoskeletal: Normal range of motion. Lymphadenopathy:      Cervical: No cervical adenopathy. Skin:     General: Skin is warm and dry. Capillary Refill: Capillary refill takes less than 2 seconds. Findings: No rash. Neurological:      General: No focal deficit present. Mental Status: He is alert and oriented to person, place, and time. Mental status is at baseline. Assessment:  Encounter Diagnoses   Name Primary?     Cigarette nicotine dependence without complication     Essential hypertension     Vitamin D deficiency        Plan:  1. Essential hypertension  - BP elevated but he has not been on HCTZ as prescribed (lost medication)  - restart this and continue amlodipine, labetalol   - amLODIPine (NORVASC) 10 MG tablet; Take 1 tablet by mouth daily  Dispense: 30 tablet; Refill: 2  - aspirin 81 MG tablet; Take 1 tablet by mouth daily  Dispense: 30 tablet; Refill: 5  - hydrochlorothiazide (HYDRODIURIL) 25 MG tablet; Take 1 tablet by mouth every morning  Dispense: 30 tablet; Refill: 2    2. Cigarette nicotine dependence without complication  - continues to work on quitting -- down to 0.5 ppd   - on chantix which is helping          Return in about 2 months (around 4/18/2020) for blood pressure. Marlyn Cueva, [address] [facility]  2/18/2020

## 2021-02-03 ENCOUNTER — OFFICE VISIT (OUTPATIENT)
Dept: INTERNAL MEDICINE CLINIC | Age: 61
End: 2021-02-03
Payer: COMMERCIAL

## 2021-02-03 VITALS
OXYGEN SATURATION: 97 % | WEIGHT: 158.4 LBS | BODY MASS INDEX: 25.46 KG/M2 | TEMPERATURE: 97.2 F | DIASTOLIC BLOOD PRESSURE: 90 MMHG | HEIGHT: 66 IN | HEART RATE: 66 BPM | SYSTOLIC BLOOD PRESSURE: 154 MMHG

## 2021-02-03 DIAGNOSIS — Z13.9 SCREENING FOR CONDITION: ICD-10-CM

## 2021-02-03 DIAGNOSIS — N52.8 OTHER MALE ERECTILE DYSFUNCTION: ICD-10-CM

## 2021-02-03 DIAGNOSIS — I10 HYPERTENSION, UNCONTROLLED: Primary | ICD-10-CM

## 2021-02-03 DIAGNOSIS — S06.5XAA SUBDURAL HEMATOMA: ICD-10-CM

## 2021-02-03 DIAGNOSIS — J43.8 OTHER EMPHYSEMA (HCC): ICD-10-CM

## 2021-02-03 DIAGNOSIS — Z83.3 FAMILY HISTORY OF DIABETES MELLITUS (DM): ICD-10-CM

## 2021-02-03 DIAGNOSIS — F17.210 SMOKES CIGARETTES: ICD-10-CM

## 2021-02-03 PROCEDURE — G8419 CALC BMI OUT NRM PARAM NOF/U: HCPCS | Performed by: INTERNAL MEDICINE

## 2021-02-03 PROCEDURE — G8926 SPIRO NO PERF OR DOC: HCPCS | Performed by: INTERNAL MEDICINE

## 2021-02-03 PROCEDURE — 3017F COLORECTAL CA SCREEN DOC REV: CPT | Performed by: INTERNAL MEDICINE

## 2021-02-03 PROCEDURE — 99214 OFFICE O/P EST MOD 30 MIN: CPT | Performed by: INTERNAL MEDICINE

## 2021-02-03 PROCEDURE — G8484 FLU IMMUNIZE NO ADMIN: HCPCS | Performed by: INTERNAL MEDICINE

## 2021-02-03 PROCEDURE — 3023F SPIROM DOC REV: CPT | Performed by: INTERNAL MEDICINE

## 2021-02-03 PROCEDURE — G0296 VISIT TO DETERM LDCT ELIG: HCPCS | Performed by: INTERNAL MEDICINE

## 2021-02-03 PROCEDURE — 99406 BEHAV CHNG SMOKING 3-10 MIN: CPT | Performed by: INTERNAL MEDICINE

## 2021-02-03 PROCEDURE — G8427 DOCREV CUR MEDS BY ELIG CLIN: HCPCS | Performed by: INTERNAL MEDICINE

## 2021-02-03 PROCEDURE — 4004F PT TOBACCO SCREEN RCVD TLK: CPT | Performed by: INTERNAL MEDICINE

## 2021-02-03 RX ORDER — HYDROCHLOROTHIAZIDE 25 MG/1
25 TABLET ORAL EVERY MORNING
Qty: 30 TABLET | Refills: 2 | Status: CANCELLED | OUTPATIENT
Start: 2021-02-03

## 2021-02-03 RX ORDER — AMLODIPINE BESYLATE 10 MG/1
TABLET ORAL
Qty: 30 TABLET | Refills: 2 | Status: SHIPPED | OUTPATIENT
Start: 2021-02-03 | End: 2021-03-10 | Stop reason: SDUPTHER

## 2021-02-03 RX ORDER — CHOLECALCIFEROL (VITAMIN D3) 50 MCG
2000 TABLET ORAL DAILY
Qty: 30 TABLET | Refills: 5 | Status: CANCELLED | OUTPATIENT
Start: 2021-02-03

## 2021-02-03 RX ORDER — LABETALOL 300 MG/1
300 TABLET, FILM COATED ORAL DAILY
Qty: 30 TABLET | Refills: 2 | Status: CANCELLED | OUTPATIENT
Start: 2021-02-03

## 2021-02-03 RX ORDER — TADALAFIL 5 MG/1
5 TABLET ORAL PRN
Qty: 30 TABLET | Refills: 2 | Status: CANCELLED | OUTPATIENT
Start: 2021-02-03

## 2021-02-03 RX ORDER — LABETALOL 300 MG/1
300 TABLET, FILM COATED ORAL DAILY
Qty: 30 TABLET | Refills: 2 | Status: SHIPPED | OUTPATIENT
Start: 2021-02-03 | End: 2021-03-10 | Stop reason: SDUPTHER

## 2021-02-03 RX ORDER — HYDROCHLOROTHIAZIDE 25 MG/1
25 TABLET ORAL EVERY MORNING
Qty: 30 TABLET | Refills: 2 | Status: SHIPPED | OUTPATIENT
Start: 2021-02-03 | End: 2021-03-10 | Stop reason: SDUPTHER

## 2021-02-03 RX ORDER — AMLODIPINE BESYLATE 10 MG/1
TABLET ORAL
Qty: 30 TABLET | Refills: 0 | Status: CANCELLED | OUTPATIENT
Start: 2021-02-03

## 2021-02-03 RX ORDER — VARENICLINE TARTRATE 1 MG/1
1 TABLET, FILM COATED ORAL 2 TIMES DAILY
Qty: 60 TABLET | Refills: 3 | Status: SHIPPED | OUTPATIENT
Start: 2021-02-03 | End: 2021-05-10 | Stop reason: SDUPTHER

## 2021-02-03 ASSESSMENT — PATIENT HEALTH QUESTIONNAIRE - PHQ9
1. LITTLE INTEREST OR PLEASURE IN DOING THINGS: 0
SUM OF ALL RESPONSES TO PHQ9 QUESTIONS 1 & 2: 0

## 2021-02-03 NOTE — PROGRESS NOTES
SUBJECTIVE:  Bary Claude is a 64 y.o. male HERE FOR   Chief Complaint   Patient presents with    New Patient    Hypertension      PT HERE TO INITIATE CARE             [facility]    HTN - TAKING 2 OF 3 MEDS - OUT OF LABETALOL. ELEVATED BP. ? DIET / EXERCISE COMPLIANCE. NO HEADACHE, NO DIZZINESS  EMPHYSEMA - NOTED ON PREVIOUS CT. NO SOB, NO WHEEZING   ED - ON MED. REQUESTING REFILL  H/O SUBDURAL HEMATOMA - DENIES HA  + SMOKER - CESSATION ADVISED  + FH DM - DIET / EXERCISE REVIEWED. Olivia Guillory SCREENING LABS D/W PT      DENIES CP, No SOB, No PALPITATIONS, No COUGH, NO F/C  No ABD PAIN, No N/V, No DIARRHEA, No CONSTIPATION, No MELENA, No HEMATOCHEZIA. No DYSURIA, No FREQ, No URGENCY, No HEMATURIA    PMH: REVIEWED AND UPDATED TODAY    PSH: REVIEWED AND UPDATED TODAY    SOCIAL HX: REVIEWED AND UPDATED TODAY    FAMILY HX: REVIEWED AND UPDATED TODAY    ALLERGY:  Pcn [penicillins]    MEDS: REVIEWED  Prior to Visit Medications    Medication Sig Taking? Authorizing Provider   amLODIPine (NORVASC) 10 MG tablet TAKE 1 TABLET BY MOUTH EVERY DAY Yes MARGARITA Ureña CNP   Cholecalciferol (VITAMIN D) 50 MCG (2000 UT) TABS tablet Take 1 tablet by mouth daily Yes Oliver Garcia MD   aspirin 81 MG tablet Take 1 tablet by mouth daily Yes Oliver Garcia MD   labetalol (NORMODYNE) 300 MG tablet Take 1 tablet by mouth daily Yes Oliver Garcia MD   hydrochlorothiazide (HYDRODIURIL) 25 MG tablet Take 1 tablet by mouth every morning Yes Oliver Garcia MD   varenicline (CHANTIX) 1 MG tablet Take 1 tablet by mouth 2 times daily Yes Oliver Garcia MD   tadalafil (CIALIS) 5 MG tablet Take 1 tablet by mouth as needed for Erectile Dysfunction Take one tablet daily as needed for erectile dysfunction.  Yes MARGARITA Faith CNP   guaiFENesin (MUCUS RELIEF) 600 MG extended release tablet Take 2 tablets by mouth 2 times daily  Patient not taking: Reported on 2/3/2021  Oliver Garcia MD acetaminophen (TYLENOL) 325 MG tablet Take 650 mg by mouth as needed  Historical Provider, MD         IMMUNIZATION: DOES NOT TAKE INFLUENZA, PNEUMOVAX 2018, TETANUS 2012      ROS: COMPREHENSIVE ROS AS IN HX, REST -VE  History obtained from chart review and the patient  NO PRIOR C SCOPE      OBJECTIVE:   NURSING NOTE AND VITALS REVIEWED  BP (!) 160/90 (Site: Left Upper Arm, Position: Sitting, Cuff Size: Large Adult)   Pulse 58   Temp 97.2 °F (36.2 °C)   Ht 5' 6\" (1.676 m)   Wt 158 lb 6.4 oz (71.8 kg)   SpO2 97%   BMI 25.57 kg/m²     NO ACUTE DISTRESS  + NICOTINE BREATH    REPEAT BP: 154/90 (NAOMI), NO ORTHOSTASIS     REPEAT PULSE: 66 - MANUAL    Body mass index is 25.57 kg/m². HEENT: NO PALLOR, ANICTERIC, PERRLA, EOMI, NO CONJUNCTIVAL ERYTHEMA,                 NO SINUS TENDERNESS  NECK:  SUPPLE, TRACHEA MIDLINE, NT, NO JVD, NO CB, NO LA, NO TM, NO STIFFNESS  CHEST: RESPY EFFORT NL, GOOD AE, NO W/R/C  HEART: S1S2+ REG, NO M/G/R  ABD: SOFT, NT, NO HSM, BS+  EXT: NO EDEMA, NT, PULSES +. SARA'S -VE  NEURO: ALERT AND ORIENTED X 3, NO MENINGEAL SIGNS, NO TREMORS, NL GAIT, NO FOCAL DEFICITS  PSYCH: FAIRLY GOOD AFFECT  BACK: NT, NO ROM, NO CVA TENDERNESS    PREVIOUS LABS / CT REVIEWED AND D/W PT        ASSESSMENT / PLAN:     Diagnosis Orders   1. Hypertension, uncontrolled  COUNSELLED. RESUME LABETALOL. CONTINUE OTHER MEDS  ADVISED LOW NA+ / DASH DIET/ EXERCISE. MONITOR. GOAL </= 120/80  F/U LABS  MAKE CHANGES AS NEEDED. 2. Other emphysema ([address]  COUNSELLED. ASYMPTOMATIC PER HX  DEFERRED INHALER  ADVISED SMOKING CESSATION  MONITOR ON FOLLOW UP CT  MAKE CHANGES AS NEEDED. 3. Other male erectile dysfunction  COUNSELLED. READDRESS MED FOLLOWING  BP CONTROL  D/W PT. MAKE CHANGES AS NEEDED. 4. Subdural hematoma (HCC)  COUNSELLED. NO NEW ISSUES. DEFERRED CT  ADVISED ON NEED FOR BP CONTROL  PT VERBALIZED UNDERSTANDING  MAKE CHANGES AS NEEDED. Patients with comorbidities resulting in life expectancy of < 10 years, or that would preclude treatment of an abnormality identified on CT, should not be screened due to lack of benefit.     To obtain maximal benefit from this screening, smoking cessation and long-term abstinence from smoking is critical          MEDICATION SIDE EFFECTS D/W PATIENT     RETURN TO CLINIC WITHIN 4 WEEKS / PRN    FOLLOW UP FOR FASTING LABS, CT LUNG

## 2021-02-03 NOTE — PATIENT INSTRUCTIONS
TAKE MED AS ADVISED    DIET/ EXERCISE. FOLLOW UP WITHIN 4 WEEKS / AS NEEDED    FOLLOW UP FOR FASTING LABS, CT LUNG    What is lung cancer screening? Lung cancer screening is a way in which doctors check the lungs for early signs of cancer in people who have no symptoms of lung cancer. A low-dose CT scan uses much less radiation than a normal CT scan and shows a more detailed image of the lungs than a standard X-ray. The goal of lung cancer screening is to find cancer early, before it has a chance to grow, spread, or cause problems. One large study found that smokers who were screened with low-dose CT scans were less likely to die of lung cancer than those who were screened with standard X-ray. Below is a summary of the things you need to know regarding screening for lung cancer with low-dose computed tomography (LDCT). This is a screening program that involves routine annual screening with LDCT studies of the lung. The LDCTs are done using low-dose radiation that is not thought to increase your cancer risk. If you have other serious medical conditions (other cancers, congestive heart failure) that limit your life expectancy to less than 10 years, you should not undergo lung cancer screening with LDCT. The chance is 20%-60% that the LDCT result will show abnormalities. This would require additional testing which could include repeat imaging or even invasive procedures. Most (about 95%) of \"abnormal\" LDCT results are false in the sense that no lung cancer is ultimately found. Additionally, some (about 10%) of the cancers found would not affect your life expectancy, even if undetected and untreated. If you are still smoking, the single most important thing that you can do to reduce your risk of dying of lung cancer is to quit. For this screening to be covered by Medicare and most other insurers, strict criteria must be met. If you do not meet these criteria, but still wish to undergo LDCT testing, you will be required to sign a waiver indicating your willingness to pay for the scan.

## 2021-03-05 ENCOUNTER — HOSPITAL ENCOUNTER (OUTPATIENT)
Dept: CT IMAGING | Age: 61
Discharge: HOME | End: 2021-03-05
Payer: COMMERCIAL

## 2021-03-05 DIAGNOSIS — F17.210 SMOKES CIGARETTES: ICD-10-CM

## 2021-03-05 PROCEDURE — 71271 CT THORAX LUNG CANCER SCR C-: CPT

## 2021-03-06 ENCOUNTER — HOSPITAL ENCOUNTER (OUTPATIENT)
Age: 61
Discharge: HOME | End: 2021-03-06
Payer: COMMERCIAL

## 2021-03-06 DIAGNOSIS — I10 HYPERTENSION, UNCONTROLLED: ICD-10-CM

## 2021-03-06 DIAGNOSIS — Z13.9 SCREENING FOR CONDITION: ICD-10-CM

## 2021-03-06 DIAGNOSIS — Z83.3 FAMILY HISTORY OF DIABETES MELLITUS (DM): ICD-10-CM

## 2021-03-06 LAB
A/G RATIO: 1.4 (ref 1.1–2.2)
ALBUMIN SERPL-MCNC: 4.6 G/DL (ref 3.4–5)
ALP BLD-CCNC: 100 U/L (ref 40–129)
ALT SERPL-CCNC: 12 U/L (ref 10–40)
ANION GAP SERPL CALCULATED.3IONS-SCNC: 10 MMOL/L (ref 3–16)
AST SERPL-CCNC: 18 U/L (ref 15–37)
BASOPHILS ABSOLUTE: 0.1 K/UL (ref 0–0.2)
BASOPHILS RELATIVE PERCENT: 1.1 %
BILIRUB SERPL-MCNC: 0.6 MG/DL (ref 0–1)
BUN BLDV-MCNC: 14 MG/DL (ref 7–20)
CALCIUM SERPL-MCNC: 9.6 MG/DL (ref 8.3–10.6)
CHLORIDE BLD-SCNC: 102 MMOL/L (ref 99–110)
CHOLESTEROL, TOTAL: 172 MG/DL (ref 0–199)
CO2: 27 MMOL/L (ref 21–32)
CREAT SERPL-MCNC: 1 MG/DL (ref 0.9–1.3)
CREATININE URINE: 155.2 MG/DL (ref 39–259)
EOSINOPHILS ABSOLUTE: 0.2 K/UL (ref 0–0.6)
EOSINOPHILS RELATIVE PERCENT: 3.5 %
ESTIMATED AVERAGE GLUCOSE: 82.5 MG/DL
GFR AFRICAN AMERICAN: >60
GFR NON-AFRICAN AMERICAN: >60
GLOBULIN: 3.4 G/DL
GLUCOSE BLD-MCNC: 89 MG/DL (ref 70–99)
HBA1C MFR BLD: 4.5 %
HCT VFR BLD CALC: 43.8 % (ref 40.5–52.5)
HDLC SERPL-MCNC: 61 MG/DL (ref 40–60)
HEMOGLOBIN: 14.2 G/DL (ref 13.5–17.5)
LDL CHOLESTEROL CALCULATED: 98 MG/DL
LYMPHOCYTES ABSOLUTE: 2.7 K/UL (ref 1–5.1)
LYMPHOCYTES RELATIVE PERCENT: 47 %
MCH RBC QN AUTO: 29.1 PG (ref 26–34)
MCHC RBC AUTO-ENTMCNC: 32.5 G/DL (ref 31–36)
MCV RBC AUTO: 89.6 FL (ref 80–100)
MICROALBUMIN UR-MCNC: 2.8 MG/DL
MICROALBUMIN/CREAT UR-RTO: 18 MG/G (ref 0–30)
MONOCYTES ABSOLUTE: 0.5 K/UL (ref 0–1.3)
MONOCYTES RELATIVE PERCENT: 9.6 %
NEUTROPHILS ABSOLUTE: 2.2 K/UL (ref 1.7–7.7)
NEUTROPHILS RELATIVE PERCENT: 38.8 %
PDW BLD-RTO: 14.5 % (ref 12.4–15.4)
PLATELET # BLD: 189 K/UL (ref 135–450)
PLATELET SLIDE REVIEW: ADEQUATE
PMV BLD AUTO: 10.4 FL (ref 5–10.5)
POTASSIUM SERPL-SCNC: 3.6 MMOL/L (ref 3.5–5.1)
PROSTATE SPECIFIC ANTIGEN: 0.62 NG/ML (ref 0–4)
RBC # BLD: 4.89 M/UL (ref 4.2–5.9)
SLIDE REVIEW: NORMAL
SODIUM BLD-SCNC: 139 MMOL/L (ref 136–145)
TOTAL PROTEIN: 8 G/DL (ref 6.4–8.2)
TRIGL SERPL-MCNC: 64 MG/DL (ref 0–150)
TSH REFLEX: 0.97 UIU/ML (ref 0.27–4.2)
VITAMIN D 25-HYDROXY: 16.5 NG/ML
VLDLC SERPL CALC-MCNC: 13 MG/DL
WBC # BLD: 5.6 K/UL (ref 4–11)

## 2021-03-06 PROCEDURE — 80053 COMPREHEN METABOLIC PANEL: CPT

## 2021-03-06 PROCEDURE — 82043 UR ALBUMIN QUANTITATIVE: CPT

## 2021-03-06 PROCEDURE — 83036 HEMOGLOBIN GLYCOSYLATED A1C: CPT

## 2021-03-06 PROCEDURE — 84153 ASSAY OF PSA TOTAL: CPT

## 2021-03-06 PROCEDURE — 82306 VITAMIN D 25 HYDROXY: CPT

## 2021-03-06 PROCEDURE — 84443 ASSAY THYROID STIM HORMONE: CPT

## 2021-03-06 PROCEDURE — 85025 COMPLETE CBC W/AUTO DIFF WBC: CPT

## 2021-03-06 PROCEDURE — 36415 COLL VENOUS BLD VENIPUNCTURE: CPT

## 2021-03-06 PROCEDURE — 80061 LIPID PANEL: CPT

## 2021-03-06 PROCEDURE — 82570 ASSAY OF URINE CREATININE: CPT

## 2021-03-10 ENCOUNTER — OFFICE VISIT (OUTPATIENT)
Dept: INTERNAL MEDICINE CLINIC | Age: 61
End: 2021-03-10
Payer: COMMERCIAL

## 2021-03-10 VITALS
WEIGHT: 161.2 LBS | OXYGEN SATURATION: 98 % | SYSTOLIC BLOOD PRESSURE: 134 MMHG | HEART RATE: 62 BPM | TEMPERATURE: 97.2 F | DIASTOLIC BLOOD PRESSURE: 80 MMHG | BODY MASS INDEX: 26.02 KG/M2

## 2021-03-10 DIAGNOSIS — E55.9 VITAMIN D DEFICIENCY: ICD-10-CM

## 2021-03-10 DIAGNOSIS — J43.8 OTHER EMPHYSEMA (HCC): ICD-10-CM

## 2021-03-10 DIAGNOSIS — I10 ESSENTIAL HYPERTENSION: Primary | ICD-10-CM

## 2021-03-10 DIAGNOSIS — S06.5XAA SUBDURAL HEMATOMA: ICD-10-CM

## 2021-03-10 DIAGNOSIS — N52.8 OTHER MALE ERECTILE DYSFUNCTION: ICD-10-CM

## 2021-03-10 DIAGNOSIS — Z83.3 FAMILY HISTORY OF DIABETES MELLITUS (DM): ICD-10-CM

## 2021-03-10 DIAGNOSIS — F17.210 SMOKES CIGARETTES: ICD-10-CM

## 2021-03-10 PROCEDURE — G8427 DOCREV CUR MEDS BY ELIG CLIN: HCPCS | Performed by: INTERNAL MEDICINE

## 2021-03-10 PROCEDURE — 3017F COLORECTAL CA SCREEN DOC REV: CPT | Performed by: INTERNAL MEDICINE

## 2021-03-10 PROCEDURE — G8419 CALC BMI OUT NRM PARAM NOF/U: HCPCS | Performed by: INTERNAL MEDICINE

## 2021-03-10 PROCEDURE — 4004F PT TOBACCO SCREEN RCVD TLK: CPT | Performed by: INTERNAL MEDICINE

## 2021-03-10 PROCEDURE — 3023F SPIROM DOC REV: CPT | Performed by: INTERNAL MEDICINE

## 2021-03-10 PROCEDURE — G8926 SPIRO NO PERF OR DOC: HCPCS | Performed by: INTERNAL MEDICINE

## 2021-03-10 PROCEDURE — G8484 FLU IMMUNIZE NO ADMIN: HCPCS | Performed by: INTERNAL MEDICINE

## 2021-03-10 PROCEDURE — 99214 OFFICE O/P EST MOD 30 MIN: CPT | Performed by: INTERNAL MEDICINE

## 2021-03-10 RX ORDER — LABETALOL 300 MG/1
300 TABLET, FILM COATED ORAL DAILY
Qty: 90 TABLET | Refills: 0 | Status: SHIPPED | OUTPATIENT
Start: 2021-03-10 | End: 2021-05-10 | Stop reason: SDUPTHER

## 2021-03-10 RX ORDER — AMLODIPINE BESYLATE 10 MG/1
TABLET ORAL
Qty: 90 TABLET | Refills: 0 | Status: SHIPPED | OUTPATIENT
Start: 2021-03-10 | End: 2021-05-10 | Stop reason: SDUPTHER

## 2021-03-10 RX ORDER — CHOLECALCIFEROL (VITAMIN D3) 50 MCG
2000 TABLET ORAL DAILY
Qty: 90 TABLET | Refills: 0 | Status: SHIPPED | OUTPATIENT
Start: 2021-03-10 | End: 2021-05-10 | Stop reason: SDUPTHER

## 2021-03-10 RX ORDER — CHOLECALCIFEROL (VITAMIN D3) 50 MCG
2000 TABLET ORAL DAILY
Qty: 30 TABLET | Refills: 5 | Status: CANCELLED | OUTPATIENT
Start: 2021-03-10

## 2021-03-10 RX ORDER — TADALAFIL 5 MG/1
5 TABLET ORAL DAILY PRN
Qty: 30 TABLET | Refills: 2 | Status: SHIPPED | OUTPATIENT
Start: 2021-03-10 | End: 2021-05-10 | Stop reason: SDUPTHER

## 2021-03-10 RX ORDER — HYDROCHLOROTHIAZIDE 25 MG/1
25 TABLET ORAL EVERY MORNING
Qty: 90 TABLET | Refills: 0 | Status: SHIPPED | OUTPATIENT
Start: 2021-03-10 | End: 2021-05-10 | Stop reason: SDUPTHER

## 2021-03-10 SDOH — ECONOMIC STABILITY: FOOD INSECURITY: WITHIN THE PAST 12 MONTHS, THE FOOD YOU BOUGHT JUST DIDN'T LAST AND YOU DIDN'T HAVE MONEY TO GET MORE.: NEVER TRUE

## 2021-03-10 NOTE — PROGRESS NOTES
SUBJECTIVE:  Kimmie Mratin is a 64 y.o. male HERE FOR   Chief Complaint   Patient presents with    Hypertension    Other           PT HERE FOR EVAL     HTN - TAKING MEDS . ELEVATED BP. ? DIET / EXERCISE COMPLIANCE. NO HEADACHE, NO DIZZINESS  EMPHYSEMA - ON PREVIOUS CT. NO SOB, NO WHEEZING   VIT D DEF - LAB D/W PT. NOT TAKING MED AT THIS TIME  ED - ON MED. PT REQUESTING REFILL  H/O SUBDURAL HEMATOMA - DENIES HA  + SMOKER - CESSATION ADVISED  + FH DM - DIET / EXERCISE REVIEWED. LAB NEGATIVE FOR DM.       DENIES CP, No SOB, No PALPITATIONS, No COUGH, NO F/C  No ABD PAIN, No N/V, No DIARRHEA, No CONSTIPATION, No MELENA, No HEMATOCHEZIA. No DYSURIA, No FREQ, No URGENCY, No HEMATURIA      PMH: REVIEWED AND UPDATED TODAY    PSH: REVIEWED AND UPDATED TODAY    SOCIAL HX: REVIEWED AND UPDATED TODAY    FAMILY HX: REVIEWED AND UPDATED TODAY    ALLERGY:  Pcn [penicillins]    MEDS: REVIEWED    Prior to Visit Medications    Medication Sig Taking? Authorizing Provider   amLODIPine (NORVASC) 10 MG tablet TAKE 1 TABLET BY MOUTH EVERY DAY Yes Nevaeh Eldridge MD   labetalol (NORMODYNE) 300 MG tablet Take 1 tablet by mouth daily Yes Nevaeh Eldridge MD   hydroCHLOROthiazide (HYDRODIURIL) 25 MG tablet Take 1 tablet by mouth every morning Yes Nevaeh Eldridge MD   varenicline (CHANTIX CONTINUING MONTH ROSARIO) 1 MG tablet Take 1 tablet by mouth 2 times daily Yes Nevaeh Eldridge MD   Cholecalciferol (VITAMIN D) 50 MCG (2000 UT) TABS tablet Take 1 tablet by mouth daily  Patient not taking: Reported on 3/10/2021  Sara Valera MD   aspirin 81 MG tablet Take 1 tablet by mouth daily  Sara Valera MD   acetaminophen (TYLENOL) 325 MG tablet Take 650 mg by mouth as needed  Historical Provider, MD   tadalafil (CIALIS) 5 MG tablet Take 1 tablet by mouth as needed for Erectile Dysfunction Take one tablet daily as needed for erectile dysfunction.   Jana Baird, APRN - CNP        ROS: COMPREHENSIVE ROS AS IN HX, REST -VE  History obtained from chart review and the patient    OBJECTIVE:   NURSING NOTE AND VITALS REVIEWED  BP (!) 152/82 (Site: Left Upper Arm, Position: Sitting, Cuff Size: Medium Adult)   Pulse 62   Temp 97.2 °F (36.2 °C)   Wt 161 lb 3.2 oz (73.1 kg)   SpO2 98%   BMI 26.02 kg/m²     NO ACUTE DISTRESS    REPEAT BP: 134/80 (LT), NO ORTHOSTASIS     Body mass index is 26.02 kg/m². HEENT: NO PALLOR, ANICTERIC, PERRLA, EOMI, NO CONJUNCTIVAL ERYTHEMA,                 NO SINUS TENDERNESS  NECK:  SUPPLE, TRACHEA MIDLINE, NT, NO JVD, NO CB, NO LA, NO TM, NO STIFFNESS  CHEST: RESPY EFFORT NL, GOOD AE, NO W/R/C  HEART: S1S2+ REG, NO M/G/R  ABD: SOFT, NT, NO HSM, BS+  EXT: NO EDEMA, NT, PULSES +. SARA'S -VE  NEURO: ALERT AND ORIENTED X 3, NO MENINGEAL SIGNS, NO TREMORS, NL GAIT, NO FOCAL DEFICITS  PSYCH: FAIRLY GOOD AFFECT  BACK: NT, NO ROM, NO CVA TENDERNESS    PREVIOUS LABS REVIEWED AND D/W PT       ASSESSMENT / PLAN:     Diagnosis Orders   1. Essential hypertension  COUNSELLED. REPEAT BP AS ABOVE  CONTINUE MEDS - ADVISED MED COMPLIANCE. ADVISED LOW NA+ / DASH DIET/ EXERCISE. MONITOR. GOAL </= 120/80  MAKE CHANGES AS NEEDED. 2. Other emphysema ([address]  COUNSELLED. DEFERRED INHALER. ADVISED SMOKING CESSATION. MONITOR  MAKE CHANGES AS NEEDED. 3. Vitamin D deficiency  COUNSELLED. ADVISED  VIT D 2000 U DAILY. MONITOR AND MAKE CHANGES AS NEEDED. 4. Other male erectile dysfunction  COUNSELLED. CIALIS 5 MG - S/E D/W PT.  MONITOR. MAKE CHANGES AS NEEDED. 5. Subdural hematoma ([address]  COUNSELLED. NO NEW ISSUES. MONITO  MAKE CHANGES AS NEEDED. 6. Smokes cigarettes  Smoking cessation was encouraged. Cessation techniques reviewed today. COUNSELLED. CESSATION ADVISED   COMPLICATIONS OF TOBACCO USE INCLUDING COPD, CANCER, CAD D/W PT  PT VERBALIZED UNDERSTANDING  MAKE CHANGES AS NEEDED. 7. Family history of diabetes mellitus (DM)  COUNSELLED. LAB NEGATIVE FOR DM.   ADVISED ON DIET / EXERCISE  ADVISED RISK FACTOR MODIFICATION. MONITOR  MAKE CHANGES AS NEEDED.                        MEDICATION SIDE EFFECTS D/W PATIENT    PT STABLE AT TIME OF D/C.    RETURN TO CLINIC WITHIN 2-3 MONTHS / PRN

## 2021-03-11 DIAGNOSIS — I10 ESSENTIAL HYPERTENSION: ICD-10-CM

## 2021-03-11 RX ORDER — ASPIRIN 81 MG/1
TABLET, CHEWABLE ORAL
Qty: 30 TABLET | Refills: 5 | Status: SHIPPED | OUTPATIENT
Start: 2021-03-11 | End: 2021-05-10 | Stop reason: SDUPTHER

## 2021-05-10 ENCOUNTER — OFFICE VISIT (OUTPATIENT)
Dept: INTERNAL MEDICINE CLINIC | Age: 61
End: 2021-05-10
Payer: COMMERCIAL

## 2021-05-10 VITALS
WEIGHT: 158.8 LBS | OXYGEN SATURATION: 98 % | BODY MASS INDEX: 29.22 KG/M2 | HEIGHT: 62 IN | HEART RATE: 74 BPM | DIASTOLIC BLOOD PRESSURE: 70 MMHG | SYSTOLIC BLOOD PRESSURE: 135 MMHG

## 2021-05-10 DIAGNOSIS — M54.50 CHRONIC RIGHT-SIDED LOW BACK PAIN WITHOUT SCIATICA: Primary | ICD-10-CM

## 2021-05-10 DIAGNOSIS — N52.8 OTHER MALE ERECTILE DYSFUNCTION: ICD-10-CM

## 2021-05-10 DIAGNOSIS — F17.210 SMOKES CIGARETTES: ICD-10-CM

## 2021-05-10 DIAGNOSIS — I10 ESSENTIAL HYPERTENSION: ICD-10-CM

## 2021-05-10 DIAGNOSIS — E55.9 VITAMIN D DEFICIENCY: ICD-10-CM

## 2021-05-10 DIAGNOSIS — G89.29 CHRONIC RIGHT-SIDED LOW BACK PAIN WITHOUT SCIATICA: Primary | ICD-10-CM

## 2021-05-10 PROCEDURE — 99214 OFFICE O/P EST MOD 30 MIN: CPT | Performed by: NURSE PRACTITIONER

## 2021-05-10 PROCEDURE — 4004F PT TOBACCO SCREEN RCVD TLK: CPT | Performed by: NURSE PRACTITIONER

## 2021-05-10 PROCEDURE — G8427 DOCREV CUR MEDS BY ELIG CLIN: HCPCS | Performed by: NURSE PRACTITIONER

## 2021-05-10 PROCEDURE — G8419 CALC BMI OUT NRM PARAM NOF/U: HCPCS | Performed by: NURSE PRACTITIONER

## 2021-05-10 PROCEDURE — 3017F COLORECTAL CA SCREEN DOC REV: CPT | Performed by: NURSE PRACTITIONER

## 2021-05-10 RX ORDER — ASPIRIN 81 MG/1
81 TABLET, CHEWABLE ORAL DAILY
Qty: 30 TABLET | Refills: 2 | Status: SHIPPED | OUTPATIENT
Start: 2021-05-10 | End: 2022-02-01

## 2021-05-10 RX ORDER — HYDROCHLOROTHIAZIDE 25 MG/1
25 TABLET ORAL EVERY MORNING
Qty: 90 TABLET | Refills: 0 | Status: SHIPPED | OUTPATIENT
Start: 2021-05-10 | End: 2021-09-07 | Stop reason: SDUPTHER

## 2021-05-10 RX ORDER — LABETALOL 300 MG/1
300 TABLET, FILM COATED ORAL DAILY
Qty: 90 TABLET | Refills: 0 | Status: SHIPPED | OUTPATIENT
Start: 2021-05-10 | End: 2021-09-07 | Stop reason: SDUPTHER

## 2021-05-10 RX ORDER — AMLODIPINE BESYLATE 10 MG/1
TABLET ORAL
Qty: 90 TABLET | Refills: 0 | Status: SHIPPED | OUTPATIENT
Start: 2021-05-10 | End: 2021-09-07 | Stop reason: SDUPTHER

## 2021-05-10 RX ORDER — VARENICLINE TARTRATE 1 MG/1
1 TABLET, FILM COATED ORAL 2 TIMES DAILY
Qty: 60 TABLET | Refills: 1 | Status: SHIPPED | OUTPATIENT
Start: 2021-05-10 | End: 2022-09-29

## 2021-05-10 RX ORDER — TADALAFIL 5 MG/1
5 TABLET ORAL DAILY PRN
Qty: 30 TABLET | Refills: 2 | Status: SHIPPED | OUTPATIENT
Start: 2021-05-10 | End: 2021-09-07 | Stop reason: SDUPTHER

## 2021-05-10 RX ORDER — CHOLECALCIFEROL (VITAMIN D3) 50 MCG
2000 TABLET ORAL DAILY
Qty: 90 TABLET | Refills: 0 | Status: SHIPPED | OUTPATIENT
Start: 2021-05-10 | End: 2021-09-07 | Stop reason: SDUPTHER

## 2021-05-10 SDOH — ECONOMIC STABILITY: INCOME INSECURITY: HOW HARD IS IT FOR YOU TO PAY FOR THE VERY BASICS LIKE FOOD, HOUSING, MEDICAL CARE, AND HEATING?: PATIENT REFUSED

## 2021-05-10 ASSESSMENT — PATIENT HEALTH QUESTIONNAIRE - PHQ9
SUM OF ALL RESPONSES TO PHQ QUESTIONS 1-9: 0
SUM OF ALL RESPONSES TO PHQ9 QUESTIONS 1 & 2: 0

## 2021-05-10 ASSESSMENT — ENCOUNTER SYMPTOMS
EYES NEGATIVE: 1
CHEST TIGHTNESS: 0
GASTROINTESTINAL NEGATIVE: 1
SHORTNESS OF BREATH: 0
BLURRED VISION: 0
BOWEL INCONTINENCE: 0
BACK PAIN: 1

## 2021-05-10 NOTE — PATIENT INSTRUCTIONS
We will call with xray result. Continue current medications. Try Tylenol extra strength for back pain. Patient Education        Back Stretches: Exercises  Introduction  Here are some examples of exercises for stretching your back. Start each exercise slowly. Ease off the exercise if you start to have pain. Your doctor or physical therapist will tell you when you can start these exercises and which ones will work best for you. How to do the exercises  Overhead stretch   1. Stand comfortably with your feet shoulder-width apart. 2. Looking straight ahead, raise both arms over your head and reach toward the ceiling. Do not allow your head to tilt back. 3. Hold for 15 to 30 seconds, then lower your arms to your sides. 4. Repeat 2 to 4 times. Side stretch   1. Stand comfortably with your feet shoulder-width apart. 2. Raise one arm over your head, and then lean to the other side. 3. Slide your hand down your leg as you let the weight of your arm gently stretch your side muscles. Hold for 15 to 30 seconds. 4. Repeat 2 to 4 times on each side. Press-up   1. Lie on your stomach, supporting your body with your forearms. 2. Press your elbows down into the floor to raise your upper back. As you do this, relax your stomach muscles and allow your back to arch without using your back muscles. As your press up, do not let your hips or pelvis come off the floor. 3. Hold for 15 to 30 seconds, then relax. 4. Repeat 2 to 4 times. Relax and rest   1. Lie on your back with a rolled towel under your neck and a pillow under your knees. Extend your arms comfortably to your sides. 2. Relax and breathe normally. 3. Remain in this position for about 10 minutes. 4. If you can, do this 2 or 3 times each day. Follow-up care is a key part of your treatment and safety. Be sure to make and go to all appointments, and call your doctor if you are having problems.  It's also a good idea to know your test results and keep a list of the medicines you take. Where can you learn more? Go to [email or web address] and sign in to your [employer] account. Enter V126 in the [employer] box to learn more about \"Back Stretches: Exercises. \"     If you do not have an account, please click on the \"Sign Up Now\" link. Current as of: November 16, 2020               Content Version: 12.8  © 2006-2021 Healthwise, Incorporated. Care instructions adapted under license by [facility] ([facility]). If you have questions about a medical condition or this instruction, always ask your healthcare professional. [address] any warranty or liability for your use of this information.

## 2021-05-10 NOTE — PROGRESS NOTES
Swetha Tanner   YOB: 1960    Date of Visit:  5/10/2021      Chief Complaint   Patient presents with    Follow-up    Hypertension    Lower Back Pain         HPI Patient presents to the office for 2 month f/u for HTN. Has been taking medication as prescribed. - Also c/o lower back pain that started about 1 year ago. - Also says he never received his Cialis. Hypertension  This is a chronic problem. The current episode started more than 1 year ago. The problem has been gradually improving since onset. Pertinent negatives include no anxiety, blurred vision, chest pain, headaches, neck pain, palpitations, peripheral edema or shortness of breath. Risk factors for coronary artery disease include smoking/tobacco exposure and male gender. Past treatments include calcium channel blockers, diuretics and beta blockers. The current treatment provides moderate improvement. There are no compliance problems. Back Pain  This is a chronic problem. The current episode started more than 1 year ago (1 year). The problem occurs constantly. The problem is unchanged. The pain is present in the lumbar spine. The quality of the pain is described as aching. The pain does not radiate. The pain is moderate. The symptoms are aggravated by bending and standing. Pertinent negatives include no bladder incontinence, bowel incontinence, chest pain, headaches, leg pain, numbness or pelvic pain. He has tried NSAIDs and walking for the symptoms. The treatment provided mild relief. Vitamin D deficiency  Vit D level was 16.5 in March. Is taking Vitamin D supplement as prescribed.       Allergies   Allergen Reactions    Pcn [Penicillins] Anaphylaxis and Swelling     Outpatient Medications Marked as Taking for the 5/10/21 encounter (Office Visit) with MARGARITA Anthony   Medication Sig Dispense Refill    amLODIPine (NORVASC) 10 MG tablet TAKE 1 TABLET BY MOUTH EVERY DAY 90 tablet 0    aspirin 81 MG chewable tablet Take 1 tablet by mouth daily 30 tablet 2    hydroCHLOROthiazide (HYDRODIURIL) 25 MG tablet Take 1 tablet by mouth every morning 90 tablet 0    labetalol (NORMODYNE) 300 MG tablet Take 1 tablet by mouth daily 90 tablet 0    tadalafil (CIALIS) 5 MG tablet Take 1 tablet by mouth daily as needed for Erectile Dysfunction Take one tablet daily as needed for erectile dysfunction. 30 tablet 2    varenicline (CHANTIX CONTINUING MONTH ROSARIO) 1 MG tablet Take 1 tablet by mouth 2 times daily 60 tablet 1    vitamin D (CHOLECALCIFEROL) 50 MCG (2000 UT) TABS tablet Take 1 tablet by mouth daily 90 tablet 0    acetaminophen (TYLENOL) 325 MG tablet Take 650 mg by mouth as needed         Health Maintenance Due   Topic    COVID-19 Vaccine (1)    Shingles Vaccine (1 of 2)    Colon cancer screen colonoscopy      Past Medical History:   Diagnosis Date    [facility]neur[facility]     Arthritis     Cerebral artery occlusion with cerebral infarction ([facility]     [address]    Hypertension     Smokes cigarettes     Subdural hematoma (HCC)      Past Surgical History:   Procedure Laterality Date    EXCISION LESION HAND / FINGER Left 3/28/2019    LEFT HAND MASS EXCISION performed by James Alfaro MD at [facility] Left 03/28/2019    Left thenar eminence Mass    KNEE SURGERY      knee cap repair at age 18    [address] ARTHROSCOPY Left     at [facility] History     Tobacco History     Smoking Status  Current Every Day Smoker Smoking Frequency  0.5 packs/day for 40 years (20 pk yrs) Smoking Tobacco Type  Cigarettes    Smokeless Tobacco Use  Never Used    Tobacco Comment  Taking Chantix          Alcohol History     Alcohol Use Status  No          Drug Use     Drug Use Status  No          Sexual Activity     Sexually Active  Yes Partners  Female Comment  SINGLE            Review of Systems   Constitutional: Negative. HENT: Negative. Eyes: Negative. Negative for blurred vision.    Respiratory: Negative for chest tightness and shortness of breath. Down to 1/2 pack cigarettes a day. Cardiovascular: Negative for chest pain, palpitations and leg swelling. Gastrointestinal: Negative. Negative for bowel incontinence. Genitourinary: Negative. Negative for bladder incontinence and pelvic pain. Musculoskeletal: Positive for back pain. Negative for neck pain. Skin: Negative. Neurological: Negative for numbness and headaches. Psychiatric/Behavioral: Negative. Vitals:    05/10/21 1431   BP: 135/70   Site: Left Upper Arm   Position: Sitting   Cuff Size: Medium Adult   Pulse: 74   SpO2: 98%   Weight: 158 lb 12.8 oz (72 kg)   Height: 5' 2\" (1.575 m)     Physical Exam  Constitutional:       General: He is not in acute distress. Appearance: Normal appearance. He is not ill-appearing, toxic-appearing or diaphoretic. HENT:      Head: Normocephalic. Nose: Nose normal.      Mouth/Throat:      Mouth: Mucous membranes are moist.   Eyes:      Conjunctiva/sclera: Conjunctivae normal.   Neck:      Musculoskeletal: Normal range of motion and neck supple. No neck rigidity or muscular tenderness. Vascular: No carotid bruit. Cardiovascular:      Rate and Rhythm: Normal rate and regular rhythm. Pulses: Normal pulses. Heart sounds: Normal heart sounds. No murmur. No friction rub. No gallop. Pulmonary:      Effort: Pulmonary effort is normal. No respiratory distress. Breath sounds: Normal breath sounds. No stridor. No wheezing, rhonchi or rales. Abdominal:      General: Bowel sounds are normal.      Palpations: Abdomen is soft. Tenderness: There is no abdominal tenderness. Musculoskeletal: Normal range of motion. Lumbar back: He exhibits pain. He exhibits normal range of motion. Arms:       Right lower leg: No edema. Left lower leg: No edema. Lymphadenopathy:      Cervical: No cervical adenopathy. Skin:     General: Skin is warm and dry.    Neurological: Mental Status: He is alert and oriented to person, place, and time. Psychiatric:         Mood and Affect: Mood normal.         Behavior: Behavior normal.           Assessment/Plan     1. Essential hypertension  - amLODIPine (NORVASC) 10 MG tablet; TAKE 1 TABLET BY MOUTH EVERY DAY  Dispense: 90 tablet; Refill: 0  - aspirin 81 MG chewable tablet; Take 1 tablet by mouth daily  Dispense: 30 tablet; Refill: 2  - hydroCHLOROthiazide (HYDRODIURIL) 25 MG tablet; Take 1 tablet by mouth every morning  Dispense: 90 tablet; Refill: 0  - labetalol (NORMODYNE) 300 MG tablet; Take 1 tablet by mouth daily  Dispense: 90 tablet; Refill: 0    2. Chronic right-sided low back pain without sciatica  - XR LUMBAR SPINE (2-3 VIEWS); Future    3. Smokes cigarettes  - varenicline (CHANTIX CONTINUING MONTH ROSARIO) 1 MG tablet; Take 1 tablet by mouth 2 times daily  Dispense: 60 tablet; Refill: 1    4. Other male erectile dysfunction  - tadalafil (CIALIS) 5 MG tablet; Take 1 tablet by mouth daily as needed for Erectile Dysfunction Take one tablet daily as needed for erectile dysfunction. Dispense: 30 tablet; Refill: 2    5. Vitamin D deficiency  - vitamin D (CHOLECALCIFEROL) 50 MCG (2000 UT) TABS tablet; Take 1 tablet by mouth daily  Dispense: 90 tablet; Refill: 0      Discussed medications with patient, who voiced understanding of their use and indications. All questions answered. Pt is advised to call if symptoms worsen or do not improve.

## 2021-05-12 ENCOUNTER — HOSPITAL ENCOUNTER (OUTPATIENT)
Age: 61
Discharge: HOME | End: 2021-05-12
Payer: COMMERCIAL

## 2021-05-12 ENCOUNTER — HOSPITAL ENCOUNTER (OUTPATIENT)
Dept: GENERAL RADIOLOGY | Age: 61
Discharge: HOME | End: 2021-05-12
Payer: COMMERCIAL

## 2021-05-12 DIAGNOSIS — G89.29 CHRONIC RIGHT-SIDED LOW BACK PAIN WITHOUT SCIATICA: ICD-10-CM

## 2021-05-12 DIAGNOSIS — M54.50 CHRONIC RIGHT-SIDED LOW BACK PAIN WITHOUT SCIATICA: ICD-10-CM

## 2021-05-12 PROCEDURE — 72100 X-RAY EXAM L-S SPINE 2/3 VWS: CPT

## 2021-05-27 ENCOUNTER — TELEPHONE (OUTPATIENT)
Dept: ADMINISTRATIVE | Age: 61
End: 2021-05-27

## 2021-07-12 ENCOUNTER — TELEPHONE (OUTPATIENT)
Dept: INTERNAL MEDICINE CLINIC | Age: 61
End: 2021-07-12

## 2021-07-19 ENCOUNTER — TELEPHONE (OUTPATIENT)
Dept: ORTHOPEDIC SURGERY | Age: 61
End: 2021-07-19

## 2021-09-07 ENCOUNTER — OFFICE VISIT (OUTPATIENT)
Dept: INTERNAL MEDICINE CLINIC | Age: 61
End: 2021-09-07
Payer: COMMERCIAL

## 2021-09-07 VITALS
HEIGHT: 62 IN | OXYGEN SATURATION: 95 % | DIASTOLIC BLOOD PRESSURE: 80 MMHG | SYSTOLIC BLOOD PRESSURE: 120 MMHG | WEIGHT: 152 LBS | BODY MASS INDEX: 27.97 KG/M2 | HEART RATE: 60 BPM

## 2021-09-07 DIAGNOSIS — J43.8 OTHER EMPHYSEMA (HCC): ICD-10-CM

## 2021-09-07 DIAGNOSIS — S06.5XAA SUBDURAL HEMATOMA: ICD-10-CM

## 2021-09-07 DIAGNOSIS — N52.8 OTHER MALE ERECTILE DYSFUNCTION: ICD-10-CM

## 2021-09-07 DIAGNOSIS — E55.9 VITAMIN D DEFICIENCY: ICD-10-CM

## 2021-09-07 DIAGNOSIS — F17.210 SMOKES CIGARETTES: ICD-10-CM

## 2021-09-07 DIAGNOSIS — Z83.3 FAMILY HISTORY OF DIABETES MELLITUS (DM): ICD-10-CM

## 2021-09-07 DIAGNOSIS — M25.562 ACUTE PAIN OF LEFT KNEE: Primary | ICD-10-CM

## 2021-09-07 DIAGNOSIS — I10 ESSENTIAL HYPERTENSION: ICD-10-CM

## 2021-09-07 PROCEDURE — G8926 SPIRO NO PERF OR DOC: HCPCS | Performed by: INTERNAL MEDICINE

## 2021-09-07 PROCEDURE — G8419 CALC BMI OUT NRM PARAM NOF/U: HCPCS | Performed by: INTERNAL MEDICINE

## 2021-09-07 PROCEDURE — 99214 OFFICE O/P EST MOD 30 MIN: CPT | Performed by: INTERNAL MEDICINE

## 2021-09-07 PROCEDURE — 3023F SPIROM DOC REV: CPT | Performed by: INTERNAL MEDICINE

## 2021-09-07 PROCEDURE — G8427 DOCREV CUR MEDS BY ELIG CLIN: HCPCS | Performed by: INTERNAL MEDICINE

## 2021-09-07 PROCEDURE — 3017F COLORECTAL CA SCREEN DOC REV: CPT | Performed by: INTERNAL MEDICINE

## 2021-09-07 PROCEDURE — 99406 BEHAV CHNG SMOKING 3-10 MIN: CPT | Performed by: INTERNAL MEDICINE

## 2021-09-07 PROCEDURE — 4004F PT TOBACCO SCREEN RCVD TLK: CPT | Performed by: INTERNAL MEDICINE

## 2021-09-07 RX ORDER — AMLODIPINE BESYLATE 10 MG/1
TABLET ORAL
Qty: 90 TABLET | Refills: 0 | Status: SHIPPED | OUTPATIENT
Start: 2021-09-07 | End: 2022-01-05 | Stop reason: SDUPTHER

## 2021-09-07 RX ORDER — AMLODIPINE BESYLATE 10 MG/1
TABLET ORAL
Qty: 90 TABLET | Refills: 0 | Status: CANCELLED | OUTPATIENT
Start: 2021-09-07

## 2021-09-07 RX ORDER — LABETALOL 300 MG/1
300 TABLET, FILM COATED ORAL DAILY
Qty: 90 TABLET | Refills: 0 | Status: SHIPPED | OUTPATIENT
Start: 2021-09-07 | End: 2022-01-05 | Stop reason: SDUPTHER

## 2021-09-07 RX ORDER — LABETALOL 300 MG/1
300 TABLET, FILM COATED ORAL DAILY
Qty: 90 TABLET | Refills: 0 | Status: CANCELLED | OUTPATIENT
Start: 2021-09-07

## 2021-09-07 RX ORDER — CHOLECALCIFEROL (VITAMIN D3) 50 MCG
2000 TABLET ORAL DAILY
Qty: 90 TABLET | Refills: 0 | Status: SHIPPED | OUTPATIENT
Start: 2021-09-07 | End: 2022-01-05 | Stop reason: SDUPTHER

## 2021-09-07 RX ORDER — MELOXICAM 7.5 MG/1
7.5 TABLET ORAL DAILY PRN
Qty: 30 TABLET | Refills: 0 | OUTPATIENT
Start: 2021-09-07 | End: 2021-12-10

## 2021-09-07 RX ORDER — HYDROCHLOROTHIAZIDE 25 MG/1
25 TABLET ORAL EVERY MORNING
Qty: 90 TABLET | Refills: 0 | Status: SHIPPED | OUTPATIENT
Start: 2021-09-07 | End: 2022-01-05 | Stop reason: SDUPTHER

## 2021-09-07 RX ORDER — VARENICLINE TARTRATE 1 MG/1
1 TABLET, FILM COATED ORAL 2 TIMES DAILY
Qty: 60 TABLET | Refills: 1 | Status: CANCELLED | OUTPATIENT
Start: 2021-09-07

## 2021-09-07 RX ORDER — ASPIRIN 81 MG/1
81 TABLET, CHEWABLE ORAL DAILY
Qty: 30 TABLET | Refills: 2 | Status: CANCELLED | OUTPATIENT
Start: 2021-09-07

## 2021-09-07 RX ORDER — TADALAFIL 5 MG/1
5 TABLET ORAL DAILY PRN
Qty: 30 TABLET | Refills: 2 | Status: CANCELLED | OUTPATIENT
Start: 2021-09-07

## 2021-09-07 RX ORDER — HYDROCHLOROTHIAZIDE 25 MG/1
25 TABLET ORAL EVERY MORNING
Qty: 90 TABLET | Refills: 0 | Status: CANCELLED | OUTPATIENT
Start: 2021-09-07

## 2021-09-07 RX ORDER — CHOLECALCIFEROL (VITAMIN D3) 50 MCG
2000 TABLET ORAL DAILY
Qty: 90 TABLET | Refills: 0 | Status: CANCELLED | OUTPATIENT
Start: 2021-09-07

## 2021-09-07 RX ORDER — TADALAFIL 5 MG/1
5 TABLET ORAL DAILY PRN
Qty: 30 TABLET | Refills: 2 | Status: SHIPPED | OUTPATIENT
Start: 2021-09-07 | End: 2022-01-05 | Stop reason: SDUPTHER

## 2021-09-07 ASSESSMENT — PATIENT HEALTH QUESTIONNAIRE - PHQ9
SUM OF ALL RESPONSES TO PHQ QUESTIONS 1-9: 0
SUM OF ALL RESPONSES TO PHQ QUESTIONS 1-9: 0
1. LITTLE INTEREST OR PLEASURE IN DOING THINGS: 0
SUM OF ALL RESPONSES TO PHQ QUESTIONS 1-9: 0
SUM OF ALL RESPONSES TO PHQ9 QUESTIONS 1 & 2: 0
2. FEELING DOWN, DEPRESSED OR HOPELESS: 0

## 2021-09-07 NOTE — PROGRESS NOTES
Take 650 mg by mouth as needed Yes Historical Provider, MD       ROS: COMPREHENSIVE ROS AS IN HX, REST -VE  History obtained from chart review and the patient       OBJECTIVE:   NURSING NOTE AND VITALS REVIEWED  /70 (Site: Left Upper Arm, Position: Sitting, Cuff Size: Medium Adult)   Pulse 54   Ht 5' 2\" (1.575 m)   Wt 152 lb (68.9 kg)   SpO2 95%   BMI 27.80 kg/m²     NO ACUTE DISTRESS    REPEAT BP: 120/80 (LT), NO ORTHOSTASIS     REPEAT PULSE: 60 - MANUAL    Body mass index is 27.8 kg/m². HEENT: NO PALLOR, ANICTERIC, PERRLA, EOMI, NO CONJUNCTIVAL ERYTHEMA,                 NO SINUS TENDERNESS  NECK:  SUPPLE, TRACHEA MIDLINE, NT, NO JVD, NO CB, NO LA, NO TM, NO STIFFNESS  CHEST: RESPY EFFORT NL, GOOD AE, NO W/R/C  HEART: S1S2+ REG, NO M/G/R  ABD: SOFT, NT, NO HSM, BS+  EXT: NO EDEMA, NT, PULSES +. SARA'S -VE  NEURO: ALERT AND ORIENTED X 3, NO MENINGEAL SIGNS, NO TREMORS, NL GAIT, NO FOCAL DEFICITS  PSYCH: FAIRLY GOOD AFFECT  BACK: NT, NO ROM, NO CVA TENDERNESS  KNEE: NO SWELLING, MILD TENDERNESS LT, + OCC PAN WITH MVT  LT, NO ROM, MILD CREPITUS - LT     PREVIOUS LABS REVIEWED AND D/W PT    ASSESSMENT / PLAN:     Diagnosis Orders   1. Acute pain of left knee  . COUNSELLED. ? OA. EXERCISES. ANALGESICS PRN. MONITOR. ADVISED MOBIC  7.5 MG DAILY  PRN  DEFERRED X RAY  HOME EXERCISES  MAKE CHANGES AS NEEDED. 2. Essential hypertension  COUNSELLED. CONTINUE MEDS. LOW NA+ / DASH DIET/ EXERCISE. MONITOR. GOAL </= 120/80  F/U LABS  MAKE CHANGES AS NEEDED. 3. Other emphysema ([address]  COUNSELLED. DEFERRED INHALER  ADVISED SMOKING CESSATION. MONITOR  MAKE CHANGES AS NEEDED. 4. Vitamin D deficiency  COUNSELLED. MONITOR ON VIT D SUPPLEMENT. MAKE CHANGES AS NEEDED. 5. Other male erectile dysfunction  COUNSELLED. MONITOR ON CIALIS - S/E D/W PT  MAKE CHANGES AS NEEDED. 6. Subdural hematoma (HCC)  COUNSELLED. NO NEW ISSUES  MONITOR. MAKE CHANGES AS NEEDED.        7. Smokes cigarettes  Smoking cessation was encouraged. Cessation techniques reviewed today. COUNSELLED. CESSATION ADVISED   MS TOBACCO USE CESSATION INTERMEDIATE 3-10 MINUTES ( MINS)  COMPLICATIONS OF TOBACCO USE INCLUDING COPD, CANCER, CAD D/W PT  PT VERBALIZED UNDERSTANDING  MAKE CHANGES AS NEEDED. 8. Family history of diabetes mellitus (DM)  COUNSELLED. ADVISED ON DIET / EXERCISE  ADVISED RISK FACTOR MODIFICATION. F/U LABS TO [facility]  MAKE CHANGES AS NEEDED. Ina Leone                          MEDICATION SIDE EFFECTS D/W 3 MONTHS / PRN    RETURN TO CLINIC WITHIN 3 MONTHS / PRN    FOLLOW UP FOR LABS

## 2021-12-08 ENCOUNTER — HOSPITAL ENCOUNTER (EMERGENCY)
Age: 61
Discharge: HOME | End: 2021-12-08
Payer: COMMERCIAL

## 2021-12-08 VITALS
DIASTOLIC BLOOD PRESSURE: 78 MMHG | WEIGHT: 149.25 LBS | TEMPERATURE: 100.5 F | HEART RATE: 70 BPM | OXYGEN SATURATION: 96 % | SYSTOLIC BLOOD PRESSURE: 124 MMHG | RESPIRATION RATE: 16 BRPM | BODY MASS INDEX: 27.3 KG/M2

## 2021-12-08 DIAGNOSIS — U07.1 COVID-19: Primary | ICD-10-CM

## 2021-12-08 LAB — SARS-COV-2, NAAT: DETECTED

## 2021-12-08 PROCEDURE — 99285 EMERGENCY DEPT VISIT HI MDM: CPT

## 2021-12-08 PROCEDURE — 6370000000 HC RX 637 (ALT 250 FOR IP): Performed by: PHYSICIAN ASSISTANT

## 2021-12-08 PROCEDURE — 87635 SARS-COV-2 COVID-19 AMP PRB: CPT

## 2021-12-08 RX ORDER — ACETAMINOPHEN 500 MG
1000 TABLET ORAL ONCE
Status: COMPLETED | OUTPATIENT
Start: 2021-12-08 | End: 2021-12-08

## 2021-12-08 RX ADMIN — ACETAMINOPHEN 1000 MG: 500 TABLET ORAL at 18:09

## 2021-12-08 ASSESSMENT — PAIN - FUNCTIONAL ASSESSMENT
PAIN_FUNCTIONAL_ASSESSMENT: ACTIVITIES ARE NOT PREVENTED
PAIN_FUNCTIONAL_ASSESSMENT: 0-10

## 2021-12-08 ASSESSMENT — PAIN SCALES - GENERAL
PAINLEVEL_OUTOF10: 6

## 2021-12-08 ASSESSMENT — PAIN DESCRIPTION - FREQUENCY
FREQUENCY: CONTINUOUS
FREQUENCY: CONTINUOUS

## 2021-12-08 ASSESSMENT — PAIN DESCRIPTION - DESCRIPTORS
DESCRIPTORS: ACHING
DESCRIPTORS: ACHING

## 2021-12-08 ASSESSMENT — PAIN DESCRIPTION - PAIN TYPE
TYPE: ACUTE PAIN
TYPE: ACUTE PAIN

## 2021-12-08 ASSESSMENT — PAIN DESCRIPTION - LOCATION
LOCATION: GENERALIZED
LOCATION: GENERALIZED

## 2021-12-08 ASSESSMENT — PAIN DESCRIPTION - ONSET
ONSET: ON-GOING
ONSET: ON-GOING

## 2021-12-08 ASSESSMENT — PAIN DESCRIPTION - ORIENTATION: ORIENTATION: OTHER (COMMENT)

## 2021-12-08 ASSESSMENT — PAIN DESCRIPTION - PROGRESSION: CLINICAL_PROGRESSION: NOT CHANGED

## 2021-12-09 ENCOUNTER — APPOINTMENT (OUTPATIENT)
Dept: GENERAL RADIOLOGY | Age: 61
End: 2021-12-09
Payer: COMMERCIAL

## 2021-12-09 ENCOUNTER — CARE COORDINATION (OUTPATIENT)
Dept: CARE COORDINATION | Age: 61
End: 2021-12-09

## 2021-12-09 ENCOUNTER — HOSPITAL ENCOUNTER (EMERGENCY)
Age: 61
Discharge: HOME | End: 2021-12-10
Payer: COMMERCIAL

## 2021-12-09 VITALS
BODY MASS INDEX: 27.22 KG/M2 | WEIGHT: 148.81 LBS | RESPIRATION RATE: 16 BRPM | TEMPERATURE: 103.5 F | OXYGEN SATURATION: 93 % | DIASTOLIC BLOOD PRESSURE: 75 MMHG | HEART RATE: 98 BPM | SYSTOLIC BLOOD PRESSURE: 136 MMHG

## 2021-12-09 DIAGNOSIS — D69.6 THROMBOCYTOPENIA (HCC): ICD-10-CM

## 2021-12-09 DIAGNOSIS — U07.1 COVID: Primary | ICD-10-CM

## 2021-12-09 DIAGNOSIS — F17.200 TOBACCO DEPENDENCE: ICD-10-CM

## 2021-12-09 LAB
A/G RATIO: 1.3 (ref 1.1–2.2)
ALBUMIN SERPL-MCNC: 4.3 G/DL (ref 3.4–5)
ALP BLD-CCNC: 71 U/L (ref 40–129)
ALT SERPL-CCNC: 12 U/L (ref 10–40)
ANION GAP SERPL CALCULATED.3IONS-SCNC: 11 MMOL/L (ref 3–16)
AST SERPL-CCNC: 41 U/L (ref 15–37)
BASOPHILS ABSOLUTE: 0 K/UL (ref 0–0.2)
BASOPHILS RELATIVE PERCENT: 0.4 %
BILIRUB SERPL-MCNC: 0.5 MG/DL (ref 0–1)
BUN BLDV-MCNC: 15 MG/DL (ref 7–20)
CALCIUM SERPL-MCNC: 8.8 MG/DL (ref 8.3–10.6)
CHLORIDE BLD-SCNC: 97 MMOL/L (ref 99–110)
CO2: 24 MMOL/L (ref 21–32)
CREAT SERPL-MCNC: 1 MG/DL (ref 0.9–1.3)
EOSINOPHILS ABSOLUTE: 0 K/UL (ref 0–0.6)
EOSINOPHILS RELATIVE PERCENT: 0 %
GFR AFRICAN AMERICAN: >60
GFR NON-AFRICAN AMERICAN: >60
GLUCOSE BLD-MCNC: 116 MG/DL (ref 70–99)
HCT VFR BLD CALC: 37.8 % (ref 40.5–52.5)
HEMOGLOBIN: 12.2 G/DL (ref 13.5–17.5)
LYMPHOCYTES ABSOLUTE: 1.2 K/UL (ref 1–5.1)
LYMPHOCYTES RELATIVE PERCENT: 24.4 %
MCH RBC QN AUTO: 28.7 PG (ref 26–34)
MCHC RBC AUTO-ENTMCNC: 32.2 G/DL (ref 31–36)
MCV RBC AUTO: 89.2 FL (ref 80–100)
MONOCYTES ABSOLUTE: 0.4 K/UL (ref 0–1.3)
MONOCYTES RELATIVE PERCENT: 7.7 %
NEUTROPHILS ABSOLUTE: 3.4 K/UL (ref 1.7–7.7)
NEUTROPHILS RELATIVE PERCENT: 67.5 %
PDW BLD-RTO: 14.5 % (ref 12.4–15.4)
PLATELET # BLD: 105 K/UL (ref 135–450)
PLATELET SLIDE REVIEW: ABNORMAL
PMV BLD AUTO: 10.7 FL (ref 5–10.5)
POTASSIUM REFLEX MAGNESIUM: 3.7 MMOL/L (ref 3.5–5.1)
RBC # BLD: 4.23 M/UL (ref 4.2–5.9)
SLIDE REVIEW: ABNORMAL
SODIUM BLD-SCNC: 132 MMOL/L (ref 136–145)
TOTAL PROTEIN: 7.6 G/DL (ref 6.4–8.2)
WBC # BLD: 5.1 K/UL (ref 4–11)

## 2021-12-09 PROCEDURE — 85025 COMPLETE CBC W/AUTO DIFF WBC: CPT

## 2021-12-09 PROCEDURE — 36415 COLL VENOUS BLD VENIPUNCTURE: CPT

## 2021-12-09 PROCEDURE — 80053 COMPREHEN METABOLIC PANEL: CPT

## 2021-12-09 PROCEDURE — 99284 EMERGENCY DEPT VISIT MOD MDM: CPT

## 2021-12-09 PROCEDURE — 71046 X-RAY EXAM CHEST 2 VIEWS: CPT

## 2021-12-09 PROCEDURE — 96365 THER/PROPH/DIAG IV INF INIT: CPT

## 2021-12-09 RX ORDER — IBUPROFEN 400 MG/1
400 TABLET ORAL ONCE
Status: COMPLETED | OUTPATIENT
Start: 2021-12-09 | End: 2021-12-10

## 2021-12-09 RX ORDER — ACETAMINOPHEN 500 MG
1000 TABLET ORAL ONCE
Status: COMPLETED | OUTPATIENT
Start: 2021-12-09 | End: 2021-12-10

## 2021-12-09 ASSESSMENT — ENCOUNTER SYMPTOMS
COUGH: 1
SHORTNESS OF BREATH: 1
ABDOMINAL PAIN: 0

## 2021-12-09 ASSESSMENT — PAIN SCALES - GENERAL: PAINLEVEL_OUTOF10: 0

## 2021-12-10 ENCOUNTER — CARE COORDINATION (OUTPATIENT)
Dept: CARE COORDINATION | Age: 61
End: 2021-12-10

## 2021-12-10 PROCEDURE — 2580000003 HC RX 258: Performed by: NURSE PRACTITIONER

## 2021-12-10 PROCEDURE — 6360000002 HC RX W HCPCS: Performed by: NURSE PRACTITIONER

## 2021-12-10 PROCEDURE — 96365 THER/PROPH/DIAG IV INF INIT: CPT

## 2021-12-10 PROCEDURE — 6370000000 HC RX 637 (ALT 250 FOR IP): Performed by: NURSE PRACTITIONER

## 2021-12-10 RX ORDER — GUAIFENESIN/DEXTROMETHORPHAN 100-10MG/5
5 SYRUP ORAL 3 TIMES DAILY PRN
Qty: 120 ML | Refills: 0 | Status: SHIPPED | OUTPATIENT
Start: 2021-12-10 | End: 2021-12-20

## 2021-12-10 RX ORDER — ALBUTEROL SULFATE 90 UG/1
2 AEROSOL, METERED RESPIRATORY (INHALATION) 4 TIMES DAILY PRN
Qty: 18 G | Refills: 0 | Status: SHIPPED | OUTPATIENT
Start: 2021-12-10 | End: 2022-01-05 | Stop reason: SDUPTHER

## 2021-12-10 RX ORDER — PREDNISONE 20 MG/1
TABLET ORAL
Qty: 18 TABLET | Refills: 0 | Status: SHIPPED | OUTPATIENT
Start: 2021-12-10 | End: 2021-12-20

## 2021-12-10 RX ADMIN — ACETAMINOPHEN 1000 MG: 500 TABLET ORAL at 00:23

## 2021-12-10 RX ADMIN — CASIRIVIMAB AND IMDEVIMAB: 600; 600 INJECTION, SOLUTION, CONCENTRATE INTRAVENOUS at 00:24

## 2021-12-10 RX ADMIN — IBUPROFEN 400 MG: 400 TABLET, FILM COATED ORAL at 00:23

## 2021-12-10 ASSESSMENT — PAIN SCALES - GENERAL: PAINLEVEL_OUTOF10: 8

## 2021-12-10 NOTE — ED NOTES
Pt presents to ED for shortness of breath and fever. Pt has at home pulse ox and states it was reading 87% at home. Pt was Ambulatory in triage area, SpO2 remains 93% on room air. No signs of acute distress noted.       Radu Skinner RN  12/09/21 [address] [place], RN  12/09/21 [id number]

## 2021-12-10 NOTE — ED PROVIDER NOTES
[facility] Emergency Department    CHIEF COMPLAINT  Generalized Body Aches (Pt reports off and on body aches and cough \"for a couple of days\". Pt alert and oriented with no signs of distress noted.) and Cough      SHARED SERVICE VISIT  Evaluated by ABDELRAHMAN. My supervising physician was available for consultation. HISTORY OF PRESENT ILLNESS  Ye Goins is a 63 y.o. male who presents to the ED complaining of generalized body aches and chills x3 days. Patient states that he is not having any chest pain or difficulty breathing. Denies any known fevers. No nausea vomiting or diarrhea. No belly pain. Patient said he has have a nonproductive cough however he does smoke cigarettes and this is not new. Patient was not vaccinated for COVID-19. Patient taking over-the-counter cold medicine with mild relief. Patient has no other complaint at this time. Patient is able to tolerate oral intake however does report a slight reduction in his appetite. No other complaints, modifying factors or associated symptoms. Nursing notes reviewed. Past Medical History:   Diagnosis Date    Aneurysm ([address]     Arthritis     Cerebral artery occlusion with cerebral infarction [facility])     [address] 2016    Hypertension     Smokes cigarettes     Subdural hematoma (HCC)      Past Surgical History:   Procedure Laterality Date    EXCISION LESION HAND / FINGER Left 3/28/2019    LEFT HAND MASS EXCISION performed by Elizabeth Rothman MD at [address] [place] [address] Left 03/28/2019    Left thenar eminence Mass    KNEE SURGERY      knee cap repair at age 18    [de-identified] SURGERY      [facility]     at [facility]     Family History   Problem Relation Age of Onset    High Blood Pressure Mother     Diabetes Mother     Other Father         SEIZURES    High Blood Pressure Father     Cancer Maternal Aunt         ?  TYPE     Social History     Socioeconomic History    Marital status: Single     Spouse name: Not on file    Number of children: 5    Years of education: Not on file    Highest education level: Not on file   Occupational History    Occupation: CONSTRUCTION   Tobacco Use    Smoking status: Current Every Day Smoker     Packs/day: 0.50     Years: 40.00     Pack years: 20.00     Types: Cigarettes    Smokeless tobacco: Never Used    Tobacco comment: Taking Chantix   Vaping Use    Vaping Use: Never used   Substance and Sexual Activity    Alcohol use: No    Drug use: No    Sexual activity: Yes     Partners: Female     Comment: SINGLE   Other Topics Concern    Not on file   Social History Narrative    Live with girlfriend. Social Determinants of Health     Financial Resource Strain: Low Risk     Difficulty of Paying Living Expenses: Not hard at all   Food Insecurity: No Food Insecurity    Worried About Running Out of Food in the Last Year: Never true    Peter of Food in the Last Year: Never true   Transportation Needs:     Lack of Transportation (Medical): Not on file    Lack of Transportation (Non-Medical):  Not on file   Physical Activity:     Days of Exercise per Week: Not on file    Minutes of Exercise per Session: Not on file   Stress:     Feeling of Stress : Not on file   Social Connections:     Frequency of Communication with Friends and Family: Not on file    Frequency of Social Gatherings with Friends and Family: Not on file    Attends Jainism Services: Not on file    Active Member of [address] [place] [address] or Organizations: Not on file    Attends Club or Organization Meetings: Not on file    Marital Status: Not on file   Intimate Partner Violence:     Fear of Current or Ex-Partner: Not on file    Emotionally Abused: Not on file    Physically Abused: Not on file    Sexually Abused: Not on file   Housing Stability:     Unable to Pay for Housing in the Last Year: Not on file    Number of [place] in the Last Year: Not on file    Unstable Housing in the Last Year: Not on file     No current facility-administered medications for this encounter. Current Outpatient Medications   Medication Sig Dispense Refill    amLODIPine (NORVASC) 10 MG tablet TAKE 1 TABLET BY MOUTH EVERY DAY 90 tablet 0    hydroCHLOROthiazide (HYDRODIURIL) 25 MG tablet Take 1 tablet by mouth every morning 90 tablet 0    labetalol (NORMODYNE) 300 MG tablet Take 1 tablet by mouth daily 90 tablet 0    tadalafil (CIALIS) 5 MG tablet Take 1 tablet by mouth daily as needed for Erectile Dysfunction Take one tablet daily as needed for erectile dysfunction. 30 tablet 2    vitamin D (CHOLECALCIFEROL) 50 MCG (2000 UT) TABS tablet Take 1 tablet by mouth daily 90 tablet 0    meloxicam (MOBIC) 7.5 MG tablet Take 1 tablet by mouth daily as needed for Pain 30 tablet 0    aspirin 81 MG chewable tablet Take 1 tablet by mouth daily 30 tablet 2    varenicline (CHANTIX CONTINUING MONTH ROSARIO) 1 MG tablet Take 1 tablet by mouth 2 times daily 60 tablet 1    acetaminophen (TYLENOL) 325 MG tablet Take 650 mg by mouth as needed       Allergies   Allergen Reactions    Pcn [Penicillins] Anaphylaxis and Swelling       REVIEW OF SYSTEMS  7 systems reviewed, pertinent positives per HPI otherwise noted to be negative    PHYSICAL EXAM  /78   Pulse 70   Temp 100.5 °F (38.1 °C) (Temporal)   Resp 16   Wt 149 lb 4 oz (67.7 kg)   SpO2 96%   BMI 27.30 kg/m²   GENERAL APPEARANCE: Awake and alert. Cooperative. HEAD: Normocephalic. Atraumatic. EYES: EOM grossly intact. ENT: Mucous membranes are moist.   NECK: Supple. HEART: RRR. No murmurs. LUNGS: Respirations unlabored. CTAB. Good air exchange. Speaking comfortably in full sentences. No wheezing appreciated. EXTREMITIES: No peripheral edema. Moves all extremities equally. SKIN: Warm and dry. No acute rashes. NEUROLOGICAL: Alert and oriented. No gross facial drooping. PSYCHIATRIC: Normal mood and affect.     RADIOLOGY  No orders to display         LABS  Labs Reviewed   COVID-19, RAPID - Abnormal; Notable for the following components:       Result Value    SARS-CoV-2, NAAT DETECTED (*)     All other components within normal limits    Narrative:     Performed at:                  [address] [place] [address]   Phone [phone]       PROCEDURES  Unless otherwise noted below, none  Procedures    MDM  This is a 65-year-old male presents emergency department for evaluation of generalized body aches and chills that been ongoing for the past few days. Patient otherwise is asymptomatic with no difficulty breathing or chest pain. On arrival ED patient's vitals were within normal limits aside from a temp of 100.5. Patient is nontachycardic and has oxygen saturation of 96% on room air. On auscultation patient's lungs are clear bilaterally with no wheezing or rhonchi appreciated. Patient was tested for COVID-19 and was found to be positive. Recommended patient obtaining a pulse oximetry device and checking his saturations 3 times a day and return to emergency department if he is unable to keep it greater than 90%. Recommended taking Tylenol as directed for body aches and pains and increasing fluid hydration with water. I did provide patient with a DME order as well as a physical paper prescription for him to obtain a pulse oximetry device at the medical supply store. At this time given his lack of complaints reassuring physical exam vital signs I do not find the need for any additional imaging or lab work at this time. In accordance with  USACS guidelines for risk of acute decompensation with next 48 hours patient is considered low risk and safe for discharge home with follow-up with primary care provider. Patient was agreeable this plan ultimately discharged home to return if symptoms worsen or change. DISPOSITION  Patient was discharged to home in good condition.     CLINICAL IMPRESSION  1. COVID-19 Guadalupe OMKAR Rolon  12/09/21 1911

## 2021-12-10 NOTE — ED NOTES
Bed: D-49  Expected date: 12/9/21  Expected time: 10:22 PM  Means of arrival: Walk In  Comments:  Low oxygen      Ramy Vega RN  12/09/21 2202

## 2021-12-10 NOTE — CARE COORDINATION
Patient contacted regarding COVID-19 diagnosis. Discussed COVID-19 related testing which was available at this time. Test results were positive. Patient informed of results, if available? Yes. Ambulatory Care Manager contacted the patient by telephone to perform post discharge assessment. Call within 2 business days of discharge: Yes. Verified name and  with patient as identifiers. Provided introduction to self, and explanation of the CTN/ACM role, and reason for call due to risk factors for infection and/or exposure to COVID-19. Symptoms reviewed with patient who verbalized the following symptoms: fever, fatigue, pain or aching joints, cough, shortness of breath and chills or shaking. Due to no new or worsening symptoms encounter was not routed to provider for escalation. Discussed follow-up appointments. If no appointment was previously scheduled, appointment scheduling offered: Yes. [facility] follow up appointment(s):   Future Appointments   Date Time Provider Andi Ceja   2022  3:40 PM Cathryn Lundberg MD [facility] [place] - DYD     Non-[facility] follow up appointment(s): none    Non-face-to-face services provided:  Obtained and reviewed discharge summary and/or continuity of care documents  Education of patient/family/caregiver/guardian to support self-management-for COVID 19. Advance Care Planning:   Does patient have an Advance Directive:  not on file; education provided. none    Educated patient about risk for severe COVID-19 due to risk factors according to CDC guidelines. ACM reviewed discharge instructions, medical action plan and red flag symptoms with the patient who verbalized understanding. Discussed COVID vaccination status: Yes. Education provided on COVID-19 vaccination as appropriate. Discussed exposure protocols and quarantine with CDC Guidelines.  Patient was given an opportunity to verbalize any questions and concerns and agrees to contact ACM or health care provider for questions related to their healthcare. Reviewed and educated patient on any new and changed medications related to discharge diagnosis     Was patient discharged with a pulse oximeter? Yes Discussed and confirmed pulse oximeter discharge instructions and when to notify provider or seek emergency care. ACM provided contact information. Plan for follow-up call in 3-5 days based on severity of symptoms and risk factors.

## 2021-12-10 NOTE — ED NOTES
Pt discharged back home, reviewed discharged instructions with pt follow up care , pain control and return precautions discussed , pt verbalized understanding.  Pt ambulated out of the department with steady gait          Cynthia Jackson RN  12/10/21 0174

## 2021-12-10 NOTE — ED PROVIDER NOTES
[address] [facility]  [address] [place] [facility] [address]  Dept: [phone]  Loc: [phone]  eMERGENCYdEPARTMENT eNCOUnter      Pt Name: Sridhar Scott  MRN: [id number]  Hemagfsakshi 1960  Date of evaluation: 12/9/2021  Provider:MARGARITA Machado CNP     Independently evaluated by the advanced practice provider    Proper PPE was donned to care for this patient: N95 mask, surgical mask, face shield and gloves    Patient was in proper isolation    CHIEF COMPLAINT       Chief Complaint   Patient presents with    Shortness of Breath     dx with covid yesterday/ reports  SpO2 87% at home       [address]  (Location/Symptom, Timing/Onset, Context/Setting, Quality, Duration,Modifying Factors, Severity.)   Sridhar Scott is a 63 y.o. male who presents to the emergency department past medical history: CVA, HTN, tobacco smoker, arthritis, aneurysm, subdural hematoma    No Covid vaccination. Patient reports that 3 or 4 days ago he became symptomatic with Covid to include fever, cough, chills, body ache. He is unsure of where he got the exposure from. Denies chest pain. Positive shortness of breath. Positive cough that is nonproductive. Was seen yesterday in the emergency department was discharged home. He states he took his pulse oximetry while he was resting and he had one reading of 87% and he felt like he needed to be reevaluated. Last had Tylenol around 4 PM today. Lives with his girlfriend. She is not ill. She is Covid vaccinated      Nursing Notes were reviewedand agreed with or any disagreements were addressed in the HPI. REVIEW OF SYSTEMS    (2-9 systems for level 4, 10 or more for level 5)     Review of Systems   Constitutional: Positive for activity change, appetite change, chills, fatigue and fever. HENT: Negative. Respiratory: Positive for cough and shortness of breath. Gastrointestinal: Negative for abdominal pain. Genitourinary: Negative. Musculoskeletal: Positive for arthralgias. Skin: Negative. Neurological: Negative. Hematological: Negative. Except as noted above the remainder of the review of systems was reviewed and negative. PAST MEDICAL HISTORY         Diagnosis Date    [address]     Arthritis     Cerebral artery occlusion with cerebral infarction [facility])      [address]    Hypertension     Smokes cigarettes     Subdural hematoma ([facility]        SURGICAL HISTORY           Procedure Laterality Date    EXCISION LESION HAND / FINGER Left 3/28/2019    LEFT HAND MASS EXCISION performed by Abel Urbano MD at [address] Left 2019    Left thenar eminence Mass    KNEE SURGERY      knee cap repair at age 18    [de-identified] SURGERY      SHOULDER ARTHROSCOPY Left     at [address]     [unfilled]    ALLERGIES     Pcn [penicillins]    FAMILY HISTORY           Problem Relation Age of Onset    High Blood Pressure Mother     Diabetes Mother     Other Father         SEIZURES    High Blood Pressure Father     Cancer Maternal Aunt         ? TYPE     Family Status   Relation Name Status    Mother      Father  Alive   Chloé Perry  (Not Specified)        SOCIAL HISTORY      reports that he has been smoking cigarettes. He has a 20.00 pack-year smoking history. He has never used smokeless tobacco. He reports that he does not drink alcohol and does not use drugs. PHYSICAL EXAM    (up to 7 for level 4, 8 or more for level 5)     ED Triage Vitals [21 2223]   Enc Vitals Group      /75      Pulse 98      Resp 16      Temp 103.5 °F (39.7 °C)      Temp Source Oral      SpO2 93 %      Weight 148 lb 13 oz (67.5 kg)      Height       Head Circumference       Peak Flow       Pain Score       Pain Loc       Pain Edu? Excl. in [address]? Physical Exam  Vitals and nursing note reviewed.    Constitutional: General: He is not in acute distress. Appearance: He is well-developed. He is not toxic-appearing or diaphoretic. HENT:      Head: Normocephalic. Mouth/Throat:      Mouth: Mucous membranes are moist.      Pharynx: Oropharynx is clear. Eyes:      Pupils: Pupils are equal, round, and reactive to light. Cardiovascular:      Rate and Rhythm: Normal rate and regular rhythm. Pulmonary:      Effort: Pulmonary effort is normal.      Breath sounds: Normal breath sounds. Comments: No wheezing rales or rhonchi. No hypoxia room air: 93%. No tachypnea. Speaks in full sentences. Respirations are easy regular nonlabored  Skin:     General: Skin is warm and dry. Capillary Refill: Capillary refill takes less than 2 seconds. Neurological:      General: No focal deficit present. Mental Status: He is alert. Psychiatric:         Mood and Affect: Mood normal.         Behavior: Behavior normal.           DIAGNOSTIC RESULTS     EKG: All EKG's are interpreted by the Emergency Department Physician who either signs or Co-signs this chart in the absence of a cardiologist.    RADIOLOGY:   Non-plain film images such as CT, Ultrasound and MRI are read by the radiologist. Plain radiographic images are visualized and preliminarilyinterpreted by the emergency physician with the below findings:    Interpretation per the Radiologist below,if available at the time of this note:    XR CHEST (2 VW)   Final Result   Chronic changes of COPD with no acute finding.                LABS:  Labs Reviewed   CBC WITH AUTO DIFFERENTIAL - Abnormal; Notable for the following components:       Result Value    Hemoglobin 12.2 (*)     Hematocrit 37.8 (*)     Platelets 033 (*)     MPV 10.7 (*)     All other components within normal limits    Narrative:     Performed at:  [place] [address] [place], [place] [address]   Phone [phone]   COMPREHENSIVE METABOLIC PANEL W/ REFLEX TO MG FOR LOW K - Abnormal; Notable for the following components:    Sodium 132 (*)     Chloride 97 (*)     Glucose 116 (*)     AST 41 (*)     All other components within normal limits    Narrative:     Performed at:  [address] [facility] [address]   Phone [phone]       All other labs were within normal range or not returned as of this dictation. EMERGENCY DEPARTMENT COURSE and DIFFERENTIAL DIAGNOSIS/MDM:   Vitals:    Vitals:    12/09/21 2223   BP: 136/75   Pulse: 98   Resp: 16   Temp: 103.5 °F (39.7 °C)   TempSrc: Oral   SpO2: 93%   Weight: 148 lb 13 oz (67.5 kg)     Medications   acetaminophen (TYLENOL) tablet 1,000 mg (has no administration in time range)   ibuprofen (ADVIL;MOTRIN) tablet 400 mg (has no administration in time range)   casirivimab-imdevimab 1,200 mg in sodium chloride 0.9 % 110 mL IVPB (has no administration in time range)       MDM  Was seen and evaluated per myself. Dr. Yuri Dias was present available for consultation as needed. Patient was seen in the emergency department yesterday, his vital signs were stable, he received Tylenol to confirm diagnosis of Covid. He was discharged with follow-up instructions    Patient has known vascular disease, known stroke, known hypertension and tobacco smoker if he is high risk however he presents today fever of 103.5. However his blood pressure stable pulse is stable. No evidence of respiratory distress and room air pulse oximetry is 93%    He does meet inclusion criteria given his vascular disease and hypertension for Regeneron monoclonal antibody infusion. I did speak with him about this and he is in agreement. He will be ambulated if there is evidence of hypoxia then I will work towards admission for hypoxia secondary to Covid if there is no hypoxia and then we will pursue with monoclonal antibody infusion Regeneron. Pharmacy has been consulted.   Patient will be given Tylenol for febrile state.    Prior to my evaluation laboratory studies were obtained and are fairly unremarkable at this time. Chest x-ray was obtained today and indicating COPD changes but no evidence of groundglass opacities and no evidence of consolidation. Patient was ambulated in the emergency department and he maintained a room air saturation of 92%. I feel that this patient is a good candidate for the monoclonal antibody infusion, Regeneron and he could be discharged home to follow-up with his PCP. He will be encouraged to continue to monitor his pulse oximetry. His platelet count is low 105. He can follow-up on an outpatient basis with his primary care physician regarding this as well. And he is naturally encouraged to stop smoking. I will provide him a prescription for an inhaler, steroids, and cough medication as well. Current plan of care was discussed with the patient he is in agreement. He will be discharged 1 hour after the monoclonal antibody infusion providing that he does not have any kind of negative course or interaction. Patient is encouraged to resume his home medications. Be compliant with those. He is encouraged to perform incentive spirometry. CONSULTS:  IP CONSULT TO PHARMACY    PROCEDURES:  Procedures    FINAL IMPRESSION      1. COVID    2. Thrombocytopenia ([address]    3.  Tobacco dependence          DISPOSITION/PLAN   [unfilled]    PATIENT REFERRED TO:  Maribell Haines MD  [id number]  [address]  [address] [place] [address]    Schedule an appointment as soon as possible for a visit in 3 days      [facility] Emergency Department  [address] [facility]  [phone]    As needed, If symptoms worsen      DISCHARGE MEDICATIONS:  New Prescriptions    ALBUTEROL SULFATE HFA (VENTOLIN HFA) 108 (90 BASE) MCG/ACT INHALER    Inhale 2 puffs into the lungs 4 times daily as needed for Wheezing    GUAIFENESIN-DEXTROMETHORPHAN (ROBITUSSIN DM) 100-10 MG/5ML SYRUP    Take 5 mLs by mouth 3 times daily as needed for Cough    PREDNISONE (DELTASONE) 20 MG TABLET    3 tabs po qam for 3 days then 2 tabs qam for 3 days the 1 tab qam for 3 days       (Please note that portions of this note were completed with a voice recognition program.  Efforts were made to edit the dictations but occasionally words are mis-transcribed.)    MARGARITA Johnson CNP     This dictation was performed with a verbal recognition program (DRAGON) and it was checked for errors. It is possible that there are still dictated errors within this office note. If so, please bring any errors to my attention for an addendum. All efforts were made to ensure that this office note is accurate.          MARGARITA Tatum CNP  12/10/21 0012

## 2021-12-10 NOTE — ED NOTES
Did a walk test on pt, he managed to maintain sats of at least 92%,  Work of breathing still  WDL.       Melly Encarnacion, NATALIE  12/10/21 0005

## 2021-12-13 ENCOUNTER — CARE COORDINATION (OUTPATIENT)
Dept: CARE COORDINATION | Age: 61
End: 2021-12-13

## 2021-12-13 NOTE — CARE COORDINATION
.Patient contacted regarding COVID-19 diagnosis and pulse oximeter ordered at discharge. Discussed COVID-19 related testing which was available at this time. Test results were positive. Patient informed of results, if available? Yes    Ambulatory Care Manager contacted the patient by telephone to perform follow-up assessment. Verified name and  with patient as identifiers. Patient has following risk factors of: COPD. Symptoms reviewed with patient who verbalized the following symptoms: fever, fatigue, pain or aching joints, cough, shortness of breath and chills or shaking. Due to no new or worsening symptoms encounter was not routed to provider for escalation. Educated patient about risk for severe COVID-19 due to risk factors according to CDC guidelines. ACM reviewed discharge instructions, medical action plan and red flag symptoms with the patient who verbalized understanding. Discussed COVID vaccination status: Yes. Education provided on COVID-19 vaccination as appropriate. Discussed exposure protocols and quarantine with CDC Guidelines. Patient was given an opportunity to verbalize any questions and concerns and agrees to contact ACM or health care provider for questions related to their healthcare. Was patient discharged with a pulse oximeter? Yes Discussed and confirmed pulse oximeter discharge instructions and when to notify provider or seek emergency care. ACM provided contact information. Plan for follow-up call in 3-5 days based on severity of symptoms and risk factors.

## 2022-01-05 ENCOUNTER — OFFICE VISIT (OUTPATIENT)
Dept: INTERNAL MEDICINE CLINIC | Age: 62
End: 2022-01-05
Payer: COMMERCIAL

## 2022-01-05 VITALS
OXYGEN SATURATION: 96 % | TEMPERATURE: 96.7 F | WEIGHT: 147.2 LBS | SYSTOLIC BLOOD PRESSURE: 128 MMHG | BODY MASS INDEX: 27.09 KG/M2 | HEART RATE: 61 BPM | HEIGHT: 62 IN | DIASTOLIC BLOOD PRESSURE: 80 MMHG

## 2022-01-05 DIAGNOSIS — M25.562 ACUTE PAIN OF LEFT KNEE: ICD-10-CM

## 2022-01-05 DIAGNOSIS — D69.6 THROMBOCYTOPENIA (HCC): ICD-10-CM

## 2022-01-05 DIAGNOSIS — E55.9 VITAMIN D DEFICIENCY: ICD-10-CM

## 2022-01-05 DIAGNOSIS — N52.8 OTHER MALE ERECTILE DYSFUNCTION: ICD-10-CM

## 2022-01-05 DIAGNOSIS — E87.1 HYPONATREMIA: ICD-10-CM

## 2022-01-05 DIAGNOSIS — F17.210 SMOKES CIGARETTES: ICD-10-CM

## 2022-01-05 DIAGNOSIS — Z86.16 HISTORY OF COVID-19: ICD-10-CM

## 2022-01-05 DIAGNOSIS — J43.8 OTHER EMPHYSEMA (HCC): ICD-10-CM

## 2022-01-05 DIAGNOSIS — I10 PRIMARY HYPERTENSION: Primary | ICD-10-CM

## 2022-01-05 PROCEDURE — G8484 FLU IMMUNIZE NO ADMIN: HCPCS | Performed by: INTERNAL MEDICINE

## 2022-01-05 PROCEDURE — 4004F PT TOBACCO SCREEN RCVD TLK: CPT | Performed by: INTERNAL MEDICINE

## 2022-01-05 PROCEDURE — G8419 CALC BMI OUT NRM PARAM NOF/U: HCPCS | Performed by: INTERNAL MEDICINE

## 2022-01-05 PROCEDURE — 3017F COLORECTAL CA SCREEN DOC REV: CPT | Performed by: INTERNAL MEDICINE

## 2022-01-05 PROCEDURE — G8427 DOCREV CUR MEDS BY ELIG CLIN: HCPCS | Performed by: INTERNAL MEDICINE

## 2022-01-05 PROCEDURE — 99406 BEHAV CHNG SMOKING 3-10 MIN: CPT | Performed by: INTERNAL MEDICINE

## 2022-01-05 PROCEDURE — 99215 OFFICE O/P EST HI 40 MIN: CPT | Performed by: INTERNAL MEDICINE

## 2022-01-05 PROCEDURE — 3023F SPIROM DOC REV: CPT | Performed by: INTERNAL MEDICINE

## 2022-01-05 RX ORDER — ACETAMINOPHEN 325 MG/1
650 TABLET ORAL PRN
Qty: 120 TABLET | Status: CANCELLED | OUTPATIENT
Start: 2022-01-05

## 2022-01-05 RX ORDER — HYDROCHLOROTHIAZIDE 25 MG/1
25 TABLET ORAL EVERY MORNING
Qty: 90 TABLET | Refills: 0 | Status: CANCELLED | OUTPATIENT
Start: 2022-01-05

## 2022-01-05 RX ORDER — AMLODIPINE BESYLATE 10 MG/1
TABLET ORAL
Qty: 90 TABLET | Refills: 0 | Status: SHIPPED | OUTPATIENT
Start: 2022-01-05 | End: 2022-03-07 | Stop reason: SDUPTHER

## 2022-01-05 RX ORDER — CHOLECALCIFEROL (VITAMIN D3) 50 MCG
2000 TABLET ORAL DAILY
Qty: 90 TABLET | Refills: 0 | Status: CANCELLED | OUTPATIENT
Start: 2022-01-05

## 2022-01-05 RX ORDER — ASPIRIN 81 MG/1
81 TABLET, CHEWABLE ORAL DAILY
Qty: 30 TABLET | Refills: 2 | Status: CANCELLED | OUTPATIENT
Start: 2022-01-05

## 2022-01-05 RX ORDER — LABETALOL 300 MG/1
300 TABLET, FILM COATED ORAL DAILY
Qty: 90 TABLET | Refills: 0 | Status: SHIPPED | OUTPATIENT
Start: 2022-01-05 | End: 2022-03-07 | Stop reason: SDUPTHER

## 2022-01-05 RX ORDER — ALBUTEROL SULFATE 90 UG/1
2 AEROSOL, METERED RESPIRATORY (INHALATION) EVERY 6 HOURS PRN
Qty: 1 EACH | Refills: 1 | Status: SHIPPED | OUTPATIENT
Start: 2022-01-05

## 2022-01-05 RX ORDER — TIOTROPIUM BROMIDE 18 UG/1
18 CAPSULE ORAL; RESPIRATORY (INHALATION) DAILY
Qty: 90 CAPSULE | Refills: 0 | Status: SHIPPED | OUTPATIENT
Start: 2022-01-05 | End: 2022-03-07 | Stop reason: SDUPTHER

## 2022-01-05 RX ORDER — AMLODIPINE BESYLATE 10 MG/1
TABLET ORAL
Qty: 90 TABLET | Refills: 0 | Status: CANCELLED | OUTPATIENT
Start: 2022-01-05

## 2022-01-05 RX ORDER — LABETALOL 300 MG/1
300 TABLET, FILM COATED ORAL DAILY
Qty: 90 TABLET | Refills: 0 | Status: CANCELLED | OUTPATIENT
Start: 2022-01-05

## 2022-01-05 RX ORDER — CHOLECALCIFEROL (VITAMIN D3) 50 MCG
2000 TABLET ORAL DAILY
Qty: 90 TABLET | Refills: 0 | Status: SHIPPED | OUTPATIENT
Start: 2022-01-05 | End: 2022-03-07 | Stop reason: SDUPTHER

## 2022-01-05 RX ORDER — TADALAFIL 5 MG/1
5 TABLET ORAL DAILY PRN
Qty: 30 TABLET | Refills: 2 | Status: CANCELLED | OUTPATIENT
Start: 2022-01-05

## 2022-01-05 RX ORDER — HYDROCHLOROTHIAZIDE 25 MG/1
25 TABLET ORAL EVERY MORNING
Qty: 90 TABLET | Refills: 0 | Status: SHIPPED | OUTPATIENT
Start: 2022-01-05 | End: 2022-03-07 | Stop reason: SDUPTHER

## 2022-01-05 RX ORDER — TADALAFIL 5 MG/1
5 TABLET ORAL DAILY PRN
Qty: 30 TABLET | Refills: 2 | Status: SHIPPED | OUTPATIENT
Start: 2022-01-05 | End: 2022-01-18 | Stop reason: SDUPTHER

## 2022-01-05 ASSESSMENT — PATIENT HEALTH QUESTIONNAIRE - PHQ9
SUM OF ALL RESPONSES TO PHQ QUESTIONS 1-9: 0
2. FEELING DOWN, DEPRESSED OR HOPELESS: 0
SUM OF ALL RESPONSES TO PHQ QUESTIONS 1-9: 0
1. LITTLE INTEREST OR PLEASURE IN DOING THINGS: 0
SUM OF ALL RESPONSES TO PHQ QUESTIONS 1-9: 0
SUM OF ALL RESPONSES TO PHQ QUESTIONS 1-9: 0
SUM OF ALL RESPONSES TO PHQ9 QUESTIONS 1 & 2: 0

## 2022-01-05 NOTE — PATIENT INSTRUCTIONS
TAKE MED AS ADVISED    DIET/ EXERCISE. FOLLOW UP WITHIN 2 MONTHS / AS NEEDED    FOLLOW UP FOR LABS, X 454 [facility] Laboratory Locations - No appointment necessary. Sites open Monday to Friday. @ indicates the location is open Saturdays. Call your preferred location for test preparation, business hours and other information you need. [employer] accepts [employer]. [facility]    @ [place] Lab Svcs. [address] [place], [address] [place] [address] Ave   Ph: [phone] [facility] Lab Svcs. [address]   Ph: [phone]  @ [place] [facility] Lab Svcs. 3155 [facility]   Ph: [phone]    [facility] Lab Svcs. [address] [place]vioevelyn Richardjamar Hernandez [address]   Ph: [phone] Lab Svcs. [address]  Ph: [phone] [facility] Lab Svcs. 835 [facility]. Ian Jeronimo [place] [address]   Ph: [phone]      [facility] Lab Svcs. 1801 [facility], 400 [facility]   Ph: [phone] S [facility] 121 [facility] Lab Svcs. 747 [address] [place] [address]   Ph: [phone] [facility] Lab Svcs. 287 [facility], 1501 [facility]   Ph: [phone] [address] [place] [address]. Lab Svcs. 211 [facility], 1171 [facility]   Ph: [address] Lab Svcs. [place], [place] [address]   Ph: [phone] [facility]. Lab Svcs.   54 [facility]   Ph: [phone]

## 2022-01-05 NOTE — PROGRESS NOTES
SUBJECTIVE:  Vesta Dumont is a 63 y.o. male HERE FOR   Chief Complaint   Patient presents with    Hypertension    Other     PAIN MED IS NOT WORKING           PT HERE FOR EVAL       HTN - TAKING MEDS .  BP NOTED. ? DIET / EXERCISE COMPLIANCE. NO HEADACHE, NO DIZZINESS  EMPHYSEMA - + SOB, ? WHEEZING. ? INHALER USE   LT KNEE PAIN - SHARP PAIN, INTERMITTENT. NO RAD, PAIN SCALE 8/10. NO SWELLING, DENIES TRAUMA. STATES MED NOT HELPING  VIT D DEF - ? MED COMPLIANCE. PREVIOUS LAB D/W DPT  ED - ON MED. PT REQUESTING REFILL   HYPONA+ - LAB D/W PT  THROMBOCYTOPENIA - LAB D/W PT. DENIES BRUISING   H/O COVID - 12/21. STATES COUGH RESOLVED. NO F/C + SOB PERSISTENT  + SMOKER - CESSATION READDRESSED      DENIES CP, + SOB, No PALPITATIONS  No ABD PAIN, No N/V, No DIARRHEA, No CONSTIPATION, No MELENA, No HEMATOCHEZIA. No DYSURIA, No FREQ, No URGENCY, No HEMATURIA         PMH: REVIEWED AND UPDATED TODAY    PSH: REVIEWED AND UPDATED TODAY    SOCIAL HX: REVIEWED AND UPDATED TODAY    FAMILY HX: REVIEWED AND UPDATED TODAY    ALLERGY:  Pcn [penicillins]    MEDS: REVIEWED  Prior to Visit Medications    Medication Sig Taking? Authorizing Provider   albuterol sulfate HFA (VENTOLIN HFA) 108 (90 Base) MCG/ACT inhaler Inhale 2 puffs into the lungs 4 times daily as needed for Wheezing Yes Linda Real, APRN - CNP   amLODIPine (NORVASC) 10 MG tablet TAKE 1 TABLET BY MOUTH EVERY DAY Yes Bill Person MD   hydroCHLOROthiazide (HYDRODIURIL) 25 MG tablet Take 1 tablet by mouth every morning Yes Bill Person MD   labetalol (NORMODYNE) 300 MG tablet Take 1 tablet by mouth daily Yes Bill Person MD   tadalafil (CIALIS) 5 MG tablet Take 1 tablet by mouth daily as needed for Erectile Dysfunction Take one tablet daily as needed for erectile dysfunction.  Yes Bill Person MD   vitamin D (CHOLECALCIFEROL) 50 MCG (2000 UT) TABS tablet Take 1 tablet by mouth daily Yes Bill Person MD   aspirin 81 MG chewable tablet Take 1 tablet by mouth daily Yes MARGARITA Go   varenicline (CHANTIX CONTINUING MONTH ROSARIO) 1 MG tablet Take 1 tablet by mouth 2 times daily Yes MARGARITA Go   acetaminophen (TYLENOL) 325 MG tablet Take 650 mg by mouth as needed Yes Historical Provider, MD   meloxicam (MOBIC) 7.5 MG tablet Take 1 tablet by mouth daily as needed for Pain  Theresa Leija MD       ROS: COMPREHENSIVE ROS AS IN HX, REST -VE  History obtained from chart review and the patient       OBJECTIVE:   NURSING NOTE AND VITALS REVIEWED  /82 (Site: Right Upper Arm, Position: Sitting, Cuff Size: Medium Adult)   Pulse 61   Temp 96.7 °F (35.9 °C)   Ht 5' 2\" (1.575 m)   Wt 147 lb 3.2 oz (66.8 kg)   SpO2 96%   BMI 26.92 kg/m²     NO ACUTE DISTRESS    REPEAT BP: 128/80 (RT), NO ORTHOSTASIS     Body mass index is 26.92 kg/m². HEENT: NO PALLOR, ANICTERIC, PERRLA, EOMI, NO CONJUNCTIVAL ERYTHEMA,                 NO SINUS TENDERNESS  NECK:  SUPPLE, TRACHEA MIDLINE, NT, NO JVD, NO CB, NO LA, NO TM, NO STIFFNESS  CHEST: RESPY EFFORT NL, GOOD AE, OCC WHEEZE, NO R/C  HEART: S1S2+ REG, NO M/G/R  ABD: SOFT, NT, NO HSM, BS+  EXT: NO EDEMA, NT, PULSES +. SARA'S -VE  NEURO: ALERT AND ORIENTED X 3, NO MENINGEAL SIGNS, NO TREMORS, NL GAIT, NO FOCAL DEFICITS  PSYCH: FAIRLY GOOD AFFECT  BACK: NT, NO ROM, NO CVA TENDERNESS  KNEE: NO SWELLING, + TENDERNESS LT, + PAIN WITH MVT - LT, NO ROM       PREVIOUS LABS / CXR REVIEWED AND D/W PT     Impression   Chronic changes of COPD with no acute finding.             ASSESSMENT / PLAN:     Diagnosis Orders   1. Primary hypertension  COUNSELLED. CONTINUE MEDS. LOW NA+ / DASH DIET/ EXERCISE. MONITOR. GOAL </= 130/80  F/U LABS  MAKE CHANGES AS NEEDED. 2. Other emphysema ([address]  COUNSELLED. START ON SPIRIVA  HANDIHALER 18 MCG DAILY '  ALBUTEROL PRN. MONITOR. ADVISED SMOKING CESSATION  MAKE CHANGES AS NEEDED. 3. Acute pain of left knee  COUNSELLED. ? OA. EXERCISES. ANALGESICS PRN. MONITOR.   CHANGE TO diclofenac (VOLTAREN) 50 MG EC BID / PRN WITH FOOD  HOME EXERCISES  X RAY TO EVAL. F/U LAB  MAKE CHANGES AS NEEDED. 4. Vitamin D deficiency  COUNSELLED. ADVISED MED COMPLIANCE - ADVISED VIT D 2000 U DAILY. MONITOR AND MAKE CHANGES AS NEEDED. 5. Other male erectile dysfunction  COUNSELLED. CIALIS 5 MG REFILLED - S/E D/W PT  MAKE CHANGES AS NEEDED. 6. Hyponatremia  COUNSELLED. LAB TO [place]. READDRESS FURTHER EVAL IF PERSISTENT  MAKE CHANGES AS NEEDED. 7. Thrombocytopenia ([address]  COUNSELLED. ASYMPTOMATIC. LAB TO [place]  MAKE CHANGES AS NEEDED. 8. History of COVID-19  COUNSELLED. COUNSELLED. ADVISED SAFETY PRECAUTIONS. ADVISED ON COVID VACCINE       9. Smokes cigarettes  Smoking cessation was encouraged. Cessation techniques reviewed today. COUNSELLED.  CESSATION ADVISED   GA TOBACCO USE CESSATION INTERMEDIATE 3-10 MINUTES (5 MINS)  COMPLICATIONS OF TOBACCO USE INCLUDING COPD, CANCER, CAD D/W PT  PT VERBALIZED UNDERSTANDING              PT DECLINED INFLUENZA VACCINE          MORE THAN HALF THE TIME (>20MINS)  SPENT ON HISTORY, PHYSICAL, ASSESSMENT AND PLAN, DISCUSSION AND COUNSELLING      MEDICATION SIDE EFFECTS D/W PATIENT    RETURN TO CLINIC WITHIN 2 MONTHS / PRN    FOLLOW UP FOR LABS, X RAY

## 2022-01-10 ENCOUNTER — HOSPITAL ENCOUNTER (OUTPATIENT)
Age: 62
Discharge: HOME | End: 2022-01-10
Payer: COMMERCIAL

## 2022-01-10 ENCOUNTER — HOSPITAL ENCOUNTER (OUTPATIENT)
Dept: GENERAL RADIOLOGY | Age: 62
Discharge: HOME | End: 2022-01-10
Payer: COMMERCIAL

## 2022-01-10 DIAGNOSIS — I10 PRIMARY HYPERTENSION: ICD-10-CM

## 2022-01-10 DIAGNOSIS — M25.562 ACUTE PAIN OF LEFT KNEE: ICD-10-CM

## 2022-01-10 DIAGNOSIS — D69.6 THROMBOCYTOPENIA (HCC): ICD-10-CM

## 2022-01-10 DIAGNOSIS — E87.1 HYPONATREMIA: ICD-10-CM

## 2022-01-10 DIAGNOSIS — E55.9 VITAMIN D DEFICIENCY: ICD-10-CM

## 2022-01-10 LAB
A/G RATIO: 1.5 (ref 1.1–2.2)
ALBUMIN SERPL-MCNC: 4.1 G/DL (ref 3.4–5)
ALP BLD-CCNC: 82 U/L (ref 40–129)
ALT SERPL-CCNC: 12 U/L (ref 10–40)
ANION GAP SERPL CALCULATED.3IONS-SCNC: 11 MMOL/L (ref 3–16)
AST SERPL-CCNC: 16 U/L (ref 15–37)
BASOPHILS ABSOLUTE: 0 K/UL (ref 0–0.2)
BASOPHILS RELATIVE PERCENT: 0.6 %
BILIRUB SERPL-MCNC: 0.3 MG/DL (ref 0–1)
BUN BLDV-MCNC: 19 MG/DL (ref 7–20)
CALCIUM SERPL-MCNC: 8.8 MG/DL (ref 8.3–10.6)
CHLORIDE BLD-SCNC: 106 MMOL/L (ref 99–110)
CO2: 25 MMOL/L (ref 21–32)
CREAT SERPL-MCNC: 1 MG/DL (ref 0.9–1.3)
EOSINOPHILS ABSOLUTE: 0.2 K/UL (ref 0–0.6)
EOSINOPHILS RELATIVE PERCENT: 3.3 %
GFR AFRICAN AMERICAN: >60
GFR NON-AFRICAN AMERICAN: >60
GLUCOSE BLD-MCNC: 113 MG/DL (ref 70–99)
HCT VFR BLD CALC: 39.3 % (ref 40.5–52.5)
HEMOGLOBIN: 12.5 G/DL (ref 13.5–17.5)
LYMPHOCYTES ABSOLUTE: 2.4 K/UL (ref 1–5.1)
LYMPHOCYTES RELATIVE PERCENT: 44.8 %
MCH RBC QN AUTO: 29.7 PG (ref 26–34)
MCHC RBC AUTO-ENTMCNC: 31.8 G/DL (ref 31–36)
MCV RBC AUTO: 93.3 FL (ref 80–100)
MONOCYTES ABSOLUTE: 0.4 K/UL (ref 0–1.3)
MONOCYTES RELATIVE PERCENT: 8.6 %
NEUTROPHILS ABSOLUTE: 2.2 K/UL (ref 1.7–7.7)
NEUTROPHILS RELATIVE PERCENT: 42.7 %
PDW BLD-RTO: 15.9 % (ref 12.4–15.4)
PLATELET # BLD: 150 K/UL (ref 135–450)
PMV BLD AUTO: 9.9 FL (ref 5–10.5)
POTASSIUM SERPL-SCNC: 4.2 MMOL/L (ref 3.5–5.1)
RBC # BLD: 4.21 M/UL (ref 4.2–5.9)
SODIUM BLD-SCNC: 142 MMOL/L (ref 136–145)
TOTAL PROTEIN: 6.8 G/DL (ref 6.4–8.2)
URIC ACID, SERUM: 5.1 MG/DL (ref 3.5–7.2)
VITAMIN D 25-HYDROXY: 29.5 NG/ML
WBC # BLD: 5.2 K/UL (ref 4–11)

## 2022-01-10 PROCEDURE — 73560 X-RAY EXAM OF KNEE 1 OR 2: CPT

## 2022-01-12 ENCOUNTER — TELEPHONE (OUTPATIENT)
Dept: ORTHOPEDIC SURGERY | Age: 62
End: 2022-01-12

## 2022-01-12 NOTE — TELEPHONE ENCOUNTER
PA for Tadalafil 5MG tablets submitted to [facility] via [employer].     STATUS: PENDING

## 2022-01-14 ENCOUNTER — TELEPHONE (OUTPATIENT)
Dept: INTERNAL MEDICINE CLINIC | Age: 62
End: 2022-01-14

## 2022-01-14 NOTE — TELEPHONE ENCOUNTER
Patient is requesting medication cialis called to [address] because it would be cheaper please advise.

## 2022-01-26 NOTE — PATIENT INSTRUCTIONS
TAKE MED AS ADVISED    DIET/ EXERCISE.     FOLLOW UP WITHIN 3 MONTHS / AS NEEDED    FOLLOW UP FOR LABS
Quality 111:Pneumonia Vaccination Status For Older Adults: Pneumococcal Vaccination Previously Received
Quality 431: Preventive Care And Screening: Unhealthy Alcohol Use - Screening: Patient not identified as an unhealthy alcohol user when screened for unhealthy alcohol use using a systematic screening method
Additional Notes: Received covid shots
Quality 110: Preventive Care And Screening: Influenza Immunization: Influenza Immunization Administered during Influenza season
Quality 226: Preventive Care And Screening: Tobacco Use: Screening And Cessation Intervention: Patient screened for tobacco use and is an ex/non-smoker
Detail Level: Detailed

## 2022-01-30 DIAGNOSIS — M25.562 ACUTE PAIN OF LEFT KNEE: ICD-10-CM

## 2022-03-07 ENCOUNTER — OFFICE VISIT (OUTPATIENT)
Dept: INTERNAL MEDICINE CLINIC | Age: 62
End: 2022-03-07
Payer: COMMERCIAL

## 2022-03-07 VITALS
DIASTOLIC BLOOD PRESSURE: 78 MMHG | OXYGEN SATURATION: 96 % | HEIGHT: 65 IN | WEIGHT: 160 LBS | HEART RATE: 63 BPM | BODY MASS INDEX: 26.66 KG/M2 | SYSTOLIC BLOOD PRESSURE: 120 MMHG

## 2022-03-07 DIAGNOSIS — N52.8 OTHER MALE ERECTILE DYSFUNCTION: ICD-10-CM

## 2022-03-07 DIAGNOSIS — I10 PRIMARY HYPERTENSION: ICD-10-CM

## 2022-03-07 DIAGNOSIS — M25.462 PAIN AND SWELLING OF KNEE, LEFT: Primary | ICD-10-CM

## 2022-03-07 DIAGNOSIS — Z87.891 PERSONAL HISTORY OF TOBACCO USE: ICD-10-CM

## 2022-03-07 DIAGNOSIS — E55.9 VITAMIN D DEFICIENCY: ICD-10-CM

## 2022-03-07 DIAGNOSIS — M25.562 PAIN AND SWELLING OF KNEE, LEFT: Primary | ICD-10-CM

## 2022-03-07 DIAGNOSIS — J43.8 OTHER EMPHYSEMA (HCC): ICD-10-CM

## 2022-03-07 PROCEDURE — 3017F COLORECTAL CA SCREEN DOC REV: CPT | Performed by: INTERNAL MEDICINE

## 2022-03-07 PROCEDURE — G8419 CALC BMI OUT NRM PARAM NOF/U: HCPCS | Performed by: INTERNAL MEDICINE

## 2022-03-07 PROCEDURE — G0296 VISIT TO DETERM LDCT ELIG: HCPCS | Performed by: INTERNAL MEDICINE

## 2022-03-07 PROCEDURE — 96372 THER/PROPH/DIAG INJ SC/IM: CPT | Performed by: INTERNAL MEDICINE

## 2022-03-07 PROCEDURE — G8427 DOCREV CUR MEDS BY ELIG CLIN: HCPCS | Performed by: INTERNAL MEDICINE

## 2022-03-07 PROCEDURE — 3023F SPIROM DOC REV: CPT | Performed by: INTERNAL MEDICINE

## 2022-03-07 PROCEDURE — 4004F PT TOBACCO SCREEN RCVD TLK: CPT | Performed by: INTERNAL MEDICINE

## 2022-03-07 PROCEDURE — 99214 OFFICE O/P EST MOD 30 MIN: CPT | Performed by: INTERNAL MEDICINE

## 2022-03-07 PROCEDURE — G8484 FLU IMMUNIZE NO ADMIN: HCPCS | Performed by: INTERNAL MEDICINE

## 2022-03-07 RX ORDER — DICLOFENAC SODIUM 75 MG/1
75 TABLET, DELAYED RELEASE ORAL 2 TIMES DAILY PRN
Qty: 60 TABLET | Refills: 0 | Status: SHIPPED | OUTPATIENT
Start: 2022-03-07 | End: 2022-04-05

## 2022-03-07 RX ORDER — TRIAMCINOLONE ACETONIDE 40 MG/ML
40 INJECTION, SUSPENSION INTRA-ARTICULAR; INTRAMUSCULAR ONCE
Status: COMPLETED | OUTPATIENT
Start: 2022-03-07 | End: 2022-03-07

## 2022-03-07 RX ORDER — TIOTROPIUM BROMIDE 18 UG/1
18 CAPSULE ORAL; RESPIRATORY (INHALATION) DAILY
Qty: 90 CAPSULE | Refills: 0 | Status: SHIPPED | OUTPATIENT
Start: 2022-03-07 | End: 2022-06-06 | Stop reason: SDUPTHER

## 2022-03-07 RX ORDER — LABETALOL 300 MG/1
300 TABLET, FILM COATED ORAL DAILY
Qty: 90 TABLET | Refills: 0 | Status: SHIPPED | OUTPATIENT
Start: 2022-03-07 | End: 2022-06-06 | Stop reason: SDUPTHER

## 2022-03-07 RX ORDER — CHOLECALCIFEROL (VITAMIN D3) 50 MCG
2000 TABLET ORAL DAILY
Qty: 90 TABLET | Refills: 0 | Status: SHIPPED | OUTPATIENT
Start: 2022-03-07 | End: 2022-04-04

## 2022-03-07 RX ORDER — AMLODIPINE BESYLATE 10 MG/1
TABLET ORAL
Qty: 90 TABLET | Refills: 0 | Status: SHIPPED | OUTPATIENT
Start: 2022-03-07 | End: 2022-06-06 | Stop reason: SDUPTHER

## 2022-03-07 RX ORDER — HYDROCHLOROTHIAZIDE 25 MG/1
25 TABLET ORAL EVERY MORNING
Qty: 90 TABLET | Refills: 0 | Status: SHIPPED | OUTPATIENT
Start: 2022-03-07 | End: 2022-06-06 | Stop reason: SDUPTHER

## 2022-03-07 RX ORDER — KETOROLAC TROMETHAMINE 30 MG/ML
60 INJECTION, SOLUTION INTRAMUSCULAR; INTRAVENOUS ONCE
Status: COMPLETED | OUTPATIENT
Start: 2022-03-07 | End: 2022-03-07

## 2022-03-07 RX ADMIN — KETOROLAC TROMETHAMINE 60 MG: 30 INJECTION, SOLUTION INTRAMUSCULAR; INTRAVENOUS at 11:47

## 2022-03-07 RX ADMIN — TRIAMCINOLONE ACETONIDE 40 MG: 40 INJECTION, SUSPENSION INTRA-ARTICULAR; INTRAMUSCULAR at 11:48

## 2022-03-07 ASSESSMENT — PATIENT HEALTH QUESTIONNAIRE - PHQ9
SUM OF ALL RESPONSES TO PHQ QUESTIONS 1-9: 0
SUM OF ALL RESPONSES TO PHQ9 QUESTIONS 1 & 2: 0
SUM OF ALL RESPONSES TO PHQ QUESTIONS 1-9: 0
2. FEELING DOWN, DEPRESSED OR HOPELESS: 0
1. LITTLE INTEREST OR PLEASURE IN DOING THINGS: 0
SUM OF ALL RESPONSES TO PHQ QUESTIONS 1-9: 0
SUM OF ALL RESPONSES TO PHQ QUESTIONS 1-9: 0

## 2022-03-07 NOTE — PATIENT INSTRUCTIONS
TAKE MED AS ADVISED    DIET/ EXERCISE. FOLLOW UP WITHIN 2-3 MONTHS / AS NEEDED    FOLLOW UP FOR CAT SCAN, 28 [facility], [address] Laboratory Locations - No appointment necessary. @ indicates the location is open Saturdays in addition to Monday through Friday. Call your preferred location for test preparation, business hours and other information you need. [employer] accepts [employer]. [facility]    @ [place] Lab Svcs. 3 [facility] [address] [place] [address]. [address]   Ph: [phone] [facility]. [address]   Ph: [phone]  @ [facility] Lab Svcs. 3155 [facility]   Ph: [phone]    [facility] Lab Svcs. [address]   Ph: [phone] @ [place] Lab Svcs. [address]  Ph: [phone] @ [facility] Lab Svcs. [address] [facility] [address]. Ian [facility]   Ph: [phone]    [place]   @ [facility] Lab Svcs. 3104 [place], [place] [address]   Ph: [phone] [facility]. Office Bldg. 3280 Matt [facility] 800 [facility]  Ph: [address]:  [address] Lab Svcs. 54 [facility]   Ph: [phone] [facility]. Lab Svcs. 211 [place], [place] [address]   Ph: [phone]          What is lung cancer screening? Lung cancer screening is a way in which doctors check the lungs for early signs of cancer in people who have no symptoms of lung cancer. A low-dose CT scan uses much less radiation than a normal CT scan and shows a more detailed image of the lungs than a standard X-ray. The goal of lung cancer screening is to find cancer early, before it has a chance to grow, spread, or cause problems.   One large study found that smokers who were screened with low-dose CT scans were less likely to die of lung cancer than those who were screened with standard X-ray. Below is a summary of the things you need to know regarding screening for lung cancer with low-dose computed tomography (LDCT). This is a screening program that involves routine annual screening with LDCT studies of the lung. The LDCTs are done using low-dose radiation that is not thought to increase your cancer risk. If you have other serious medical conditions (other cancers, congestive heart failure) that limit your life expectancy to less than 10 years, you should not undergo lung cancer screening with LDCT. The chance is 20%-60% that the LDCT result will show abnormalities. This would require additional testing which could include repeat imaging or even invasive procedures. Most (about 95%) of \"abnormal\" LDCT results are false in the sense that no lung cancer is ultimately found. Additionally, some (about 10%) of the cancers found would not affect your life expectancy, even if undetected and untreated. If you are still smoking, the single most important thing that you can do to reduce your risk of dying of lung cancer is to quit. For this screening to be covered by Medicare and most other insurers, strict criteria must be met. If you do not meet these criteria, but still wish to undergo LDCT testing, you will be required to sign a waiver indicating your willingness to pay for the scan.

## 2022-03-07 NOTE — PROGRESS NOTES
HANDIHALER) 18 MCG inhalation capsule Inhale 1 capsule into the lungs daily Yes Iwona Ledezma MD   albuterol sulfate HFA (VENTOLIN HFA) 108 (90 Base) MCG/ACT inhaler Inhale 2 puffs into the lungs every 6 hours as needed for Wheezing or Shortness of Breath Yes Iwona Ledezma MD   varenicline (CHANTIX CONTINUING MONTH ROSARIO) 1 MG tablet Take 1 tablet by mouth 2 times daily Yes MARGARITA Vela   acetaminophen (TYLENOL) 325 MG tablet Take 650 mg by mouth as needed Yes Historical Provider, MD   meloxicam (MOBIC) 7.5 MG tablet Take 1 tablet by mouth daily as needed for Pain  Iwona Ledezma MD       ROS: COMPREHENSIVE ROS AS IN HX, REST -VE  History obtained from chart review and the patient       OBJECTIVE:   NURSING NOTE AND VITALS REVIEWED  /78   Pulse 63   Ht 5' 5\" (1.651 m)   Wt 160 lb (72.6 kg)   SpO2 96%   BMI 26.63 kg/m²     NO ACUTE DISTRESS    REPEAT BP: 120/78 (LT), NO ORTHOSTASIS     Body mass index is 26.63 kg/m². HEENT: NO PALLOR, ANICTERIC, PERRLA, EOMI, NO CONJUNCTIVAL ERYTHEMA,                 NO SINUS TENDERNESS  NECK:  SUPPLE, TRACHEA MIDLINE, NT, NO JVD, NO CB, NO LA, NO TM, NO STIFFNESS  CHEST: RESPY EFFORT NL, GOOD AE, NO W/R/C  HEART: S1S2+ REG, NO M/G/R  ABD: SOFT, NT, NO HSM, BS+  EXT: NO EDEMA, NT, PULSES +. SARA'S -VE  NEURO: ALERT AND ORIENTED X 3, NO MENINGEAL SIGNS, NO TREMORS, AMBULATING WITH A LIMP OTHERWISE, NO FOCAL DEFICITS  PSYCH: FAIRLY GOOD AFFECT  BACK: NT, NO ROM, NO CVA TENDERNESS  KNEE: + SWELLING - LT. + TENDERNESS  MOSTLY ANTLAT, + PAIN WITH MVT - LT, + ROM SEC. TO PAIN       PREVIOUS LABS / X RAY  REVIEWED AND D/W PT  Impression       1.  No findings for acute bony abnormality within the left knee.       2.  Post surgical changes overlying the medial femoral condyle with moderate hypertrophic osteoarthrosis most pronounced in the medial joint compartment and patellofemoral joint as described. ASSESSMENT / PLAN:     Diagnosis Orders   1.  Pain and swelling of knee, left  COUNSELLED. TORADOL 60 MG + KENALOG 40 MG GIVEN IM FOR ACUTE PAIN - PT TOLERATED. EXERCISES. ANALGESICS PRN. MONITOR. INCREASE diclofenac (VOLTAREN) TO 75 MG EC BID / PRN WITH FOOD  REFER ORTHO  MAKE CHANGES AS NEEDED. 2. Primary hypertension  COUNSELLED. CONTINUE MEDS. ADVISED LOW NA+ / DASH DIET/ EXERCISE. MONITOR. GOAL </= 130/80  MAKE CHANGES AS NEEDED. 3. Other emphysema ([address]  COUNSELLED. CONTINUE SPIRIVA ALBUTEROL PRN. MONITOR. MAKE CHANGES AS NEEDED. 4. Vitamin D deficiency  COUNSELLED. MONITOR ON VIT D SUPPLEMENT. MAKE CHANGES AS NEEDED. 5. Other male erectile dysfunction  COUNSELLED. MED PRN. MONITOR  MAKE CHANGES AS NEEDED. 6. Personal history of tobacco use / Smokes cigarettes  Smoking cessation was encouraged. Cessation techniques reviewed today. COUNSELLED. CESSATION ADVISED  COMPLICATIONS OF TOBACCO USE INCLUDING COPD, CANCER, CAD D/W PT  PT VERBALIZED UNDERSTANDING  TX VISIT TO DISCUSS LUNG CA SCREEN W LDCT  CT Lung Screen (Initial or Annual)   MAKE CHANGES AS NEEDED. Low Dose CT (LDCT) Lung Screening criteria met:     Age 55-77(Medicare) or 50-80 ([facility])   Pack year smoking >30 (Medicare) or >20 ([facility])   Still smoking or less than 15 year since quit   No sign or symptoms of lung cancer   > 11 months since last LDCT     Risks and benefits of lung cancer screening with LDCT scans discussed:    Significance of positive screen - False-positive LDCT results often occur. 95% of all positive results do not lead to a diagnosis of cancer. Usually further imaging can resolve most false-positive results; however, some patients may require invasive procedures. Over diagnosis risk - 10% to 12% of screen-detected lung cancer cases are over diagnosedthat is, the cancer would not have been detected in the patient's lifetime without the screening.     Need for follow up screens annually to continue lung cancer screening effectiveness     Risks associated with radiation from annual LDCT- Radiation exposure is about the same as for a mammogram, which is about 1/3 of the annual background radiation exposure from everyday life. Starting screening at age 54 is not likely to increase cancer risk from radiation exposure. Patients with comorbidities resulting in life expectancy of < 10 years, or that would preclude treatment of an abnormality identified on CT, should not be screened due to lack of benefit.     To obtain maximal benefit from this screening, smoking cessation and long-term abstinence from smoking is critical        MEDICATION SIDE EFFECTS D/W PATIENT      RETURN TO CLINIC WITHIN 2-3 MONTHS / PRN    FOLLOW UP FOR CAT SCAN, ORTHO

## 2022-03-17 ENCOUNTER — HOSPITAL ENCOUNTER (OUTPATIENT)
Dept: CT IMAGING | Age: 62
Discharge: HOME | End: 2022-03-17
Payer: COMMERCIAL

## 2022-03-17 DIAGNOSIS — Z87.891 PERSONAL HISTORY OF TOBACCO USE: ICD-10-CM

## 2022-03-17 PROCEDURE — 71271 CT THORAX LUNG CANCER SCR C-: CPT

## 2022-03-21 ENCOUNTER — OFFICE VISIT (OUTPATIENT)
Dept: ORTHOPEDIC SURGERY | Age: 62
End: 2022-03-21
Payer: COMMERCIAL

## 2022-03-21 VITALS — RESPIRATION RATE: 16 BRPM | BODY MASS INDEX: 26.63 KG/M2 | HEIGHT: 65 IN

## 2022-03-21 DIAGNOSIS — T84.498A EXPOSED ORTHOPAEDIC HARDWARE (HCC): ICD-10-CM

## 2022-03-21 DIAGNOSIS — M25.562 ACUTE PAIN OF LEFT KNEE: Primary | ICD-10-CM

## 2022-03-21 DIAGNOSIS — M25.462 EFFUSION OF LEFT KNEE: ICD-10-CM

## 2022-03-21 DIAGNOSIS — M17.5 OTHER SECONDARY OSTEOARTHRITIS OF LEFT KNEE: ICD-10-CM

## 2022-03-21 PROCEDURE — G8427 DOCREV CUR MEDS BY ELIG CLIN: HCPCS | Performed by: ORTHOPAEDIC SURGERY

## 2022-03-21 PROCEDURE — 20610 DRAIN/INJ JOINT/BURSA W/O US: CPT | Performed by: ORTHOPAEDIC SURGERY

## 2022-03-21 PROCEDURE — 3017F COLORECTAL CA SCREEN DOC REV: CPT | Performed by: ORTHOPAEDIC SURGERY

## 2022-03-21 PROCEDURE — 99203 OFFICE O/P NEW LOW 30 MIN: CPT | Performed by: ORTHOPAEDIC SURGERY

## 2022-03-21 PROCEDURE — 4004F PT TOBACCO SCREEN RCVD TLK: CPT | Performed by: ORTHOPAEDIC SURGERY

## 2022-03-21 PROCEDURE — G8419 CALC BMI OUT NRM PARAM NOF/U: HCPCS | Performed by: ORTHOPAEDIC SURGERY

## 2022-03-21 PROCEDURE — G8484 FLU IMMUNIZE NO ADMIN: HCPCS | Performed by: ORTHOPAEDIC SURGERY

## 2022-03-21 NOTE — PROGRESS NOTES
CHIEF COMPLAINT: Left knee pain. History:   Eliane Malone is a 63 y.o. male referred by Carlita Pabon MD for Sports Medicine consultation for evaluation and treatment of left knee pain / injury. The patient complains of left knee pain. This is evaluated as a personal injury. The pain worsened 1 month ago. Rates pain 10/10. There was not a history of injury. He states he had a history of a patella fracture repair when he was a child. However, this appears to be more a treatment of a osteochondral fracture based on x-rays. The pain is located globally. The knee has not given out or felt unstable. The patient can bend and straighten the knee fully. There was significant increase in swelling in the knee. There was catching / locking of the knee. He previously saw Dr. Kimberly Velasco in 2014. Dr. Kirsten Wilhelm did have concern for possibly one of the pins within the medial femoral condyle being prominent in order to CT. Apparently this was not performed and he did not follow-up with him afterwards. The patient has not had PT. The patient has not had an injection. The patient has taken NSAIDs. The patient has not tried ice. Outside reports reviewed: Dr. Erasmo Rivera office note.     Past Medical History:   Diagnosis Date    Aneurysm ([facility] [address]     Arthritis     Cerebral artery occlusion with cerebral infarction [facility])     [address] 2016    Hypertension     Smokes cigarettes     Subdural hematoma (HCC)        Past Surgical History:   Procedure Laterality Date    EXCISION LESION HAND / FINGER Left 3/28/2019    LEFT HAND MASS EXCISION performed by Kalyan Luis MD at [address] [place] [address] HAND SURGERY Left 03/28/2019    Left thenar eminence Mass    KNEE SURGERY      knee cap repair at age 18    [de-identified] SURGERY      SH[facility]ER [facility]ROSCOPY Left     at [facility]       Family History   Problem Relation Age of Onset    High Blood Pressure Mother     Diabetes Mother     Other Father         SEIZURES    High Blood Pressure Father     Cancer Maternal Aunt         ? TYPE       Social History     Socioeconomic History    Marital status: Single     Spouse name: None    Number of children: 5    Years of education: None    Highest education level: None   Occupational History    Occupation: CONSTRUCTION    Occupation: Maintenance     Employer: Did not disclose   Tobacco Use    Smoking status: Current Every Day Smoker     Packs/day: 1.00     Years: 40.00     Pack years: 40.00     Types: Cigarettes     Start date: 1982    Smokeless tobacco: Never Used    Tobacco comment: Taking Chantix   Vaping Use    Vaping Use: Never used   Substance and Sexual Activity    Alcohol use: No    Drug use: No    Sexual activity: Yes     Partners: Female     Comment: SINGLE   Other Topics Concern    None   Social History Narrative    Live with girlfriend. Social Determinants of Health     Financial Resource Strain:     Difficulty of Paying Living Expenses: Not on file   Food Insecurity:     Worried About Running Out of Food in the Last Year: Not on file    Peter of Food in the Last Year: Not on file   Transportation Needs:     Lack of Transportation (Medical): Not on file    Lack of Transportation (Non-Medical):  Not on file   Physical Activity:     Days of Exercise per Week: Not on file    Minutes of Exercise per Session: Not on file   Stress:     Feeling of Stress : Not on file   Social Connections:     Frequency of Communication with Friends and Family: Not on file    Frequency of Social Gatherings with Friends and Family: Not on file    Attends Baptism Services: Not on file    Active Member of Clubs or Organizations: Not on file    Attends Club or Organization Meetings: Not on file    Marital Status: Not on file   Intimate Partner Violence:     Fear of Current or Ex-Partner: Not on file    Emotionally Abused: Not on file    Physically Abused: Not on file    Sexually Abused: Not on file   Housing Stability:     Unable to Pay for Housing in the Last Year: Not on file    Number of Places Lived in the Last Year: Not on file    Unstable Housing in the Last Year: Not on file       Current Outpatient Medications   Medication Sig Dispense Refill    vitamin D (CHOLECALCIFEROL) 50 MCG (2000 UT) TABS tablet Take 1 tablet by mouth daily 90 tablet 0    amLODIPine (NORVASC) 10 MG tablet TAKE 1 TABLET BY MOUTH EVERY DAY 90 tablet 0    hydroCHLOROthiazide (HYDRODIURIL) 25 MG tablet Take 1 tablet by mouth every morning 90 tablet 0    labetalol (NORMODYNE) 300 MG tablet Take 1 tablet by mouth daily 90 tablet 0    tiotropium (SPIRIVA HANDIHALER) 18 MCG inhalation capsule Inhale 1 capsule into the lungs daily 90 capsule 0    diclofenac (VOLTAREN) 75 MG EC tablet Take 1 tablet by mouth 2 times daily as needed for Pain 60 tablet 0    [employer] ASPIRIN ADULT LOW DOSE 81 MG chewable tablet TAKE 1 TABLET BY MOUTH EVERY DAY 30 tablet 3    tadalafil (CIALIS) 5 MG tablet Take 1 tablet by mouth daily as needed for Erectile Dysfunction Take one tablet daily as needed for erectile dysfunction. 30 tablet 2    albuterol sulfate HFA (VENTOLIN HFA) 108 (90 Base) MCG/ACT inhaler Inhale 2 puffs into the lungs every 6 hours as needed for Wheezing or Shortness of Breath 1 each 1    varenicline (CHANTIX CONTINUING MONTH ROSARIO) 1 MG tablet Take 1 tablet by mouth 2 times daily 60 tablet 1    acetaminophen (TYLENOL) 325 MG tablet Take 650 mg by mouth as needed       No current facility-administered medications for this visit. Allergies   Allergen Reactions    Pcn [Penicillins] Anaphylaxis and Swelling       Review of Systems:  I have reviewed the clinically relevant past medical history, medications, allergies, family history, social history, and 13 point Review of Systems from the patient's recent history form & documented any details relevant to today's presenting complaints in the history above.  The patient's self-reported past medical history, medications, allergies, family history, social history, and Review of Systems form from 3/21/22 have been scanned into the chart under the \"Media\" tab. Physical Examination:     Vital signs:   Resp 16   Ht 5' 5\" (1.651 m)   BMI 26.63 kg/m²     General:  alert, appears stated age, cooperative and no distress   Gait:  Antalgic. The patient can bear weight on the injured extremity. Left Knee  Alignment:  neutral   ROM:  0 degrees extension to 100 degrees flexion   Right knee: 0 degrees extension, 130 flexion   Crepitus:  no   Joint Tenderness:  globally, but he does have tenderness along his medial femoral condyle   Effusion:   >60 cc   Patellar excursion:  2 of 4 quadrants    Patellar tilt test:  positive   Patellar facet tenderness:  positive medial   positive lateral   Anterior drawer test:  negative   Right knee: negative   Posterior drawer:   negative   Right knee: negative   Varus laxity at 30 degrees:  negative   Right knee: negative   Valgus laxity at 30 degrees:   negative   Right knee: negative     There is not any cellulitis, lymphedema or cutaneous lesions noted in the lower extremities. Motor exam of the lower extremities show quadriceps, hamstrings, foot dorsiflexion and plantarflexion grossly intact. Sensation to both feet is grossly intact to light touch. The bilateral lower extremities are warm and well-perfused with brisk capillary refill. Imaging:  Left Knee X-Ray: Bilateral sunrise and standing PA xrays of the knee were obtained and reviewed. Lateral view from outside 1/10/22 independently reviewed. No acute fracture or dislocation. He does have moderate to severe medial compartment narrowing, with marginal osteophytes. He does have chondrocalcinosis in the lateral compartment. On the AP view, one of the pins does appear to be protruding out through the medial femoral condyle.         Assessment:     Prominent orthopedic hardware  Left knee osteoarthritis  Left knee effusion  History of stroke  Hypertension  Tobacco use      Plan:     Natural history and expected course discussed. Questions answered. We reviewed his x-rays and discussed the findings of osteoarthritis as well as prominent hardware. We discussed nonoperative and operative treatment options. Discussed that given his significant osteoarthritis, I did not think that hardware removal alone would provide him with significant pain relief. The risks and benefits of an aspiration were discussed with the patient. The patient had full opportunity to ask questions and all were answered. The patient then provided verbal informed consent. The skin was then prepped with betadine solution and alcohol. Under aseptic conditions, the  left knee was aspirated of approximately 55 cc bloody fluid. Continue NSAIDs. Recommend ice. He smokes, and and I discussed with the patient the risks of smoking on general health and also on bone and soft tissue healing (increased risk of complications, delayed healing, delayed union and non-union, poorer outcomes), and he agreed to cut down or stop smoking. He will see Dr. Irwin Garcia for evaluation for knee replacement. Olivia Montanez. Mario Alberto Nath MD  Orthopaedic Surgery and Sports Medicine     Disclaimer: This note was generated with use of a verbal recognition program and an attempt was made to check for errors. It is possible that there are still dictated errors within this office note. If so, please bring any significant errors to my attention for an addendum. All efforts were made to ensure that this office note is accurate.

## 2022-03-30 ENCOUNTER — TELEPHONE (OUTPATIENT)
Dept: CASE MANAGEMENT | Age: 62
End: 2022-03-30

## 2022-03-30 NOTE — TELEPHONE ENCOUNTER
CT Lung Cancer Screening completed on 3/17/2022, ordering provider, Dr Aixa Conway routed radiology results with recommendations. Smoking history reviewed.

## 2022-04-03 DIAGNOSIS — E55.9 VITAMIN D DEFICIENCY: ICD-10-CM

## 2022-04-04 RX ORDER — CALCIUM CARBONATE/VITAMIN D3 600 MG-20
TABLET ORAL
Qty: 30 CAPSULE | Refills: 2 | Status: SHIPPED | OUTPATIENT
Start: 2022-04-04 | End: 2022-06-06 | Stop reason: SDUPTHER

## 2022-04-05 DIAGNOSIS — M25.462 PAIN AND SWELLING OF KNEE, LEFT: ICD-10-CM

## 2022-04-05 DIAGNOSIS — M25.562 PAIN AND SWELLING OF KNEE, LEFT: ICD-10-CM

## 2022-04-05 RX ORDER — DICLOFENAC SODIUM 75 MG/1
TABLET, DELAYED RELEASE ORAL
Qty: 60 TABLET | Refills: 0 | Status: SHIPPED | OUTPATIENT
Start: 2022-04-05 | End: 2022-06-06

## 2022-04-21 ENCOUNTER — OFFICE VISIT (OUTPATIENT)
Dept: ORTHOPEDIC SURGERY | Age: 62
End: 2022-04-21
Payer: COMMERCIAL

## 2022-04-21 VITALS
RESPIRATION RATE: 16 BRPM | SYSTOLIC BLOOD PRESSURE: 180 MMHG | WEIGHT: 160 LBS | HEIGHT: 65 IN | HEART RATE: 63 BPM | BODY MASS INDEX: 26.66 KG/M2 | DIASTOLIC BLOOD PRESSURE: 95 MMHG

## 2022-04-21 DIAGNOSIS — T84.498A EXPOSED ORTHOPAEDIC HARDWARE (HCC): ICD-10-CM

## 2022-04-21 DIAGNOSIS — Z72.0 TOBACCO ABUSE: ICD-10-CM

## 2022-04-21 DIAGNOSIS — M17.12 ARTHRITIS OF LEFT KNEE: Primary | ICD-10-CM

## 2022-04-21 DIAGNOSIS — M11.20 CHONDROCALCINOSIS: ICD-10-CM

## 2022-04-21 PROCEDURE — G8419 CALC BMI OUT NRM PARAM NOF/U: HCPCS | Performed by: ORTHOPAEDIC SURGERY

## 2022-04-21 PROCEDURE — 99214 OFFICE O/P EST MOD 30 MIN: CPT | Performed by: ORTHOPAEDIC SURGERY

## 2022-04-21 PROCEDURE — 4004F PT TOBACCO SCREEN RCVD TLK: CPT | Performed by: ORTHOPAEDIC SURGERY

## 2022-04-21 PROCEDURE — 3017F COLORECTAL CA SCREEN DOC REV: CPT | Performed by: ORTHOPAEDIC SURGERY

## 2022-04-21 PROCEDURE — G8427 DOCREV CUR MEDS BY ELIG CLIN: HCPCS | Performed by: ORTHOPAEDIC SURGERY

## 2022-04-21 NOTE — LETTER
Dr Khadijah Fairchild [phone] F: [phone]  Surgery Scheduling Form:  DEMOGRAPHICS:                                                                                                              .    Patient Name:  Bradley Penaloza    Patient :  1960   Patient SS#:  [id number]      Patient Phone:  [phone] (home)      Patient Address:  [address]   [address] [place] [address]    Insurance:     DIAGNOSIS & PROCEDURE:                                                                                                Diagnosis:  Left knee arthritis M17.1***    Operation:  Left total knee replacement, hardware removal    Location:  [facility]    Surgeon:  Pamela Fitzpatrick    SCHEDULING INFORMATION:                                                                                             Requested Date:  Check cotinine   OR Time: ***   Patient Arrival Time: ***     OR Time Required:  90 minutes    Anesthesia:  General or Spinal;  NO BLOCK  SA Required:  Yes x 2    Equipment:  [employer] knee positioner.     Status: Same day       PAT Required:  Yes [id number] test:  ***    Latex Allergy:  {yes/no/unk:709586} Defibrilator or Pacemaker:  {yes/no/unk:059389}    Isolation Precautions:  {yes/no/unk:795431}                    Shashi Shabazz MD     22   BILLING INFORMATION:                                                                                                    .                          CPT Code Modifier  Total knee    Prior auth:  [id number]

## 2022-04-21 NOTE — PROGRESS NOTES
RAPT  RISK ASSESSMENT and PREDICTION TOOL    Name: Nila Hanson  YOB: 1960  Surgeon: Miguel Angel Wilson MD         Value Score    1). What is your age group? 50 - 65 years  = 2      66 - 75 years = 1     > 75 years = 0       Your score = 2   2). Gender? Male = 2     Female = 1       Your score = 2   3). How far on average can you walk? Two blocks or more (+/- rest) = 2    (a block is 200 gcpxdq=628 ft)  1 - 2 blocks (+/- rest) = 1     Housebound (most of time) = 0       Your score = 2   4). Which gait aid do you use? None = 2    (more often than not) Single-point stick = 1     Crutches/walker = 0       Your score = 2   5). Do you use community supports? None or one per week = 1    (home help, meals on wheels, district nursing) Two or more per week = 0       Your score = 1   6). Will you live with someone who can care for you after your operation? yes = 3     no = 0       Your score = 3    Your Total Score (out of 12) = 12       Key: Destination at discharge from acute care predicted by score.   Score < 6  = extended inpatient rehabilitation  Score 6 - 9  = additional intervention to discharge directly home (Rehab in the home)  Score > 9  = directly home      Patient's Preference Prediction Score Agreed destination   open 12 home   Nila Hanson  Date: 4-21-22     Lives with girlfriend

## 2022-04-21 NOTE — LETTER
[facility]  [address] [address]  Phone: [phone]  Fax: [phone]    Ghazala Clement MD    April 21, 2022     Ariel Luna MD  [phone] [facility] [address]    Patient: Linda Mcadams   MR Number: [id number]   YOB: 1960   Date of Visit: 4/21/2022       Dear Ariel Luna: Thank you for referring Linda Mcadams to me for evaluation/treatment. Below are the relevant portions of my assessment and plan of care. If you have questions, please do not hesitate to call me. I look forward to following Tonie Valdivia along with you.     Sincerely,      Ghazala Clement MD

## 2022-05-11 NOTE — FLOWSHEET NOTE
[facility] - Outpatient Rehabilitation & Therapy  [address] [facility]  Phone: [phone]   Fax:     [phone]      Physical Therapy Treatment Note/ Progress Report:     Date:  2022    Patient Name:  Stephany Roger    :  1960  MRN: [id number]    Pertinent Medical History:Additional Pertinent Hx: cigarette smoker, htn, cerebral artery occlusion with infarction, anuerism, arthritis    Medical/Treatment Diagnosis Information:  · Diagnosis: M17.12 (ICD-10-CM) - Arthritis of left [id number] (ICD-10-CM) - Exposed orthopaedic hardware ([address]  · Treatment Diagnosis: decreased ability to ambulate and function    Insurance/Certification information:  PT Insurance Information: [facility]  Physician Information:  Referring Provider (secondary): Dr. Miguel Ángel Denny of care signed (Y/N): routed    Date of Patient follow up with Physician:      Progress Report: []  Yes  [x]  No     Date Range for reporting period:  Beginnin2022  Ending:      Progress report due (10 Rx/or 30 days whichever is EJZP):69    Recertification due (POC duration/ or 90 days whichever is less):      Visit # POC/Insurance Allowable Auth Needed   1 1 []Yes   []No     Latex Allergy:  [x]NO      []YES  Preferred Language for Healthcare:   [x]English       []Other:    Functional Scale:      Date assessed: at eval  Test: FOTO-67  Score:    Pain level:  6/10     History of Injury: Patient is a 64 y/o male with a hx of left knee pain since he was 14years old and had a knee surgery/.  He has gotten progressively worse over the years. He has 2 pins in it and one is coming out. He says it is not too weak and does not give out but he is in constant pain. 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 MD wants to do a total knee if he can quit smoking.                SUBJECTIVE: Pt states, \" I'm hoping to put off the total knee for a while \"    OBJECTIVE:   Initial- flex-105, ext--10, strength-4-/5    RESTRICTIONS/PRECAUTIONS:     Exercises/Interventions:     Therapeutic Ex (14549)   Min: Reps/Resistance Notes/CUES   bike     lef press     Knee ext     Prone curls     Sl circles                                             Manual Intervention (63253)  Min:15 Deep mfr to quads, hams, itb, tfl, gastroc, add, patellar mobs gr 4,  prom Try iastm, massage gun, medi cups   Knee mobs/PROM     Tib/Fem Mobs     Patella Mobs     Ankle mobs               NMR re-education (74262)  Min:  CUES NEEDED   Semi squats     Side steps     Step tap/downs     bosu ski     sls                    Therapeutic Activity (68788)  Min: Discussed mechanism of injury, anatomy, physiology, biomechanics, sleeping positions,  and strategies to accelerate the healing process. Answered all of patients questions regarding injury. Gave necessary information to get any equipment needed. Modalities  Min:     IFC with      CP after exercises     MH after exercises            Other Therapeutic Activities: Pt was educated on PT POC, Diagnosis, Prognosis, pathomechanics as well as frequency and duration of scheduling future physical therapy appointments. Time was also taken on this day to answer all patient questions and participation in PT. Reviewed appointment policy in detail with patient and patient verbalized understanding. Home Exercise Program: Patient was instructed in the following for HEP:     . Patient verbalized/demonstrated understanding and was issued written handout. Access Code: [email or web address]  URL: [email or web address]  Date: 05/12/2022  Prepared by: Michael Curry    Exercises  Prone Quadriceps Stretch with Strap - 1 x daily - 7 x weekly - 3 sets - 10 reps  Standing Hamstring Stretch on Chair - 1 x daily - 7 x weekly - 3 sets - 10 reps  Gastroc Stretch on Step - 1 x daily - 7 x weekly - 3 sets - 10 reps  Prone Hip Extension - 1 x daily - 7 x weekly - 3 sets - 10 reps  Active Straight Leg Raise with Quad Set - 1 x daily - 7 x weekly - 3 sets - 10 reps      Therapeutic Exercise and NMR EXR  [] (38933) Provided verbal/tactile cueing for activities related to strengthening, flexibility, endurance, ROM for improvements in LE, proximal hip, and core control with self care, mobility, lifting, ambulation.  [] (57405) Provided verbal/tactile cueing for activities related to improving balance, coordination, kinesthetic sense, posture, motor skill, proprioception  to assist with LE, proximal hip, and core control in self care, mobility, lifting, ambulation and eccentric single leg control.      NMR and Therapeutic Activities:    [] (44952 or 42590) Provided verbal/tactile cueing for activities related to improving balance, coordination, kinesthetic sense, posture, motor skill, proprioception and motor activation to allow for proper function of core, proximal hip and LE with self care and ADLs and functional mobility.   [] (51905) Gait Re-education- Provided training and instruction to the patient for proper LE, core and proximal hip recruitment and positioning and eccentric body weight control with ambulation re-education including up and down stairs     Home Exercise Program:    [x] (59146) Reviewed/Progressed HEP activities related to strengthening, flexibility, endurance, ROM of core, proximal hip and LE for functional self-care, mobility, lifting and ambulation/stair navigation   [] (08802)Reviewed/Progressed HEP activities related to improving balance, coordination, kinesthetic sense, posture, motor skill, proprioception of core, proximal hip and LE for self care, mobility, lifting, and ambulation/stair navigation      Manual Treatments:  PROM / STM / Oscillations-Mobs:  G-I, II, III, IV (PA's, Inf., Post.)  [] (83429) Provided manual therapy to mobilize LE, proximal hip and/or LS spine soft tissue/joints for the purpose of modulating pain, promoting relaxation,  increasing ROM, reducing/eliminating soft tissue swelling/inflammation/restriction, improving soft tissue extensibility and allowing for proper ROM for normal function with self care, mobility, lifting and ambulation. If [facility] Please Indicate Time In/Out  CPT Code Time in Time out                         Approval Dates:  CPT Code Units Approved Units Used  Date Updated:                     Charges:  Timed Code Treatment Minutes: 30   Total Treatment Minutes: 50      [x] EVAL (LOW) 57397 (typically 20 minutes face-to-face)  [] EVAL (MOD) 87794 (typically 30 minutes face-to-face)  [] EVAL (HIGH) 74700 (typically 45 minutes face-to-face)  [] RE-EVAL     [x] NE(08367) x  1  [] Dry needle 1 or 2 Muscles (11608)  [] NMR (34661) x     [] Dry needle 3+ Muscles (24867)  [x] Manual (39436) x1     [] Ultrasound (13237) x  [] TA (68962) x     [] Mech Traction (25591)  [] ES(attended) (83128)     [] ES (un) (13040):   [] Vasopump (59823) [] Ionto (78235)   [] Other:    GOALS:  Patient stated goal: \" I want to walk and function better \"  []? Progressing: []? Met: []? Not Met: []? Adjusted     Therapist goals for Patient:   Short Term Goals: To be achieved in: 2 weeks  1. Independent in HEP and progression per patient tolerance, in order to prevent re-injury. []? Progressing: []? Met: []? Not Met: []? Adjusted  2. Patient will have a decrease in pain to facilitate improvement in movement, function, and ADLs as indicated by Functional Deficits. []? Progressing: []? Met: []? Not Met: []? Adjusted     Long Term Goals: To be achieved in: 8 weeks  1. Increase FOTO functional outcome score from 67 to 75  to assist with reaching prior level of function. []? Progressing: []? Met: []? Not Met: []? Adjusted  2. Patient will demonstrate increased AROM to wfl to allow for proper joint functioning as indicated by patients Functional Deficits. []? Progressing: []? Met: []? Not Met: []? Adjusted  3.  Patient will demonstrate an increase in Strength to good proximal hip strength and control, within 5lb HHD in LE to allow for proper functional mobility as indicated by patients Functional Deficits. []? Progressing: []? Met: []? Not Met: []? Adjusted  4. Patient will return to normal function functional activities without increased symptoms or restriction. []? Progressing: []? Met: []? Not Met: []? Adjusted  5. (patient specific functional goal)    []? Progressing: []? Met: []? Not Met: []? Adjusted            ASSESSMENT:  See eval    Treatment/Activity Tolerance:  [x] Patient tolerated treatment well [] Patient limited by fatique  [] Patient limited by pain  [] Patient limited by other medical complications  [] Other:     Overall Progression Towards Functional goals/ Treatment Progress Update:  [] Patient is progressing as expected towards functional goals listed. [] Progression is slowed due to complexities/Impairments listed. [] Progression has been slowed due to co-morbidities. [x] Plan just implemented, too soon to assess goals progression <30days   [] Goals require adjustment due to lack of progress  [] Patient is not progressing as expected and requires additional follow up with physician  [] Other    Prognosis for POC: [x] Good [] Fair  [] Poor    Patient requires continued skilled intervention: [x] Yes  [] No        PLAN: LE arom, prom  strength, proprioception, gait, balance, functional activities. Mfr, joint mobs, mods as needed, hep. Progress as tolerated, Very tight and tender in le muscles. Loosen muscles , increase flexibility , strength, propio, ecc control.      [] Continue per plan of care [] Alter current plan (see comments)  [x] Plan of care initiated [] Hold pending MD visit [] Discharge    Electronically signed by: Arthuro Blacna, PT    Note: If patient does not return for scheduled/recommended follow up visits, this note will serve as a discharge from care along with the most recent update on progress.

## 2022-05-11 NOTE — PLAN OF CARE
[facility]. [address]  Phone: [phone]   Fax:     [phone]                                                       Physical Therapy Certification    Dear Referring Provider (secondary): Dr. Homero Magallon think this patient may benefit from dry needling and may attempt it at some point. .  Please sign the following if you are in agreement with this. If you have any reservations or questions please contact me at [phone]  Thanks, Sincerely, Veryl Sandy PT      We had the pleasure of evaluating the following patient for physical therapy services at [facility]. A summary of our findings can be found in the initial assessment below. This includes our plan of care. If you have any questions or concerns regarding these findings, please do not hesitate to contact me at the office phone number checked above.   Thank you for the referral.       Physician Signature:_______________________________Date:__________________  By signing above (or electronic signature), therapists plan is approved by physician          Patient: Gilels Cedillo   : 1960   MRN: [id number]  Referring Physician: Referring Provider (secondary): Dr. Brandee Castellanos      Evaluation Date: 2022      Medical Diagnosis Information:  Diagnosis: M17.12 (ICD-10-CM) - Arthritis of left NFLMT18.398K (ICD-10-CM) - Exposed orthopaedic hardware ([address]   Treatment Diagnosis: decreased ability to ambulate and function                                         Insurance information: PT Insurance Information: caresource     Precautions/ Contra-indications:  Latex Allergy:  [x]NO      []YES  Preferred Language for Healthcare:   [x]English       []other:    C-SSRS Triggered by Intake questionnaire (Past 2 wk assessment ):   [x] No, Questionnaire did not trigger screening.   [] Yes, Patient intake triggered C-SSRS Screening      [] C-SSRS Screening completed  [] PCP notified via Epic     SUBJECTIVE: Patient is a 64 y/o male with a hx of left knee pain since he was 14years old and had a knee surgery/.  He has gotten progressively worse over the years. He has 2 pins in it and one is coming out. He says it is not too weak and does not give out but he is in constant pain. 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 MD wants to do a total knee if he can quit smoking. 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 Decreased patellar mob,         Gait: - ambulates with supination on hte left, circum ducting  gait, decreased heel strike, stance, and push off.      ROM LEFT RIGHT   HIP Flex 100 wfl all   HIP Abd 20    HIP Ext 5    HIP IR 20    HIP ER 30    Knee ext -10    Knee Flex 105    Prone flex 80    Ankle PF 25    Ankle DF 5    Ankle In 30    Ankle Ev 10    Strength  LEFT RIGHT   HIP Flexors 4 5/5 all   HIP Abductors 4    HIP Ext 4    Hip ER 4    Knee EXT (quad) 4-    Knee Flex (HS) 4-    Ankle DF 4    Ankle PF 4    Ankle Inv 4    Ankle EV 4         Circumference  Mid apex  7 cm prox   42 cm          Reflexes/Sensation:    [x]Dermatomes/Myotomes intact    [x]Reflexes equal and normal bilaterally   []Other:    Joint mobility:   []Normal    [x]Hypo   []Hyper    Orthopedic Special Tests:Patellar grind and compression - painful, Collaterals- intact, Ant and post drawer - neg, Mcmurrays- neg                       [x] Patient history, allergies, meds reviewed. Medical chart reviewed. See intake form. Review Of Systems (ROS):  [x]Performed Review of systems (Integumentary, CardioPulmonary, Neurological) by intake and observation. Intake form has been scanned into medical record. Patient has been instructed to contact their primary care physician regarding ROS issues if not already being addressed at this time.       Co-morbidities/Complexities (which will affect course of rehabilitation):   []None           Arthritic conditions   []Rheumatoid arthritis (M05.9)  [x]Osteoarthritis (M19.91)   Cardiovascular conditions   [x]Hypertension (I10)  []Hyperlipidemia (E78.5)  []Angina pectoris (I20)  []Atherosclerosis (I70)  []CVA Musculoskeletal conditions   []Disc pathology   []Congenital spine pathologies   []Prior surgical intervention  []Osteoporosis (M81.8)  []Osteopenia (M85.8)   Endocrine conditions   []Hypothyroid (E03.9)  []Hyperthyroid Gastrointestinal conditions   []Constipation (X20.30)   Metabolic conditions   []Morbid obesity (E66.01)  []Diabetes type 1(E10.65) or 2 (E11.65)   []Neuropathy (G60.9)     Pulmonary conditions   []Asthma (J45)  []Coughing   []COPD (J44.9)   Psychological Disorders  []Anxiety (F41.9)  []Depression (F32.9)   []Other:   [x]Other:  Aneurism, cerebral artery occlusion, ,         Barriers to/and or personal factors that will affect rehab potential:              []Age  []Sex    [x]Smoker              []Motivation/Lack of Motivation                        []Co-Morbidities              []Cognitive Function, education/learning barriers              []Environmental, home barriers              []profession/work barriers  [x]past PT/medical experience  []other:  Justification:     Falls Risk Assessment (30 days):   [x] Falls Risk assessed and no intervention required. [] Falls Risk assessed and Patient requires intervention due to being higher risk   TUG score (>12s at risk):     [] Falls education provided, including         ASSESSMENT:   Functional Impairments:     [x]Noted lumbar/proximal hip/LE hypomobility   [x]Decreased LE functional ROM   []Decreased core/proximal hip strength and neuromuscular control   [x]Decreased LE functional strength   [x]Reduced balance/proprioceptive control   []other:      Functional Activity Limitations (from functional questionnaire and intake)   []Reduced ability to tolerate prolonged functional positions   []Reduced ability or difficulty with changes of positions or transfers between positions   []Reduced ability to maintain good posture and demonstrate good body mechanics with sitting, bending, and lifting   []Reduced ability to sleep   [x] Reduced ability or tolerance with driving and/or computer work   [x]Reduced ability to perform lifting, carrying tasks   [x]Reduced ability to squat   []Reduced ability to forward bend   [x]Reduced ability to ambulate prolonged functional periods/distances/surfaces   [x]Reduced ability to ascend/descend stairs   [x]Reduced ability to run, hop or jump   []other:     Participation Restrictions   []Reduced participation in self care activities   [x]Reduced participation in home management activities   [x]Reduced participation in work activities   [x]Reduced participation in social activities. [x]Reduced participation in sport activities. Classification :    []Signs/symptoms consistent with post-surgical status including decreased ROM, strength and function.    []Signs/symptoms consistent with joint sprain/strain   []Signs/symptoms consistent with patella-femoral syndrome   [x]Signs/symptoms consistent with knee OA/hip OA   []Signs/symptoms consistent with internal derangement of knee/Hip   []Signs/symptoms consistent with functional hip weakness/NMR control      []Signs/symptoms consistent with tendinitis/tendinosis    [x]signs/symptoms consistent with pathology which may benefit from Dry needling      []other:      Prognosis/Rehab Potential:      []Excellent   [x]Good    [x]Fair   []Poor    Tolerance of evaluation/treatment:    []Excellent   [x]Good    [x]Fair   []Poor    Physical Therapy Evaluation Complexity Justification  [x] A history of present problem with:  [] no personal factors and/or comorbidities that impact the plan of care;  [x]1-2 personal factors and/or comorbidities that impact the plan of care  []3 personal factors and/or comorbidities that impact the plan of care  [x] An examination of body systems using standardized tests and measures addressing any of the following: body structures and functions (impairments), activity limitations, and/or participation restrictions;:  [] a total of 1-2 or more elements   [] a total of 3 or more elements   [x] a total of 4 or more elements   [x] A clinical presentation with:  [] stable and/or uncomplicated characteristics   [x] evolving clinical presentation with changing characteristics  [] unstable and unpredictable characteristics;   [x] Clinical decision making of [] low, [] moderate, [] high complexity using standardized patient assessment instrument and/or measurable assessment of functional outcome. [x] EVAL (LOW) 64445 (typically 20 minutes face-to-face)  [] EVAL (MOD) 15745 (typically 30 minutes face-to-face)  [] EVAL (HIGH) 88743 (typically 45 minutes face-to-face)  [] RE-EVAL      PLAN:  Frequency/Duration:  2 days per week for 8 Weeks:  Interventions:  [x]  Therapeutic exercise including: strength training, ROM, for Lower extremity and core   [x]  NMR activation and proprioception for LE, Glutes and Core   [x]  Manual therapy as indicated for LE, Hip and spine to include: Dry Needling/IASTM, STM, PROM, Gr I-IV mobilizations, manipulation.    [x] Modalities as needed that may include: thermal agents, E-stim, Biofeedback, US, iontophoresis as indicated  [x] Patient education on joint protection, postural re-education, activity modification, progression of HEP. HEP instruction: (see scanned forms)    GOALS:  Patient stated goal: \" I want to walk and function better \"  [] Progressing: [] Met: [] Not Met: [] Adjusted    Therapist goals for Patient:   Short Term Goals: To be achieved in: 2 weeks  1. Independent in HEP and progression per patient tolerance, in order to prevent re-injury. [] Progressing: [] Met: [] Not Met: [] Adjusted  2. Patient will have a decrease in pain to facilitate improvement in movement, function, and ADLs as indicated by Functional Deficits. [] Progressing: [] Met: [] Not Met: [] Adjusted    Long Term Goals: To be achieved in: 8 weeks  1. Increase FOTO functional outcome score from 67 to 75  to assist with reaching prior level of function. [] Progressing: [] Met: [] Not Met: [] Adjusted  2. Patient will demonstrate increased AROM to wfl to allow for proper joint functioning as indicated by patients Functional Deficits. [] Progressing: [] Met: [] Not Met: [] Adjusted  3. Patient will demonstrate an increase in Strength to good proximal hip strength and control, within 5lb HHD in LE to allow for proper functional mobility as indicated by patients Functional Deficits. [] Progressing: [] Met: [] Not Met: [] Adjusted  4. Patient will return to normal function functional activities without increased symptoms or restriction. [] Progressing: [] Met: [] Not Met: [] Adjusted  5. (patient specific functional goal)    [] Progressing: [] Met: [] Not Met: [] Adjusted     Electronically signed by:  Riddhi Rivero PT      Note: If patient does not return for scheduled/recommended follow up visits, this note will serve as a discharge from care along with the most recent update on progress.

## 2022-05-12 ENCOUNTER — HOSPITAL ENCOUNTER (OUTPATIENT)
Dept: PHYSICAL THERAPY | Age: 62
Setting detail: THERAPIES SERIES
Discharge: HOME | End: 2022-05-12
Payer: COMMERCIAL

## 2022-05-12 PROCEDURE — 97140 MANUAL THERAPY 1/> REGIONS: CPT

## 2022-05-12 PROCEDURE — 97161 PT EVAL LOW COMPLEX 20 MIN: CPT

## 2022-05-12 PROCEDURE — 97110 THERAPEUTIC EXERCISES: CPT

## 2022-05-16 ENCOUNTER — HOSPITAL ENCOUNTER (OUTPATIENT)
Dept: PHYSICAL THERAPY | Age: 62
Setting detail: THERAPIES SERIES
Discharge: HOME | End: 2022-05-16
Payer: COMMERCIAL

## 2022-05-16 PROCEDURE — 97140 MANUAL THERAPY 1/> REGIONS: CPT

## 2022-05-16 PROCEDURE — 97110 THERAPEUTIC EXERCISES: CPT

## 2022-05-16 NOTE — FLOWSHEET NOTE
off the total knee for a while \"  05/16/22: Patient reports knee is feels stiff after work. OBJECTIVE:   Initial- flex-105, ext--10, strength-4-/5    RESTRICTIONS/PRECAUTIONS:     Exercises/Interventions:     Therapeutic Ex (60658)   Min: Reps/Resistance Notes/CUES   Bike seat 11  L 5 x 5 min     lef press 60 # 2 x 20    Knee ext     Prone curls 30 x     Sl circles 20 x each    incline 3 x 30 sec                                        Manual Intervention (69925)  Min:15 Deep mfr to quads, hams, itb, tfl, gastroc,used massage roller add, patellar mobs gr 4,  prom Try iastm, massage gun, medi cups   Knee mobs/PROM     Tib/Fem Mobs     Patella Mobs     Ankle mobs               NMR re-education (18151)  Min:  CUES NEEDED   Semi squats     Side steps     Step tap/downs     bosu ski     sls                    Therapeutic Activity (30102)  Min: Discussed mechanism of injury, anatomy, physiology, biomechanics, sleeping positions,  and strategies to accelerate the healing process. Answered all of patients questions regarding injury. Gave necessary information to get any equipment needed. Modalities  Min:     IFC with      CP after exercises     MH after exercises            Other Therapeutic Activities: Pt was educated on PT POC, Diagnosis, Prognosis, pathomechanics as well as frequency and duration of scheduling future physical therapy appointments. Time was also taken on this day to answer all patient questions and participation in PT. Reviewed appointment policy in detail with patient and patient verbalized understanding. Home Exercise Program: Patient was instructed in the following for HEP:     . Patient verbalized/demonstrated understanding and was issued written handout. Access Code: [email or web address]  URL: [email or web address]  Date: 05/12/2022  Prepared by: Savannah Pfeiffer    Exercises  Prone Quadriceps Stretch with Strap - 1 x daily - 7 x weekly - 3 sets - 10 reps  Standing Hamstring Stretch on Chair - 1 x daily - 7 x weekly - 3 sets - 10 reps  Gastroc Stretch on Step - 1 x daily - 7 x weekly - 3 sets - 10 reps  Prone Hip Extension - 1 x daily - 7 x weekly - 3 sets - 10 reps  Active Straight Leg Raise with Quad Set - 1 x daily - 7 x weekly - 3 sets - 10 reps      Therapeutic Exercise and NMR EXR  [] (85352) Provided verbal/tactile cueing for activities related to strengthening, flexibility, endurance, ROM for improvements in LE, proximal hip, and core control with self care, mobility, lifting, ambulation.  [] (09821) Provided verbal/tactile cueing for activities related to improving balance, coordination, kinesthetic sense, posture, motor skill, proprioception  to assist with LE, proximal hip, and core control in self care, mobility, lifting, ambulation and eccentric single leg control.      NMR and Therapeutic Activities:    [] (08956 or 59012) Provided verbal/tactile cueing for activities related to improving balance, coordination, kinesthetic sense, posture, motor skill, proprioception and motor activation to allow for proper function of core, proximal hip and LE with self care and ADLs and functional mobility.   [] (52292) Gait Re-education- Provided training and instruction to the patient for proper LE, core and proximal hip recruitment and positioning and eccentric body weight control with ambulation re-education including up and down stairs     Home Exercise Program:    [x] (76930) Reviewed/Progressed HEP activities related to strengthening, flexibility, endurance, ROM of core, proximal hip and LE for functional self-care, mobility, lifting and ambulation/stair navigation   [] (44358)Reviewed/Progressed HEP activities related to improving balance, coordination, kinesthetic sense, posture, motor skill, proprioception of core, proximal hip and LE for self care, mobility, lifting, and ambulation/stair navigation      Manual Treatments:  PROM / STM / Oscillations-Mobs:  G-I, II, III, IV (PA's, Inf., Post.)  [] (10033) Provided manual therapy to mobilize LE, proximal hip and/or LS spine soft tissue/joints for the purpose of modulating pain, promoting relaxation,  increasing ROM, reducing/eliminating soft tissue swelling/inflammation/restriction, improving soft tissue extensibility and allowing for proper ROM for normal function with self care, mobility, lifting and ambulation. If BWC Please Indicate Time In/Out  CPT Code Time in Time out                         Approval Dates:  CPT Code Units Approved Units Used  Date Updated:     96 6               Charges:  Timed Code Treatment Minutes: 45   Total Treatment Minutes: 45      [] EVAL (LOW) 19807 (typically 20 minutes face-to-face)  [] EVAL (MOD) 51280 (typically 30 minutes face-to-face)  [] EVAL (HIGH) 44982 (typically 45 minutes face-to-face)  [] RE-EVAL     [x] MR(84349) x  2  [] Dry needle 1 or 2 Muscles (59822)  [] NMR (05201) x     [] Dry needle 3+ Muscles (08078)  [x] Manual (31530) x1     [] Ultrasound (20065) x  [] TA (90517) x     [] Mech Traction (49927)  [] ES(attended) (43314)     [] ES (un) (85333):   [] Vasopump (21559) [] Ionto (60579)   [] Other:    GOALS:  Patient stated goal: \" I want to walk and function better \"  []? Progressing: []? Met: []? Not Met: []? Adjusted     Therapist goals for Patient:   Short Term Goals: To be achieved in: 2 weeks  1. Independent in HEP and progression per patient tolerance, in order to prevent re-injury. []? Progressing: []? Met: []? Not Met: []? Adjusted  2. Patient will have a decrease in pain to facilitate improvement in movement, function, and ADLs as indicated by Functional Deficits. []? Progressing: []? Met: []? Not Met: []? Adjusted     Long Term Goals: To be achieved in: 8 weeks  1. Increase FOTO functional outcome score from 67 to 75  to assist with reaching prior level of function. []? Progressing: []? Met: []? Not Met: []? Adjusted  2.  Patient will demonstrate increased AROM to wfl to allow for proper joint functioning as indicated by patients Functional Deficits. []? Progressing: []? Met: []? Not Met: []? Adjusted  3. Patient will demonstrate an increase in Strength to good proximal hip strength and control, within 5lb HHD in LE to allow for proper functional mobility as indicated by patients Functional Deficits. []? Progressing: []? Met: []? Not Met: []? Adjusted  4. Patient will return to normal function functional activities without increased symptoms or restriction. []? Progressing: []? Met: []? Not Met: []? Adjusted  5. (patient specific functional goal)    []? Progressing: []? Met: []? Not Met: []? Adjusted            ASSESSMENT:  See eval    Treatment/Activity Tolerance:  [x] Patient tolerated treatment well [] Patient limited by fatique  [] Patient limited by pain  [] Patient limited by other medical complications  [] Other:     Overall Progression Towards Functional goals/ Treatment Progress Update:  [] Patient is progressing as expected towards functional goals listed. [] Progression is slowed due to complexities/Impairments listed. [] Progression has been slowed due to co-morbidities. [x] Plan just implemented, too soon to assess goals progression <30days   [] Goals require adjustment due to lack of progress  [] Patient is not progressing as expected and requires additional follow up with physician  [] Other    Prognosis for POC: [x] Good [] Fair  [] Poor    Patient requires continued skilled intervention: [x] Yes  [] No        PLAN: LE arom, prom  strength, proprioception, gait, balance, functional activities. Mfr, joint mobs, mods as needed, hep. Progress as tolerated, Very tight and tender in le muscles. Loosen muscles , increase flexibility , strength, propio, ecc control.      [] Continue per plan of care [] Alter current plan (see comments)  [x] Plan of care initiated [] Hold pending MD visit [] Discharge    Electronically signed by: Candy Ann PTA    Note: If patient does not return for scheduled/recommended follow up visits, this note will serve as a discharge from care along with the most recent update on progress.

## 2022-05-18 ENCOUNTER — HOSPITAL ENCOUNTER (OUTPATIENT)
Dept: PHYSICAL THERAPY | Age: 62
Setting detail: THERAPIES SERIES
Discharge: HOME | End: 2022-05-18
Payer: COMMERCIAL

## 2022-05-18 PROCEDURE — 97110 THERAPEUTIC EXERCISES: CPT

## 2022-05-18 PROCEDURE — 97140 MANUAL THERAPY 1/> REGIONS: CPT

## 2022-05-18 NOTE — FLOWSHEET NOTE
[facility] - Outpatient Rehabilitation & Therapy  [address] [facility]. [place]  Phone: [phone]   Fax:     [phone]      Physical Therapy Treatment Note/ Progress Report:     Date:  2022    Patient Name:  Keke Porras    :  1960  MRN: [id number]    Pertinent Medical History:Additional Pertinent Hx: cigarette smoker, htn, cerebral artery occlusion with infarction, anuerism, arthritis    Medical/Treatment Diagnosis Information:  · Diagnosis: M17.12 (ICD-10-CM) - Arthritis of left EXOTK57.726H (ICD-10-CM) - Exposed orthopaedic hardware ([address]  · Treatment Diagnosis: decreased ability to ambulate and function    Insurance/Certification information:  PT Insurance Information: [facility]  Physician Information:  Referring Provider (secondary): Dr. Janessa Goldstein of care signed (Y/N): routed    Date of Patient follow up with Physician:      Progress Report: []  Yes  [x]  No     Date Range for reporting period:  Beginnin2022  Ending:      Progress report due (10 Rx/or 30 days whichever is SYLVAIN):    Recertification due (POC duration/ or 90 days whichever is less):      Visit # POC/Insurance Allowable Auth Needed   3 96 units till 22 []Yes   []No     Latex Allergy:  [x]NO      []YES  Preferred Language for Healthcare:   [x]English       []Other:    Functional Scale:      Date assessed: at eval  Test: FOTO-67  Score:    Pain level:  6/10     History of Injury: Patient is a 62 y/o male with a hx of left knee pain since he was 14years old and had a knee surgery/.  He has gotten progressively worse over the years. He has 2 pins in it and one is coming out. He says it is not too weak and does not give out but he is in constant pain. 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 MD wants to do a total knee if he can quit smoking.                SUBJECTIVE: Pt states, \" I'm hoping to put off the total knee for a while \"  05/16/22: Patient reports knee is feels stiff after work. 5/18/22  Pt states, \" about the same, swelling isnt as bad \"    OBJECTIVE:   Initial- flex-105, ext--10, strength-4-/5    RESTRICTIONS/PRECAUTIONS:     Exercises/Interventions:     Therapeutic Ex (94331)   Min: Reps/Resistance Notes/CUES   Bike seat 11  L 5 x 5 min     leg press 60 # 2 x 20    slr X 30    Knee ext     Prone le ext X 20    Prone curls 5# x 30    Sl circles 20 x each    incline 3 x 30 sec                                        Manual Intervention (00708)  Min:15 Deep mfr to quads, hams, itb, tfl, gastroc,used massage roller add, patellar mobs gr 4,  Prom, iastm and medi cups x 4 min Try iastm, massage gun, medi cups   Knee mobs/PROM     Tib/Fem Mobs     Patella Mobs     Ankle mobs               NMR re-education (85964)  Min:  CUES NEEDED   Semi squats     Side steps     Step tap/downs     bosu ski     sls                    Therapeutic Activity (69798)  Min: Discussed mechanism of injury, anatomy, physiology, biomechanics, sleeping positions,  and strategies to accelerate the healing process. Answered all of patients questions regarding injury. Gave necessary information to get any equipment needed. Modalities  Min:     IFC with      CP after exercises     MH after exercises            Other Therapeutic Activities: Pt was educated on PT POC, Diagnosis, Prognosis, pathomechanics as well as frequency and duration of scheduling future physical therapy appointments. Time was also taken on this day to answer all patient questions and participation in PT. Reviewed appointment policy in detail with patient and patient verbalized understanding. Home Exercise Program: Patient was instructed in the following for HEP:     . Patient verbalized/demonstrated understanding and was issued written handout. Access Code: [email or web address]  URL: [email or web address]  Date: 05/12/2022  Prepared by: Giovanny Castro Merrill    Exercises  Prone Quadriceps Stretch with Strap - 1 x daily - 7 x weekly - 3 sets - 10 reps  Standing Hamstring Stretch on Chair - 1 x daily - 7 x weekly - 3 sets - 10 reps  Gastroc Stretch on Step - 1 x daily - 7 x weekly - 3 sets - 10 reps  Prone Hip Extension - 1 x daily - 7 x weekly - 3 sets - 10 reps  Active Straight Leg Raise with Quad Set - 1 x daily - 7 x weekly - 3 sets - 10 reps      Therapeutic Exercise and NMR EXR  [] (57723) Provided verbal/tactile cueing for activities related to strengthening, flexibility, endurance, ROM for improvements in LE, proximal hip, and core control with self care, mobility, lifting, ambulation.  [] (07461) Provided verbal/tactile cueing for activities related to improving balance, coordination, kinesthetic sense, posture, motor skill, proprioception  to assist with LE, proximal hip, and core control in self care, mobility, lifting, ambulation and eccentric single leg control.      NMR and Therapeutic Activities:    [] (95456 or 26997) Provided verbal/tactile cueing for activities related to improving balance, coordination, kinesthetic sense, posture, motor skill, proprioception and motor activation to allow for proper function of core, proximal hip and LE with self care and ADLs and functional mobility.   [] (46007) Gait Re-education- Provided training and instruction to the patient for proper LE, core and proximal hip recruitment and positioning and eccentric body weight control with ambulation re-education including up and down stairs     Home Exercise Program:    [x] (46872) Reviewed/Progressed HEP activities related to strengthening, flexibility, endurance, ROM of core, proximal hip and LE for functional self-care, mobility, lifting and ambulation/stair navigation   [] (65225)Reviewed/Progressed HEP activities related to improving balance, coordination, kinesthetic sense, posture, motor skill, proprioception of core, proximal hip and LE for self care, mobility, lifting, and ambulation/stair navigation      Manual Treatments:  PROM / STM / Oscillations-Mobs:  G-I, II, III, IV (PA's, Inf., Post.)  [] (65163) Provided manual therapy to mobilize LE, proximal hip and/or LS spine soft tissue/joints for the purpose of modulating pain, promoting relaxation,  increasing ROM, reducing/eliminating soft tissue swelling/inflammation/restriction, improving soft tissue extensibility and allowing for proper ROM for normal function with self care, mobility, lifting and ambulation. If BW Please Indicate Time In/Out  CPT Code Time in Time out                         Approval Dates:  CPT Code Units Approved Units Used  Date Updated:     96 9               Charges:  Timed Code Treatment Minutes: 45   Total Treatment Minutes: 45      [] EVAL (LOW) 73637 (typically 20 minutes face-to-face)  [] EVAL (MOD) 66462 (typically 30 minutes face-to-face)  [] EVAL (HIGH) 67829 (typically 45 minutes face-to-face)  [] RE-EVAL     [x] DQ(52037) x  2  [] Dry needle 1 or 2 Muscles (83963)  [] NMR (70393) x     [] Dry needle 3+ Muscles (04083)  [x] Manual (54896) x1     [] Ultrasound (10324) x  [] TA (61401) x     [] Mech Traction (35280)  [] ES(attended) (11114)     [] ES (un) (74846):   [] Vasopump (52145) [] Ionto (07173)   [] Other:    GOALS:  Patient stated goal: \" I want to walk and function better \"  []? Progressing: []? Met: []? Not Met: []? Adjusted     Therapist goals for Patient:   Short Term Goals: To be achieved in: 2 weeks  1. Independent in HEP and progression per patient tolerance, in order to prevent re-injury. []? Progressing: []? Met: []? Not Met: []? Adjusted  2. Patient will have a decrease in pain to facilitate improvement in movement, function, and ADLs as indicated by Functional Deficits. []? Progressing: []? Met: []? Not Met: []? Adjusted     Long Term Goals: To be achieved in: 8 weeks  1.   Increase FOTO functional outcome score from 67 to 75  to assist with reaching prior level of function. []? Progressing: []? Met: []? Not Met: []? Adjusted  2. Patient will demonstrate increased AROM to wfl to allow for proper joint functioning as indicated by patients Functional Deficits. []? Progressing: []? Met: []? Not Met: []? Adjusted  3. Patient will demonstrate an increase in Strength to good proximal hip strength and control, within 5lb HHD in LE to allow for proper functional mobility as indicated by patients Functional Deficits. []? Progressing: []? Met: []? Not Met: []? Adjusted  4. Patient will return to normal function functional activities without increased symptoms or restriction. []? Progressing: []? Met: []? Not Met: []? Adjusted  5. (patient specific functional goal)    []? Progressing: []? Met: []? Not Met: []? Adjusted            ASSESSMENT:  See eval    Treatment/Activity Tolerance:  [x] Patient tolerated treatment well [] Patient limited by fatique  [] Patient limited by pain  [] Patient limited by other medical complications  [] Other:     Overall Progression Towards Functional goals/ Treatment Progress Update:  [] Patient is progressing as expected towards functional goals listed. [] Progression is slowed due to complexities/Impairments listed. [] Progression has been slowed due to co-morbidities. [x] Plan just implemented, too soon to assess goals progression <30days   [] Goals require adjustment due to lack of progress  [] Patient is not progressing as expected and requires additional follow up with physician  [] Other    Prognosis for POC: [x] Good [] Fair  [] Poor    Patient requires continued skilled intervention: [x] Yes  [] No        PLAN: LE arom, prom  strength, proprioception, gait, balance, functional activities. Mfr, joint mobs, mods as needed, hep. Progress as tolerated, Very tight and tender in le muscles. Loosen muscles , increase flexibility , strength, propio, ecc control.      [] Continue per plan of care [] Alter current plan (see comments)  [x] Plan of care initiated [] Hold pending MD visit [] Discharge    Electronically signed by: Lisa Gil PT    Note: If patient does not return for scheduled/recommended follow up visits, this note will serve as a discharge from care along with the most recent update on progress.

## 2022-05-23 ENCOUNTER — HOSPITAL ENCOUNTER (OUTPATIENT)
Dept: PHYSICAL THERAPY | Age: 62
Setting detail: THERAPIES SERIES
Discharge: HOME | End: 2022-05-23
Payer: COMMERCIAL

## 2022-05-23 PROCEDURE — 97140 MANUAL THERAPY 1/> REGIONS: CPT

## 2022-05-23 PROCEDURE — 97110 THERAPEUTIC EXERCISES: CPT

## 2022-05-23 NOTE — FLOWSHEET NOTE
[facility] - Outpatient Rehabilitation & Therapy  [address] [facility] [address]  Phone: [phone]   Fax:     [phone]      Physical Therapy Treatment Note/ Progress Report:     Date:  2022    Patient Name:  Moshe Alamo    :  1960  MRN: [id number]    Pertinent Medical History:Additional Pertinent Hx: cigarette smoker, htn, cerebral artery occlusion with infarction, anuerism, arthritis    Medical/Treatment Diagnosis Information:  · Diagnosis: M17.12 (ICD-10-CM) - Arthritis of left CTSDR60.413M (ICD-10-CM) - Exposed orthopaedic hardware ([address]  · Treatment Diagnosis: decreased ability to ambulate and function    Insurance/Certification information:  PT Insurance Information: [facility]  Physician Information:  Referring Provider (secondary): Dr. Tayler Dupree of care signed (Y/N): routed    Date of Patient follow up with Physician:      Progress Report: []  Yes  [x]  No     Date Range for reporting period:  Beginnin2022  Ending:      Progress report due (10 Rx/or 30 days whichever is YEQW):    Recertification due (POC duration/ or 90 days whichever is less):      Visit # POC/Insurance Allowable Auth Needed   4 96 units till 22 []Yes   []No     Latex Allergy:  [x]NO      []YES  Preferred Language for Healthcare:   [x]English       []Other:    Functional Scale:      Date assessed: at eval  Test: FOTO-67  Score:    Pain level:  6/10     History of Injury: Patient is a 64 y/o male with a hx of left knee pain since he was 14years old and had a knee surgery/.  He has gotten progressively worse over the years. He has 2 pins in it and one is coming out. He says it is not too weak and does not give out but he is in constant pain. 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 MD wants to do a total knee if he can quit smoking.                SUBJECTIVE: Pt states, \" I'm hoping to put off the total knee for a while \"  05/16/22: Patient reports knee is feels stiff after work. 5/18/22  Pt states, \" about the same, swelling isnt as bad \"  05/23/22 Patient reports knee is feeling a little looser,soreness depends how busy the days is at work. OBJECTIVE:   Initial- flex-105, ext--10, strength-4-/5    RESTRICTIONS/PRECAUTIONS:     Exercises/Interventions:     Therapeutic Ex (84555)   Min: Reps/Resistance Notes/CUES   Bike seat 11  L 5 x 5 min     leg press 75 # 2 x 20    slr X 30    Knee ext     Prone le ext X 20    Prone curls 3# x 30 Reduce weight on 05/23   Sl circles 20 x each    incline 3 x 30 sec                                        Manual Intervention (22206)  Min:15 Deep mfr to quads, hams, itb, tfl, gastroc,used massage roller add, patellar mobs gr 4,  Prom, iastm and medi cups x 4 min Try iastm, massage gun, medi cups   Knee mobs/PROM     Tib/Fem Mobs     Patella Mobs     Ankle mobs               NMR re-education (19610)  Min:  CUES NEEDED   Semi squats     Side steps     Step tap/downs     bosu ski     sls                    Therapeutic Activity (23621)  Min: Discussed mechanism of injury, anatomy, physiology, biomechanics, sleeping positions,  and strategies to accelerate the healing process. Answered all of patients questions regarding injury. Gave necessary information to get any equipment needed. Modalities  Min:     IFC with      CP after exercises     MH after exercises            Other Therapeutic Activities: Pt was educated on PT POC, Diagnosis, Prognosis, pathomechanics as well as frequency and duration of scheduling future physical therapy appointments. Time was also taken on this day to answer all patient questions and participation in PT. Reviewed appointment policy in detail with patient and patient verbalized understanding.      Home Exercise Program: Patient was instructed in the following for HEP:     . Patient verbalized/demonstrated understanding and was issued written handout. Access Code: [email or web address]  URL: [email or web address]  Date: 05/12/2022  Prepared by: Lottie Lindsay    Exercises  Prone Quadriceps Stretch with Strap - 1 x daily - 7 x weekly - 3 sets - 10 reps  Standing Hamstring Stretch on Chair - 1 x daily - 7 x weekly - 3 sets - 10 reps  Gastroc Stretch on Step - 1 x daily - 7 x weekly - 3 sets - 10 reps  Prone Hip Extension - 1 x daily - 7 x weekly - 3 sets - 10 reps  Active Straight Leg Raise with Quad Set - 1 x daily - 7 x weekly - 3 sets - 10 reps      Therapeutic Exercise and NMR EXR  [] (32830) Provided verbal/tactile cueing for activities related to strengthening, flexibility, endurance, ROM for improvements in LE, proximal hip, and core control with self care, mobility, lifting, ambulation.  [] (65436) Provided verbal/tactile cueing for activities related to improving balance, coordination, kinesthetic sense, posture, motor skill, proprioception  to assist with LE, proximal hip, and core control in self care, mobility, lifting, ambulation and eccentric single leg control.      NMR and Therapeutic Activities:    [] (81062 or 37858) Provided verbal/tactile cueing for activities related to improving balance, coordination, kinesthetic sense, posture, motor skill, proprioception and motor activation to allow for proper function of core, proximal hip and LE with self care and ADLs and functional mobility.   [] (62165) Gait Re-education- Provided training and instruction to the patient for proper LE, core and proximal hip recruitment and positioning and eccentric body weight control with ambulation re-education including up and down stairs     Home Exercise Program:    [x] (00241) Reviewed/Progressed HEP activities related to strengthening, flexibility, endurance, ROM of core, proximal hip and LE for functional self-care, mobility, lifting and ambulation/stair navigation   [] (96720)Reviewed/Progressed HEP activities related to improving balance, coordination, kinesthetic sense, posture, motor skill, proprioception of core, proximal hip and LE for self care, mobility, lifting, and ambulation/stair navigation      Manual Treatments:  PROM / STM / Oscillations-Mobs:  G-I, II, III, IV (PA's, Inf., Post.)  [] (59381) Provided manual therapy to mobilize LE, proximal hip and/or LS spine soft tissue/joints for the purpose of modulating pain, promoting relaxation,  increasing ROM, reducing/eliminating soft tissue swelling/inflammation/restriction, improving soft tissue extensibility and allowing for proper ROM for normal function with self care, mobility, lifting and ambulation. If BWC Please Indicate Time In/Out  CPT Code Time in Time out                         Approval Dates:  CPT Code Units Approved Units Used  Date Updated:     96 12               Charges:  Timed Code Treatment Minutes: 45   Total Treatment Minutes: 45      [] EVAL (LOW) 90652 (typically 20 minutes face-to-face)  [] EVAL (MOD) 94035 (typically 30 minutes face-to-face)  [] EVAL (HIGH) 31404 (typically 45 minutes face-to-face)  [] RE-EVAL     [x] EX(89719) x  2  [] Dry needle 1 or 2 Muscles (55861)  [] NMR (10639) x     [] Dry needle 3+ Muscles (47603)  [x] Manual (98716) x1     [] Ultrasound (96759) x  [] TA (33499) x     [] Mech Traction (75848)  [] ES(attended) (14662)     [] ES (un) (84226):   [] Vasopump (79493) [] Ionto (40313)   [] Other:    GOALS:  Patient stated goal: \" I want to walk and function better \"  []? Progressing: []? Met: []? Not Met: []? Adjusted     Therapist goals for Patient:   Short Term Goals: To be achieved in: 2 weeks  1. Independent in HEP and progression per patient tolerance, in order to prevent re-injury. []? Progressing: []? Met: []? Not Met: []? Adjusted  2. Patient will have a decrease in pain to facilitate improvement in movement, function, and ADLs as indicated by Functional Deficits. []? Progressing: []? Met: []? Not Met: []?  Adjusted     Long Term Goals: To be achieved in: 8 weeks  1. Increase FOTO functional outcome score from 67 to 75  to assist with reaching prior level of function. []? Progressing: []? Met: []? Not Met: []? Adjusted  2. Patient will demonstrate increased AROM to wfl to allow for proper joint functioning as indicated by patients Functional Deficits. []? Progressing: []? Met: []? Not Met: []? Adjusted  3. Patient will demonstrate an increase in Strength to good proximal hip strength and control, within 5lb HHD in LE to allow for proper functional mobility as indicated by patients Functional Deficits. []? Progressing: []? Met: []? Not Met: []? Adjusted  4. Patient will return to normal function functional activities without increased symptoms or restriction. []? Progressing: []? Met: []? Not Met: []? Adjusted  5. (patient specific functional goal)    []? Progressing: []? Met: []? Not Met: []? Adjusted            ASSESSMENT:  See eval    Treatment/Activity Tolerance:  [x] Patient tolerated treatment well [] Patient limited by fatique  [] Patient limited by pain  [] Patient limited by other medical complications  [] Other:     Overall Progression Towards Functional goals/ Treatment Progress Update:  [] Patient is progressing as expected towards functional goals listed. [] Progression is slowed due to complexities/Impairments listed. [] Progression has been slowed due to co-morbidities. [x] Plan just implemented, too soon to assess goals progression <30days   [] Goals require adjustment due to lack of progress  [] Patient is not progressing as expected and requires additional follow up with physician  [] Other    Prognosis for POC: [x] Good [] Fair  [] Poor    Patient requires continued skilled intervention: [x] Yes  [] No        PLAN: LE arom, prom  strength, proprioception, gait, balance, functional activities. Mfr, joint mobs, mods as needed, hep. Progress as tolerated, Very tight and tender in le muscles.  Loosen muscles , increase flexibility , strength, propio, ecc control. [] Continue per plan of care [] Alter current plan (see comments)  [x] Plan of care initiated [] Hold pending MD visit [] Discharge    Electronically signed by: Vianey Bush PTA    Note: If patient does not return for scheduled/recommended follow up visits, this note will serve as a discharge from care along with the most recent update on progress.

## 2022-05-25 ENCOUNTER — HOSPITAL ENCOUNTER (OUTPATIENT)
Dept: PHYSICAL THERAPY | Age: 62
Setting detail: THERAPIES SERIES
Discharge: HOME | End: 2022-05-25
Payer: COMMERCIAL

## 2022-05-25 PROCEDURE — 97140 MANUAL THERAPY 1/> REGIONS: CPT

## 2022-05-25 PROCEDURE — 97110 THERAPEUTIC EXERCISES: CPT

## 2022-05-25 NOTE — FLOWSHEET NOTE
[facility] - Outpatient Rehabilitation & Therapy  [address] [facility]. [place]  Phone: [phone]   Fax:     [phone]      Physical Therapy Treatment Note/ Progress Report:     Date:  2022    Patient Name:  Cait Chris    :  1960  MRN: [id number]    Pertinent Medical History:Additional Pertinent Hx: cigarette smoker, htn, cerebral artery occlusion with infarction, anuerism, arthritis    Medical/Treatment Diagnosis Information:  · Diagnosis: M17.12 (ICD-10-CM) - Arthritis of left [id number] (ICD-10-CM) - Exposed orthopaedic hardware ([address]  · Treatment Diagnosis: decreased ability to ambulate and function    Insurance/Certification information:  PT Insurance Information: [facility]  Physician Information:  Referring Provider (secondary): Dr. Denise Klinefelter of care signed (Y/N): routed    Date of Patient follow up with Physician:      Progress Report: []  Yes  [x]  No     Date Range for reporting period:  Beginnin2022  Ending:      Progress report due (10 Rx/or 30 days whichever is ESZY):    Recertification due (POC duration/ or 90 days whichever is less):      Visit # POC/Insurance Allowable Auth Needed   5 96 units till 22 []Yes   []No     Latex Allergy:  [x]NO      []YES  Preferred Language for Healthcare:   [x]English       []Other:    Functional Scale:      Date assessed: at eval  Test: FOTO-67  Score:    Pain level:  6/10     History of Injury: Patient is a 62 y/o male with a hx of left knee pain since he was 14years old and had a knee surgery/.  He has gotten progressively worse over the years. He has 2 pins in it and one is coming out. He says it is not too weak and does not give out but he is in constant pain. 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 MD wants to do a total knee if he can quit smoking.                SUBJECTIVE: Pt states, \" I'm hoping to put off the total knee for a while \"  05/16/22: Patient reports knee is feels stiff after work. 5/18/22  Pt states, \" about the same, swelling isnt as bad \"  05/23/22 Patient reports knee is feeling a little looser,soreness depends how busy the days is at work. 5/25/22  Pt states, \" Seems to be doing a little better overall \"    OBJECTIVE:   Initial- flex-105, ext--10, strength-4-/5012    RESTRICTIONS/PRECAUTIONS:     Exercises/Interventions:     Therapeutic Ex (42947)   Min: Reps/Resistance Notes/CUES   Bike seat 11  L 5 x 5 min     leg press 85 # 2 x 20    slr X 30    Knee ext     Prone le ext X 30    Prone curls 4# x 30 Reduce weight on 05/23   Sl circles 20 x each    incline 3 x 30 sec                                        Manual Intervention (32520)  Min:15 Deep mfr to quads, hams, itb, tfl, gastroc,used massage roller add, patellar mobs gr 4,  Prom, iastm and medi cups x 4 min Try iastm, massage gun, medi cups   Knee mobs/PROM     Tib/Fem Mobs     Patella Mobs     Ankle mobs               NMR re-education (87313)  Min:  CUES NEEDED   Semi squats     Side steps Green x 2 min    Step tap/downs     bosu ski     sls     Standing abd Green x2 min              Therapeutic Activity (45831)  Min: Discussed mechanism of injury, anatomy, physiology, biomechanics, sleeping positions,  and strategies to accelerate the healing process. Answered all of patients questions regarding injury. Gave necessary information to get any equipment needed. Modalities  Min:     IFC with      CP after exercises     MH after exercises            Other Therapeutic Activities: Pt was educated on PT POC, Diagnosis, Prognosis, pathomechanics as well as frequency and duration of scheduling future physical therapy appointments. Time was also taken on this day to answer all patient questions and participation in PT. Reviewed appointment policy in detail with patient and patient verbalized understanding.      Home Exercise Program: Patient was instructed in the following for HEP:     . Patient verbalized/demonstrated understanding and was issued written handout. Access Code: [email or web address]  URL: [email or web address]  Date: 05/12/2022  Prepared by: Aida Pedraza    Exercises  Prone Quadriceps Stretch with Strap - 1 x daily - 7 x weekly - 3 sets - 10 reps  Standing Hamstring Stretch on Chair - 1 x daily - 7 x weekly - 3 sets - 10 reps  Gastroc Stretch on Step - 1 x daily - 7 x weekly - 3 sets - 10 reps  Prone Hip Extension - 1 x daily - 7 x weekly - 3 sets - 10 reps  Active Straight Leg Raise with Quad Set - 1 x daily - 7 x weekly - 3 sets - 10 reps      Therapeutic Exercise and NMR EXR  [] (92915) Provided verbal/tactile cueing for activities related to strengthening, flexibility, endurance, ROM for improvements in LE, proximal hip, and core control with self care, mobility, lifting, ambulation.  [] (63862) Provided verbal/tactile cueing for activities related to improving balance, coordination, kinesthetic sense, posture, motor skill, proprioception  to assist with LE, proximal hip, and core control in self care, mobility, lifting, ambulation and eccentric single leg control.      NMR and Therapeutic Activities:    [] (40765 or 69223) Provided verbal/tactile cueing for activities related to improving balance, coordination, kinesthetic sense, posture, motor skill, proprioception and motor activation to allow for proper function of core, proximal hip and LE with self care and ADLs and functional mobility.   [] (31771) Gait Re-education- Provided training and instruction to the patient for proper LE, core and proximal hip recruitment and positioning and eccentric body weight control with ambulation re-education including up and down stairs     Home Exercise Program:    [x] (02259) Reviewed/Progressed HEP activities related to strengthening, flexibility, endurance, ROM of core, proximal hip and LE for functional self-care, mobility, lifting and ambulation/stair navigation   [] (81170)Reviewed/Progressed HEP activities related to improving balance, coordination, kinesthetic sense, posture, motor skill, proprioception of core, proximal hip and LE for self care, mobility, lifting, and ambulation/stair navigation      Manual Treatments:  PROM / STM / Oscillations-Mobs:  G-I, II, III, IV (PA's, Inf., Post.)  [] (37999) Provided manual therapy to mobilize LE, proximal hip and/or LS spine soft tissue/joints for the purpose of modulating pain, promoting relaxation,  increasing ROM, reducing/eliminating soft tissue swelling/inflammation/restriction, improving soft tissue extensibility and allowing for proper ROM for normal function with self care, mobility, lifting and ambulation. If [facility] Please Indicate Time In/Out  CPT Code Time in Time out                         Approval Dates:  CPT Code Units Approved Units Used  Date Updated:     96 12               Charges:  Timed Code Treatment Minutes: 45   Total Treatment Minutes: 45      [] EVAL (LOW) 47885 (typically 20 minutes face-to-face)  [] EVAL (MOD) 73724 (typically 30 minutes face-to-face)  [] EVAL (HIGH) 92753 (typically 45 minutes face-to-face)  [] RE-EVAL     [x] CA(08969) x  2  [] Dry needle 1 or 2 Muscles (32420)  [] NMR (75928) x     [] Dry needle 3+ Muscles (28791)  [x] Manual (88216) x1     [] Ultrasound (06488) x  [] TA (26543) x     [] Mech Traction (89761)  [] ES(attended) (35633)     [] ES (un) (22000):   [] Vasopump (17856) [] Ionto (20744)   [] Other:    GOALS:  Patient stated goal: \" I want to walk and function better \"  []? Progressing: []? Met: []? Not Met: []? Adjusted     Therapist goals for Patient:   Short Term Goals: To be achieved in: 2 weeks  1. Independent in HEP and progression per patient tolerance, in order to prevent re-injury. []? Progressing: []? Met: []? Not Met: []? Adjusted  2.  Patient will have a decrease in pain to facilitate improvement in movement, function, and ADLs as indicated by Functional Deficits. []? Progressing: []? Met: []? Not Met: []? Adjusted     Long Term Goals: To be achieved in: 8 weeks  1. Increase FOTO functional outcome score from 67 to 75  to assist with reaching prior level of function. []? Progressing: []? Met: []? Not Met: []? Adjusted  2. Patient will demonstrate increased AROM to wfl to allow for proper joint functioning as indicated by patients Functional Deficits. []? Progressing: []? Met: []? Not Met: []? Adjusted  3. Patient will demonstrate an increase in Strength to good proximal hip strength and control, within 5lb HHD in LE to allow for proper functional mobility as indicated by patients Functional Deficits. []? Progressing: []? Met: []? Not Met: []? Adjusted  4. Patient will return to normal function functional activities without increased symptoms or restriction. []? Progressing: []? Met: []? Not Met: []? Adjusted  5. (patient specific functional goal)    []? Progressing: []? Met: []? Not Met: []? Adjusted            ASSESSMENT:  See eval    Treatment/Activity Tolerance:  [x] Patient tolerated treatment well [] Patient limited by fatique  [] Patient limited by pain  [] Patient limited by other medical complications  [] Other:     Overall Progression Towards Functional goals/ Treatment Progress Update:  [] Patient is progressing as expected towards functional goals listed. [] Progression is slowed due to complexities/Impairments listed. [] Progression has been slowed due to co-morbidities. [x] Plan just implemented, too soon to assess goals progression <30days   [] Goals require adjustment due to lack of progress  [] Patient is not progressing as expected and requires additional follow up with physician  [] Other    Prognosis for POC: [x] Good [] Fair  [] Poor    Patient requires continued skilled intervention: [x] Yes  [] No        PLAN: LE arom, prom  strength, proprioception, gait, balance, functional activities.   Mfr, joint mobs, mods as needed, hep. Progress as tolerated, Very tight and tender in le muscles. Loosen muscles , increase flexibility , strength, propio, ecc control. [] Continue per plan of care [] Alter current plan (see comments)  [x] Plan of care initiated [] Hold pending MD visit [] Discharge    Electronically signed by: Damari Billingsley, PT    Note: If patient does not return for scheduled/recommended follow up visits, this note will serve as a discharge from care along with the most recent update on progress.

## 2022-06-04 DIAGNOSIS — I10 PRIMARY HYPERTENSION: ICD-10-CM

## 2022-06-04 DIAGNOSIS — M25.562 PAIN AND SWELLING OF KNEE, LEFT: ICD-10-CM

## 2022-06-04 DIAGNOSIS — M25.462 PAIN AND SWELLING OF KNEE, LEFT: ICD-10-CM

## 2022-06-06 ENCOUNTER — OFFICE VISIT (OUTPATIENT)
Dept: INTERNAL MEDICINE CLINIC | Age: 62
End: 2022-06-06
Payer: COMMERCIAL

## 2022-06-06 VITALS
HEIGHT: 65 IN | TEMPERATURE: 97.4 F | OXYGEN SATURATION: 98 % | DIASTOLIC BLOOD PRESSURE: 78 MMHG | BODY MASS INDEX: 26.19 KG/M2 | HEART RATE: 65 BPM | WEIGHT: 157.2 LBS | SYSTOLIC BLOOD PRESSURE: 130 MMHG

## 2022-06-06 DIAGNOSIS — N52.8 OTHER MALE ERECTILE DYSFUNCTION: ICD-10-CM

## 2022-06-06 DIAGNOSIS — F17.210 SMOKES CIGARETTES: ICD-10-CM

## 2022-06-06 DIAGNOSIS — I10 PRIMARY HYPERTENSION: Primary | ICD-10-CM

## 2022-06-06 DIAGNOSIS — E55.9 VITAMIN D DEFICIENCY: ICD-10-CM

## 2022-06-06 DIAGNOSIS — M25.562 PAIN AND SWELLING OF KNEE, LEFT: ICD-10-CM

## 2022-06-06 DIAGNOSIS — Z12.5 SCREENING FOR PROSTATE CANCER: ICD-10-CM

## 2022-06-06 DIAGNOSIS — M25.462 PAIN AND SWELLING OF KNEE, LEFT: ICD-10-CM

## 2022-06-06 DIAGNOSIS — Z12.11 SCREEN FOR COLON CANCER: ICD-10-CM

## 2022-06-06 DIAGNOSIS — J43.8 OTHER EMPHYSEMA (HCC): ICD-10-CM

## 2022-06-06 PROCEDURE — G8427 DOCREV CUR MEDS BY ELIG CLIN: HCPCS | Performed by: INTERNAL MEDICINE

## 2022-06-06 PROCEDURE — 3017F COLORECTAL CA SCREEN DOC REV: CPT | Performed by: INTERNAL MEDICINE

## 2022-06-06 PROCEDURE — 99214 OFFICE O/P EST MOD 30 MIN: CPT | Performed by: INTERNAL MEDICINE

## 2022-06-06 PROCEDURE — G8419 CALC BMI OUT NRM PARAM NOF/U: HCPCS | Performed by: INTERNAL MEDICINE

## 2022-06-06 PROCEDURE — 99406 BEHAV CHNG SMOKING 3-10 MIN: CPT | Performed by: INTERNAL MEDICINE

## 2022-06-06 PROCEDURE — 4004F PT TOBACCO SCREEN RCVD TLK: CPT | Performed by: INTERNAL MEDICINE

## 2022-06-06 PROCEDURE — 3023F SPIROM DOC REV: CPT | Performed by: INTERNAL MEDICINE

## 2022-06-06 RX ORDER — AMLODIPINE BESYLATE 10 MG/1
TABLET ORAL
Qty: 90 TABLET | Refills: 0 | Status: SHIPPED | OUTPATIENT
Start: 2022-06-06 | End: 2022-08-04 | Stop reason: SDUPTHER

## 2022-06-06 RX ORDER — LABETALOL 300 MG/1
300 TABLET, FILM COATED ORAL DAILY
Qty: 90 TABLET | Refills: 0 | Status: SHIPPED | OUTPATIENT
Start: 2022-06-06 | End: 2022-08-04 | Stop reason: SDUPTHER

## 2022-06-06 RX ORDER — CALCIUM CARBONATE/VITAMIN D3 600 MG-20
TABLET ORAL
Qty: 90 CAPSULE | Refills: 0 | Status: SHIPPED | OUTPATIENT
Start: 2022-06-06 | End: 2022-08-05

## 2022-06-06 RX ORDER — TADALAFIL 5 MG/1
5 TABLET ORAL DAILY PRN
Qty: 30 TABLET | Refills: 2 | Status: SHIPPED | OUTPATIENT
Start: 2022-06-06 | End: 2022-09-29 | Stop reason: SDUPTHER

## 2022-06-06 RX ORDER — HYDROCHLOROTHIAZIDE 25 MG/1
25 TABLET ORAL EVERY MORNING
Qty: 90 TABLET | Refills: 0 | Status: SHIPPED | OUTPATIENT
Start: 2022-06-06 | End: 2022-08-04 | Stop reason: SDUPTHER

## 2022-06-06 RX ORDER — LABETALOL 300 MG/1
TABLET, FILM COATED ORAL
Qty: 90 TABLET | Refills: 0 | OUTPATIENT
Start: 2022-06-06

## 2022-06-06 RX ORDER — DICLOFENAC SODIUM 75 MG/1
TABLET, DELAYED RELEASE ORAL
Qty: 60 TABLET | Refills: 0 | Status: SHIPPED | OUTPATIENT
Start: 2022-06-06 | End: 2022-07-07

## 2022-06-06 RX ORDER — TIOTROPIUM BROMIDE 18 UG/1
18 CAPSULE ORAL; RESPIRATORY (INHALATION) DAILY
Qty: 90 CAPSULE | Refills: 0 | Status: SHIPPED | OUTPATIENT
Start: 2022-06-06 | End: 2022-08-04 | Stop reason: SDUPTHER

## 2022-06-06 RX ORDER — AMLODIPINE BESYLATE 10 MG/1
TABLET ORAL
Qty: 90 TABLET | Refills: 0 | OUTPATIENT
Start: 2022-06-06

## 2022-06-06 SDOH — ECONOMIC STABILITY: FOOD INSECURITY: WITHIN THE PAST 12 MONTHS, YOU WORRIED THAT YOUR FOOD WOULD RUN OUT BEFORE YOU GOT MONEY TO BUY MORE.: NEVER TRUE

## 2022-06-06 SDOH — ECONOMIC STABILITY: FOOD INSECURITY: WITHIN THE PAST 12 MONTHS, THE FOOD YOU BOUGHT JUST DIDN'T LAST AND YOU DIDN'T HAVE MONEY TO GET MORE.: NEVER TRUE

## 2022-06-06 ASSESSMENT — PATIENT HEALTH QUESTIONNAIRE - PHQ9
1. LITTLE INTEREST OR PLEASURE IN DOING THINGS: 0
SUM OF ALL RESPONSES TO PHQ9 QUESTIONS 1 & 2: 0
SUM OF ALL RESPONSES TO PHQ QUESTIONS 1-9: 0
SUM OF ALL RESPONSES TO PHQ QUESTIONS 1-9: 0
2. FEELING DOWN, DEPRESSED OR HOPELESS: 0
SUM OF ALL RESPONSES TO PHQ QUESTIONS 1-9: 0
SUM OF ALL RESPONSES TO PHQ QUESTIONS 1-9: 0

## 2022-06-06 ASSESSMENT — ANXIETY QUESTIONNAIRES
5. BEING SO RESTLESS THAT IT IS HARD TO SIT STILL: 0
1. FEELING NERVOUS, ANXIOUS, OR ON EDGE: 0
4. TROUBLE RELAXING: 0
6. BECOMING EASILY ANNOYED OR IRRITABLE: 0
7. FEELING AFRAID AS IF SOMETHING AWFUL MIGHT HAPPEN: 0
3. WORRYING TOO MUCH ABOUT DIFFERENT THINGS: 0
2. NOT BEING ABLE TO STOP OR CONTROL WORRYING: 0
GAD7 TOTAL SCORE: 0
IF YOU CHECKED OFF ANY PROBLEMS ON THIS QUESTIONNAIRE, HOW DIFFICULT HAVE THESE PROBLEMS MADE IT FOR YOU TO DO YOUR WORK, TAKE CARE OF THINGS AT HOME, OR GET ALONG WITH OTHER PEOPLE: NOT DIFFICULT AT ALL

## 2022-06-06 ASSESSMENT — SOCIAL DETERMINANTS OF HEALTH (SDOH): HOW HARD IS IT FOR YOU TO PAY FOR THE VERY BASICS LIKE FOOD, HOUSING, MEDICAL CARE, AND HEATING?: NOT HARD AT ALL

## 2022-06-06 NOTE — PATIENT INSTRUCTIONS
TAKE MED AS ADVISED    DIET/ EXERCISE. FOLLOW UP WITHIN 3 MONTHS / AS NEEDED    FOLLOW UP FOR FASTING [facility] [address] [place] [address] Laboratory Locations - No appointment necessary. @ indicates the location is open Saturdays in addition to Monday through Friday. Call your preferred location for test preparation, business hours and other information you need. [employer] accepts [employer]. [facility]    @ [place] [facility] [address] [place], [address]   Ph: [phone] [facility] Sv[facility]. [address]   Ph: [phone]  @ [place] [facility] [address] [facility]   Ph: [phone]    [facility] [address]   Ph: [phone] @ [place] [facility] [address]  Ph: [phone] @ [facility] Svcs. [address] [place] [address]   Ph: [phone]    [place]   @ [place] [facility] [address] [facility] [place], [place] [address]   Ph: [phone] [facility] [address] [facility], [address] [facility]  Ph: [phone] [place] [phone]   [facility] [address]:  [address] [facility] Sv[facility] [facility]   Ph: [phone] [facility]. Lab Svcs.   [address] [facility], [facility]   Ph: [phone]

## 2022-06-06 NOTE — PROGRESS NOTES
[facility]    If you have any questions regarding to your visit please contact your care team:       Team Red:   Clinic Hours Telephone Number   Dr. Monica Harper, NP   7am-7pm  Monday - Thursday   7am-5pm  Fridays  [phone]  (Appointment scheduling available 24/7)    Questions about your visit?   Team Line  [phone]   [facility] [place] [facility] - 11am-9pm Monday-Friday Saturday-Sunday- 9am-5pm   [place] - 5pm-9pm Monday-Friday Saturday-Sunday- 9am-5pm  [phone] - [place]   [phone] - [place]       What options do I have for visits at the clinic other than the traditional office visit?  To expand how we care for you, many of our providers are utilizing electronic visits (e-visits) and telephone visits, when medically appropriate, for interactions with their patients rather than a visit in the clinic.   We also offer nurse visits for many medical concerns. Just like any other service, we will bill your insurance company for this type of visit based on time spent on the phone with your provider. Not all insurance companies cover these visits. Please check with your medical insurance if this type of visit is covered. You will be responsible for any charges that are not paid by your insurance.      E-visits via [email or web address]:  generally incur a $35.00 fee.  Telephone visits:  Time spent on the phone: *charged based on time that is spent on the phone in increments of 10 minutes. Estimated cost:   5-10 mins $30.00   11-20 mins. $59.00   21-30 mins. $85.00     Use Jumbletst (secure email communication and access to your chart) to send your primary care provider a message or make an appointment. Ask someone on your Team how to sign up for [email or web address].  For a Price Quote for your services, please call our Consumer Price Line at [phone].      As always, Thank you for trusting us with your health care needs!    Discharged  SUBJECTIVE:  Keke Porras is a 63 y.o. male HERE FOR   Chief Complaint   Patient presents with    Hypertension    Other        PT HERE FOR EVAL     HTN - TAKING MEDS .  BP ELEVATED. ? DIET / EXERCISE COMPLIANCE. NO HEADACHE,  [address]. SHARP PAIN, SWELLING - IMPROVED. NO RAD, PAIN SCALE 7 /10, DENIES TRAUMA. FOLLOWING WITH ORTHO. RECENT PHYSICAL THERAPY HELPED  EMPHYSEMA -  DENIES SOB, NO  WHEEZING. Renetta Foreman. NO INCREASED INHALER USE   VIT D DEF -  TAKING MED. IMPROVING -  LAB D/W DPT  ED - ON MED - HELPING  + SMOKER - CESSATION READDRESSED  SCREENING PROSTATE CA D/W DPT  SCREENING COLON CA D/W DPT     DENIES CP, NO SOB , No PALPITATIONS, NO COUGH, NO F/C  No ABD PAIN, No N/V, No DIARRHEA, No CONSTIPATION, No MELENA, No HEMATOCHEZIA. No DYSURIA, No FREQ, No URGENCY, No HEMATURIA      PMH: REVIEWED AND UPDATED TODAY    PSH: REVIEWED AND UPDATED TODAY    SOCIAL HX: REVIEWED AND UPDATED TODAY    FAMILY HX: REVIEWED AND UPDATED TODAY    ALLERGY:  Pcn [penicillins]    MEDS: REVIEWED  Prior to Visit Medications    Medication Sig Taking?  Authorizing Provider   diclofenac (VOLTAREN) 75 MG EC tablet TAKE 1 TABLET BY MOUTH TWICE A DAY AS NEEDED FOR PAIN  Aleida Slade MD   [employer] D3 50 MCG (2000 UT) CAPS TAKE 1 CAPSULE BY MOUTH EVERY DAY  Aleida Slade MD   amLODIPine ([address]) 10 MG tablet TAKE 1 TABLET BY MOUTH EVERY DAY  Aleida Slade MD   hydroCHLOROthiazide (HYDRODIURIL) 25 MG tablet Take 1 tablet by mouth every morning  Aleida Slade MD   labetalol (NORMODYNE) 300 MG tablet Take 1 tablet by mouth daily  Aleida Slade MD   tiotropium (Tanda Romain) 18 MCG inhalation capsule Inhale 1 capsule into the lungs daily  Aleida Slade MD   [facility]S ASPIRIN ADULT LOW DOSE 81 MG chewable tablet TAKE 1 TABLET BY MOUTH EVERY DAY  Aleida Slade MD   tadalafil (CIALIS) 5 MG tablet Take 1 tablet by mouth daily as needed for Erectile Dysfunction Take one tablet daily as by Teresa Arnold MA.     needed for erectile dysfunction. Ariel Luna MD   albuterol sulfate HFA (VENTOLIN HFA) 108 (90 Base) MCG/ACT inhaler Inhale 2 puffs into the lungs every 6 hours as needed for Wheezing or Shortness of Breath  Ariel Luna MD   meloxicam (MOBIC) 7.5 MG tablet Take 1 tablet by mouth daily as needed for Pain  Ariel Luna MD   varenicline (CHANTIX CONTINUING MONTH ROSARIO) 1 MG tablet Take 1 tablet by mouth 2 times daily  Ali Romp, APRN   acetaminophen (TYLENOL) 325 MG tablet Take 650 mg by mouth as needed  Historical Provider, MD       ROS: COMPREHENSIVE ROS AS IN HX, REST -VE  History obtained from chart review and the patient       OBJECTIVE:   NURSING NOTE AND VITALS REVIEWED  BP (!) 152/86 (Site: Right Upper Arm, Position: Sitting, Cuff Size: Small Adult)   Pulse 65   Temp 97.4 °F (36.3 °C)   Ht 5' 5\" (1.651 m)   Wt 157 lb 3.2 oz (71.3 kg)   SpO2 98%   BMI 26.16 kg/m²     NO ACUTE DISTRESS  + NICOTINE BREATH    REPEAT BP: 130/78 (RT), NO ORTHOSTASIS     Body mass index is 26.16 kg/m². HEENT: NO PALLOR, ANICTERIC, PERRLA, EOMI, NO CONJUNCTIVAL ERYTHEMA,                 NO SINUS TENDERNESS  NECK:  SUPPLE, TRACHEA MIDLINE, NT, NO JVD, NO CB, NO LA, NO TM, NO STIFFNESS  CHEST: RESPY EFFORT NL, GOOD AE, NO W/R/C  HEART: S1S2+ REG, NO M/G/R  ABD: SOFT, NT, NO HSM, BS+  EXT: NO EDEMA, NT, PULSES +. SARA'S -VE  NEURO: ALERT AND ORIENTED X 3, NO MENINGEAL SIGNS, NO TREMORS, NL GAIT, NO FOCAL DEFICITS  PSYCH: FAIRLY GOOD AFFECT  BACK: NT, NO ROM, NO CVA TENDERNESS  KNEE: NT, OCC PAIN WITH MVT, NO ROM       PREVIOUS LABS REVIEWED AND D/W PT    ASSESSMENT / PLAN:     Diagnosis Orders   1. Primary hypertension  COUNSELLED. REPEAT BP AS ABOVE  CONTINUE MEDS. ADVISED LOW NA+ / DASH DIET/ EXERCISE. MONITOR. GOAL </= 130/80  F/U LABS  MAKE CHANGES AS NEEDED. 2. Pain and swelling of knee, left  COUNSELLED. IMPROVING. EXERCISES. ANALGESICS PRN. MONITOR. F/U ORTHO  MAKE CHANGES AS NEEDED.         3. Other emphysema ([facility] [address]  COUNSELLED. CONTINUE SPIRIVA ALBUTEROL PRN. MONITOR. ADVISED SMOKING CESSATION  MAKE CHANGES AS NEEDED. 4. Vitamin D deficiency  COUNSELLED. ADVISED MED COMPLIANCE - ADVISED VIT D 2000 U DAILY. MONITOR AND MAKE CHANGES AS NEEDED. 5. Other male erectile dysfunction  COUNSELLED. CIALIS 5 MG REFILLED - S/E D/W PT  MAKE CHANGES AS NEEDED. 6. Smokes cigarettes  Smoking cessation was encouraged. Cessation techniques reviewed today. COUNSELLED. CESSATION ADVISED   PA TOBACCO USE CESSATION INTERMEDIATE 3-10 MINUTES (4 MINS)  COMPLICATIONS OF TOBACCO USE INCLUDING COPD, CANCER, CAD D/W PT  PT VERBALIZED UNDERSTANDING       7. Screening for prostate cancer  COUNSELLED. PSA WITH LABS - OK WITH PT  MAKE CHANGES AS NEEDED. 8. Screen for colon cancer  COUNSELLED. REFER GI FOR C SCOPE  MAKE CHANGES AS NEEDED.                          MEDICATION SIDE EFFECTS D/W PATIENT    RETURN TO CLINIC WITHIN 3 MONTHS / PRN    FOLLOW UP FOR FASTING LABS COLONOSCOPY

## 2022-06-14 ENCOUNTER — HOSPITAL ENCOUNTER (OUTPATIENT)
Dept: PHYSICAL THERAPY | Age: 62
Setting detail: THERAPIES SERIES
Discharge: HOME | End: 2022-06-14
Payer: COMMERCIAL

## 2022-06-16 ENCOUNTER — HOSPITAL ENCOUNTER (OUTPATIENT)
Dept: PHYSICAL THERAPY | Age: 62
Setting detail: THERAPIES SERIES
Discharge: HOME | End: 2022-06-16
Payer: COMMERCIAL

## 2022-06-16 PROCEDURE — 97112 NEUROMUSCULAR REEDUCATION: CPT

## 2022-06-16 PROCEDURE — 97110 THERAPEUTIC EXERCISES: CPT

## 2022-06-16 PROCEDURE — 97140 MANUAL THERAPY 1/> REGIONS: CPT

## 2022-06-16 NOTE — FLOWSHEET NOTE
[facility] - Outpatient Rehabilitation & Therapy  [address] [facility]. [place]  Phone: [phone]   Fax:     [phone]      Physical Therapy Treatment Note/ Progress Report:     Date:  2022    Patient Name:  Ben Law    :  1960  MRN: [id number]    Pertinent Medical History:Additional Pertinent Hx: cigarette smoker, htn, cerebral artery occlusion with infarction, anuerism, arthritis    Medical/Treatment Diagnosis Information:  · Diagnosis: M17.12 (ICD-10-CM) - Arthritis of left [id number] (ICD-10-CM) - Exposed orthopaedic hardware ([address]  · Treatment Diagnosis: decreased ability to ambulate and function    Insurance/Certification information:  [facility]  Physician Information:  Referring Provider (secondary): Dr. Khushboo Dorado of care signed (Y/N): routed    Date of Patient follow up with Physician:      Progress Report: []  Yes  [x]  No     Date Range for reporting period:  Beginnin2022  Ending:      Progress report due (10 Rx/or 30 days whichever is PZVM):54    Recertification due (POC duration/ or 90 days whichever is less):      Visit # POC/Insurance Allowable Auth Needed   6 96 units till 22 []Yes   []No     Latex Allergy:  [x]NO      []YES  Preferred Language for Healthcare:   [x]English       []Other:    Functional Scale:      Date assessed: at eval  Test: FOTO-67  Score:    Pain level:  3-4/10     History of Injury: Patient is a 64 y/o male with a hx of left knee pain since he was 14years old and had a knee surgery/.  He has gotten progressively worse over the years. He has 2 pins in it and one is coming out. He says it is not too weak and does not give out but he is in constant pain. 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 MD wants to do a total knee if he can quit smoking.                SUBJECTIVE: Pt states, \" I'm hoping to put off the total knee for a while \"  05/16/22: Patient reports knee is feels stiff after work. 5/18/22  Pt states, \" about the same, swelling isnt as bad \"  05/23/22 Patient reports knee is feeling a little looser,soreness depends how busy the days is at work. 5/25/22  Pt states, \" Seems to be doing a little better overall \"  6/16/22  Pt states, \" Doing better overall \"    OBJECTIVE:   Initial- flex-105, ext--10, strength-4-/5012    RESTRICTIONS/PRECAUTIONS:     Exercises/Interventions:     Therapeutic Ex (85557)   Min: Reps/Resistance Notes/CUES   Bike seat 11  L 5 x 5 min     leg press 85 # 2 x 20    slr X 30    Knee ext     Prone le ext X 30    Prone curls 4# x 30, quad stretch 30 x 3 Reduce weight on 05/23   Sl circles 20 x each    incline 60 x 2 sec                                        Manual Intervention (24013)  Min:15 Deep mfr to quads, hams, itb, tfl, gastroc,used massage roller add, patellar mobs gr 4,  Prom, iastm and medi cups x 4 min  ( gatroc very tight ) Try iastm, massage gun, medi cups   Knee mobs/PROM     Tib/Fem Mobs     Patella Mobs     Ankle mobs               NMR re-education (70141)  Min:  CUES NEEDED   Semi squats     Side steps Green x 2 min    Step tap/downs     bosu ski     sls     Standing abd Green x2 min    wobble X 2 min side                        Therapeutic Activity (73629)  Min: Discussed mechanism of injury, anatomy, physiology, biomechanics, sleeping positions,  and strategies to accelerate the healing process. Answered all of patients questions regarding injury. Gave necessary information to get any equipment needed. Modalities  Min:     IFC with      CP after exercises     MH after exercises            Other Therapeutic Activities: Pt was educated on PT POC, Diagnosis, Prognosis, pathomechanics as well as frequency and duration of scheduling future physical therapy appointments.  Time was also taken on this day to answer all patient questions and participation in PT. Reviewed appointment policy in detail with patient and patient verbalized understanding. Home Exercise Program: Patient was instructed in the following for HEP:     . Patient verbalized/demonstrated understanding and was issued written handout. Access Code: [email or web address]  URL: [email or web address]  Date: 05/12/2022  Prepared by: Bedelia Laclede    Exercises  Prone Quadriceps Stretch with Strap - 1 x daily - 7 x weekly - 3 sets - 10 reps  Standing Hamstring Stretch on Chair - 1 x daily - 7 x weekly - 3 sets - 10 reps  Gastroc Stretch on Step - 1 x daily - 7 x weekly - 3 sets - 10 reps  Prone Hip Extension - 1 x daily - 7 x weekly - 3 sets - 10 reps  Active Straight Leg Raise with Quad Set - 1 x daily - 7 x weekly - 3 sets - 10 reps   Access Code: [email or web address]  URL: [email or web address]  Date: 06/16/2022  Prepared by: Bedelia Laclede    Exercises  Gastroc Stretch on Step - 1 x daily - 7 x weekly - 3 sets - 10 reps      Therapeutic Exercise and NMR EXR  [] (76184) Provided verbal/tactile cueing for activities related to strengthening, flexibility, endurance, ROM for improvements in LE, proximal hip, and core control with self care, mobility, lifting, ambulation.  [] (53061) Provided verbal/tactile cueing for activities related to improving balance, coordination, kinesthetic sense, posture, motor skill, proprioception  to assist with LE, proximal hip, and core control in self care, mobility, lifting, ambulation and eccentric single leg control.      NMR and Therapeutic Activities:    [] (60014 or 59697) Provided verbal/tactile cueing for activities related to improving balance, coordination, kinesthetic sense, posture, motor skill, proprioception and motor activation to allow for proper function of core, proximal hip and LE with self care and ADLs and functional mobility.   [] (16447) Gait Re-education- Provided training and instruction to the patient for proper LE, core and proximal hip recruitment and positioning and eccentric body weight control with ambulation re-education including up and down stairs     Home Exercise Program:    [x] (13504) Reviewed/Progressed HEP activities related to strengthening, flexibility, endurance, ROM of core, proximal hip and LE for functional self-care, mobility, lifting and ambulation/stair navigation   [] (07285)Reviewed/Progressed HEP activities related to improving balance, coordination, kinesthetic sense, posture, motor skill, proprioception of core, proximal hip and LE for self care, mobility, lifting, and ambulation/stair navigation      Manual Treatments:  PROM / STM / Oscillations-Mobs:  G-I, II, III, IV (PA's, Inf., Post.)  [] (94408) Provided manual therapy to mobilize LE, proximal hip and/or LS spine soft tissue/joints for the purpose of modulating pain, promoting relaxation,  increasing ROM, reducing/eliminating soft tissue swelling/inflammation/restriction, improving soft tissue extensibility and allowing for proper ROM for normal function with self care, mobility, lifting and ambulation. If [facility] Please Indicate Time In/Out  CPT Code Time in Time out                         Approval Dates:  CPT Code Units Approved Units Used  Date Updated:     96 12               Charges:  Timed Code Treatment Minutes: 45   Total Treatment Minutes: 45      [] EVAL (LOW) 80524 (typically 20 minutes face-to-face)  [] EVAL (MOD) 03759 (typically 30 minutes face-to-face)  [] EVAL (HIGH) 59866 (typically 45 minutes face-to-face)  [] RE-EVAL     [x] DK(73557) x  2  [] Dry needle 1 or 2 Muscles (13751)  [] NMR (91752) x     [] Dry needle 3+ Muscles (96771)  [x] Manual (49066) x1     [] Ultrasound (47113) x  [] TA (55177) x     [] Mech Traction (31078)  [] ES(attended) (17173)     [] ES (un) (82665):   [] Vasopump (08415) [] Ionto (90565)   [] Other:    GOALS:  Patient stated goal: \" I want to walk and function better \"  []? Progressing: []? Met: []?  Not Met: []? Adjusted     Therapist goals for Patient:   Short Term Goals: To be achieved in: 2 weeks  1. Independent in HEP and progression per patient tolerance, in order to prevent re-injury. []? Progressing: []? Met: []? Not Met: []? Adjusted  2. Patient will have a decrease in pain to facilitate improvement in movement, function, and ADLs as indicated by Functional Deficits. []? Progressing: []? Met: []? Not Met: []? Adjusted     Long Term Goals: To be achieved in: 8 weeks  1. Increase FOTO functional outcome score from 67 to 75  to assist with reaching prior level of function. []? Progressing: []? Met: []? Not Met: []? Adjusted  2. Patient will demonstrate increased AROM to wfl to allow for proper joint functioning as indicated by patients Functional Deficits. []? Progressing: []? Met: []? Not Met: []? Adjusted  3. Patient will demonstrate an increase in Strength to good proximal hip strength and control, within 5lb HHD in LE to allow for proper functional mobility as indicated by patients Functional Deficits. []? Progressing: []? Met: []? Not Met: []? Adjusted  4. Patient will return to normal function functional activities without increased symptoms or restriction. []? Progressing: []? Met: []? Not Met: []? Adjusted  5. (patient specific functional goal)    []? Progressing: []? Met: []? Not Met: []? Adjusted            ASSESSMENT:  See eval    Treatment/Activity Tolerance:  [x] Patient tolerated treatment well [] Patient limited by fatique  [] Patient limited by pain  [] Patient limited by other medical complications  [] Other:     Overall Progression Towards Functional goals/ Treatment Progress Update:  [] Patient is progressing as expected towards functional goals listed. [] Progression is slowed due to complexities/Impairments listed. [] Progression has been slowed due to co-morbidities.   [x] Plan just implemented, too soon to assess goals progression <30days   [] Goals require adjustment due to lack of progress  [] Patient is not progressing as expected and requires additional follow up with physician  [] Other    Prognosis for POC: [x] Good [] Fair  [] Poor    Patient requires continued skilled intervention: [x] Yes  [] No        PLAN: LE arom, prom  strength, proprioception, gait, balance, functional activities. Mfr, joint mobs, mods as needed, hep. Progress as tolerated, Very tight and tender in le muscles. Loosen muscles , increase flexibility , strength, propio, ecc control. 6/16/22  Pt progressing, less pain in general, walking better. Increase exs as bambi    [x] Continue per plan of care [] Alter current plan (see comments)  [] Plan of care initiated [] Hold pending MD visit [] Discharge    Electronically signed by: Chelo Moreno, PT    Note: If patient does not return for scheduled/recommended follow up visits, this note will serve as a discharge from care along with the most recent update on progress.

## 2022-06-21 ENCOUNTER — HOSPITAL ENCOUNTER (OUTPATIENT)
Dept: PHYSICAL THERAPY | Age: 62
Setting detail: THERAPIES SERIES
Discharge: HOME | End: 2022-06-21
Payer: COMMERCIAL

## 2022-06-21 NOTE — FLOWSHEET NOTE
[address] [facility] [place] [place]  Phone: [phone]   Fax:     [phone]    Physical Therapy  Cancellation/No-show Note  Patient Name:  Chuck Gordon  :  1960   Date:  2022  Cancelled visits to date: 0  No-shows to date: 2    Patient status for today's appointment patient:  []  Cancelled  []  Rescheduled appointment  [x]  No-show     Reason given by patient:  []  Patient ill  []  Conflicting appointment  []  No transportation    []  Conflict with work  [x]  No reason given  []  Other:     Comments:      Phone call information:   [x]  Phone call made today to patient at 5:30 time at number provided:      []  Patient answered, conversation as follows:    [x]  Patient did not answer, message left as follows: They were reminded of the attendance policy and might be discharged if they miss again. Reminded them of their next appointment time and date and to call if they are going to miss.   []  Phone call not made today    Electronically signed by:  Shoshana Landers PTA

## 2022-06-23 ENCOUNTER — HOSPITAL ENCOUNTER (OUTPATIENT)
Dept: PHYSICAL THERAPY | Age: 62
Setting detail: THERAPIES SERIES
Discharge: HOME | End: 2022-06-23
Payer: COMMERCIAL

## 2022-06-23 PROCEDURE — 97110 THERAPEUTIC EXERCISES: CPT

## 2022-06-23 PROCEDURE — 97140 MANUAL THERAPY 1/> REGIONS: CPT

## 2022-06-23 NOTE — FLOWSHEET NOTE
[facility] - Outpatient Rehabilitation & Therapy  [address] [facility] [place]  Phone: [phone]   Fax:     [phone]      Physical Therapy Treatment Note/ Progress Report:     Date:  2022    Patient Name:  Emma Alba    :  1960  MRN: [id number]    Pertinent Medical History:Additional Pertinent Hx: cigarette smoker, htn, cerebral artery occlusion with infarction, anuerism, arthritis    Medical/Treatment Diagnosis Information:  · Diagnosis: M17.12 (ICD-10-CM) - Arthritis of left [id number] (ICD-10-CM) - Exposed orthopaedic hardware ([address]  · Treatment Diagnosis: decreased ability to ambulate and function    Insurance/Certification information:  [place] [facility] [place]  Physician Information:  Referring Provider (secondary): Dr. Jojo Schofield of care signed (Y/N): routed    Date of Patient follow up with Physician:      Progress Report: []  Yes  [x]  No     Date Range for reporting period:  Beginnin2022  Ending:      Progress report due (10 Rx/or 30 days whichever is XOVM):94    Recertification due (POC duration/ or 90 days whichever is less):      Visit # POC/Insurance Allowable Auth Needed   7 96 units till 22 []Yes   []No     Latex Allergy:  [x]NO      []YES  Preferred Language for Healthcare:   [x]English       []Other:    Functional Scale:      Date assessed: at eval  Test: FOTO-67  Score:    Pain level:  3-4/10     History of Injury: Patient is a 62 y/o male with a hx of left knee pain since he was 14years old and had a knee surgery/.  He has gotten progressively worse over the years. He has 2 pins in it and one is coming out. He says it is not too weak and does not give out but he is in constant pain. 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 MD wants to do a total knee if he can quit smoking.                SUBJECTIVE: Pt states, \" I'm hoping to put off the total knee for a while \"  05/16/22: Patient reports knee is feels stiff after work. 5/18/22  Pt states, \" about the same, swelling isnt as bad \"  05/23/22 Patient reports knee is feeling a little looser,soreness depends how busy the days is at work. 5/25/22  Pt states, \" Seems to be doing a little better overall \"  6/16/22  Pt states, \" Doing better overall \"  6/23/22  Pt states, \" My knee flared up over the past few days \"    OBJECTIVE:   Initial- flex-105, ext--10, strength-4-/5012    RESTRICTIONS/PRECAUTIONS:     Exercises/Interventions:     Therapeutic Ex (34054)   Min: Reps/Resistance Notes/CUES   Bike seat 11  L 5 x 5 min     leg press 85 # 2 x 20    slr X 30    Knee ext     Prone le ext X 30    Prone curls  x 30, quad stretch 30 x 3 Reduce weight on 05/23   Sl circles 20 x each    incline 60 x 2 sec                                        Manual Intervention (84197)  Min:15 Deep mfr to quads, hams, itb, tfl, gastroc,used massage roller add, patellar mobs gr 4,  Prom,  Try iastm, massage gun, medi cups   Knee mobs/PROM     Tib/Fem Mobs     Patella Mobs     Ankle mobs               NMR re-education (48409)  Min:  CUES NEEDED   Semi squats     Side steps    Step tap/downs    bosu ski    sls    Standing abd    wobble                        Therapeutic Activity (70049)  Min: Discussed mechanism of injury, anatomy, physiology, biomechanics, sleeping positions,  and strategies to accelerate the healing process. Answered all of patients questions regarding injury. Gave necessary information to get any equipment needed. Modalities  Min: Game ready x 15 min    IFC with      CP after exercises     MH after exercises            Other Therapeutic Activities: Pt was educated on PT POC, Diagnosis, Prognosis, pathomechanics as well as frequency and duration of scheduling future physical therapy appointments.  Time was also taken on this day to answer all patient questions and participation in PT. Reviewed appointment policy in detail with patient and patient verbalized understanding. Home Exercise Program: Patient was instructed in the following for HEP:     . Patient verbalized/demonstrated understanding and was issued written handout. Access Code: [email or web address]  URL: [employer]/  Date: 05/12/2022  Prepared by: Crescencio Garzon    Exercises  Prone Quadriceps Stretch with Strap - 1 x daily - 7 x weekly - 3 sets - 10 reps  Standing Hamstring Stretch on Chair - 1 x daily - 7 x weekly - 3 sets - 10 reps  Gastroc Stretch on Step - 1 x daily - 7 x weekly - 3 sets - 10 reps  Prone Hip Extension - 1 x daily - 7 x weekly - 3 sets - 10 reps  Active Straight Leg Raise with Quad Set - 1 x daily - 7 x weekly - 3 sets - 10 reps   Access Code: [email or web address]  URL: [employer]/  Date: 06/16/2022  Prepared by: Crescencio Garzon    Exercises  Gastroc Stretch on Step - 1 x daily - 7 x weekly - 3 sets - 10 reps      Therapeutic Exercise and NMR EXR  [] (15714) Provided verbal/tactile cueing for activities related to strengthening, flexibility, endurance, ROM for improvements in LE, proximal hip, and core control with self care, mobility, lifting, ambulation.  [] (11798) Provided verbal/tactile cueing for activities related to improving balance, coordination, kinesthetic sense, posture, motor skill, proprioception  to assist with LE, proximal hip, and core control in self care, mobility, lifting, ambulation and eccentric single leg control.      NMR and Therapeutic Activities:    [] (00776 or 35499) Provided verbal/tactile cueing for activities related to improving balance, coordination, kinesthetic sense, posture, motor skill, proprioception and motor activation to allow for proper function of core, proximal hip and LE with self care and ADLs and functional mobility.   [] (48061) Gait Re-education- Provided training and instruction to the patient for proper LE, core and proximal hip recruitment and positioning and eccentric body weight control with ambulation re-education including up and down stairs     Home Exercise Program:    [x] (78168) Reviewed/Progressed HEP activities related to strengthening, flexibility, endurance, ROM of core, proximal hip and LE for functional self-care, mobility, lifting and ambulation/stair navigation   [] (59688)Reviewed/Progressed HEP activities related to improving balance, coordination, kinesthetic sense, posture, motor skill, proprioception of core, proximal hip and LE for self care, mobility, lifting, and ambulation/stair navigation      Manual Treatments:  PROM / STM / Oscillations-Mobs:  G-I, II, III, IV (PA's, Inf., Post.)  [] (57565) Provided manual therapy to mobilize LE, proximal hip and/or LS spine soft tissue/joints for the purpose of modulating pain, promoting relaxation,  increasing ROM, reducing/eliminating soft tissue swelling/inflammation/restriction, improving soft tissue extensibility and allowing for proper ROM for normal function with self care, mobility, lifting and ambulation. If [facility] Please Indicate Time In/Out  CPT Code Time in Time out                         Approval Dates:  CPT Code Units Approved Units Used  Date Updated:     96 12               Charges:  Timed Code Treatment Minutes: 45   Total Treatment Minutes: 45      [] EVAL (LOW) 33194 (typically 20 minutes face-to-face)  [] EVAL (MOD) 20999 (typically 30 minutes face-to-face)  [] EVAL (HIGH) 27071 (typically 45 minutes face-to-face)  [] RE-EVAL     [x] PZ(18567) x  2  [] Dry needle 1 or 2 Muscles (93771)  [] NMR (23479) x     [] Dry needle 3+ Muscles (65352)  [x] Manual (33603) x1     [] Ultrasound (01937) x  [] TA (06185) x     [] Mech Traction (65795)  [] ES(attended) (09756)     [] ES (un) (50450):   [] Vasopump (74519) [] Ionto (45476)   [] Other:    GOALS:  Patient stated goal: \" I want to walk and function better \"  []? Progressing: []? Met: []? Not Met: []?  Adjusted     Therapist goals for Patient: Short Term Goals: To be achieved in: 2 weeks  1. Independent in HEP and progression per patient tolerance, in order to prevent re-injury. []? Progressing: []? Met: []? Not Met: []? Adjusted  2. Patient will have a decrease in pain to facilitate improvement in movement, function, and ADLs as indicated by Functional Deficits. []? Progressing: []? Met: []? Not Met: []? Adjusted     Long Term Goals: To be achieved in: 8 weeks  1. Increase FOTO functional outcome score from 67 to 75  to assist with reaching prior level of function. []? Progressing: []? Met: []? Not Met: []? Adjusted  2. Patient will demonstrate increased AROM to wfl to allow for proper joint functioning as indicated by patients Functional Deficits. []? Progressing: []? Met: []? Not Met: []? Adjusted  3. Patient will demonstrate an increase in Strength to good proximal hip strength and control, within 5lb HHD in LE to allow for proper functional mobility as indicated by patients Functional Deficits. []? Progressing: []? Met: []? Not Met: []? Adjusted  4. Patient will return to normal function functional activities without increased symptoms or restriction. []? Progressing: []? Met: []? Not Met: []? Adjusted  5. (patient specific functional goal)    []? Progressing: []? Met: []? Not Met: []? Adjusted            ASSESSMENT:  See eval    Treatment/Activity Tolerance:  [x] Patient tolerated treatment well [] Patient limited by fatique  [] Patient limited by pain  [] Patient limited by other medical complications  [] Other:     Overall Progression Towards Functional goals/ Treatment Progress Update:  [x] Patient is progressing as expected towards functional goals listed. [] Progression is slowed due to complexities/Impairments listed. [] Progression has been slowed due to co-morbidities.   [] Plan just implemented, too soon to assess goals progression <30days   [] Goals require adjustment due to lack of progress  [] Patient is not progressing as expected and requires additional follow up with physician  [] Other    Prognosis for POC: [x] Good [] Fair  [] Poor    Patient requires continued skilled intervention: [x] Yes  [] No        PLAN: LE arom, prom  strength, proprioception, gait, balance, functional activities. Mfr, joint mobs, mods as needed, hep. Progress as tolerated, Very tight and tender in le muscles. Loosen muscles , increase flexibility , strength, propio, ecc control. 6/16/22  Pt progressing, less pain in general, walking better. Increase exs as bambi   6/23/22  He was flared up today. Odilon Pontiff it was bad after work yesterday \"    [x] Continue per plan of care [] Alter current plan (see comments)  [] Plan of care initiated [] Hold pending MD visit [] Discharge    Electronically signed by: Jose Luis Rivers PT    Note: If patient does not return for scheduled/recommended follow up visits, this note will serve as a discharge from care along with the most recent update on progress.

## 2022-06-28 ENCOUNTER — HOSPITAL ENCOUNTER (OUTPATIENT)
Dept: PHYSICAL THERAPY | Age: 62
Setting detail: THERAPIES SERIES
Discharge: HOME | End: 2022-06-28
Payer: COMMERCIAL

## 2022-06-28 NOTE — FLOWSHEET NOTE
[address] [facility] [address]  [place]  Phone: [phone]   Fax:     [phone]    Physical Therapy  Cancellation/No-show Note  Patient Name:  Elizabeth Mays  :  1960   Date:  2022  Cancelled visits to date: 1  No-shows to date: 0    Patient status for today's appointment patient:  [x]  Cancelled  []  Rescheduled appointment  []  No-show     Reason given by patient:  []  Patient ill  []  Conflicting appointment  []  No transportation    []  Conflict with work  []  No reason given  []  Other:     Comments:      Phone call information:   []  Phone call made today to patient at _ time at number provided:      []  Patient answered, conversation as follows:    []  Patient did not answer, message left as follows:  []  Phone call not made today    Electronically signed by:  Jessica Daugherty, PT

## 2022-06-30 ENCOUNTER — HOSPITAL ENCOUNTER (OUTPATIENT)
Dept: PHYSICAL THERAPY | Age: 62
Setting detail: THERAPIES SERIES
Discharge: HOME | End: 2022-06-30
Payer: COMMERCIAL

## 2022-06-30 NOTE — FLOWSHEET NOTE
[facility] [place].  [address]  Phone: [phone]   Fax:     [phone]    Physical Therapy  Cancellation/No-show Note  Patient Name:  Rosa Maria Marquez  :  1960   Date:  2022  Cancelled visits to date: 1  No-shows to date:1    Patient status for today's appointment patient:  []  Cancelled  []  Rescheduled appointment  [x]  No-show     Reason given by patient:  []  Patient ill  []  Conflicting appointment  []  No transportation    []  Conflict with work  []  No reason given  []  Other:     Comments:      Phone call information:   []  Phone call made today to patient at _ time at number provided:      []  Patient answered, conversation as follows:    []  Patient did not answer, message left as follows:  []  Phone call not made today    Electronically signed by:  Amanda Hooker, PT

## 2022-07-07 DIAGNOSIS — M25.462 PAIN AND SWELLING OF KNEE, LEFT: ICD-10-CM

## 2022-07-07 DIAGNOSIS — M25.562 PAIN AND SWELLING OF KNEE, LEFT: ICD-10-CM

## 2022-07-07 RX ORDER — DICLOFENAC SODIUM 75 MG/1
TABLET, DELAYED RELEASE ORAL
Qty: 60 TABLET | Refills: 0 | Status: SHIPPED | OUTPATIENT
Start: 2022-07-07 | End: 2022-08-11

## 2022-08-02 DIAGNOSIS — E55.9 VITAMIN D DEFICIENCY: ICD-10-CM

## 2022-08-04 ENCOUNTER — TELEPHONE (OUTPATIENT)
Dept: INTERNAL MEDICINE CLINIC | Age: 62
End: 2022-08-04

## 2022-08-04 DIAGNOSIS — I10 ESSENTIAL HYPERTENSION: ICD-10-CM

## 2022-08-04 DIAGNOSIS — I10 PRIMARY HYPERTENSION: ICD-10-CM

## 2022-08-04 DIAGNOSIS — J43.8 OTHER EMPHYSEMA (HCC): ICD-10-CM

## 2022-08-04 RX ORDER — AMLODIPINE BESYLATE 10 MG/1
TABLET ORAL
Qty: 90 TABLET | Refills: 0 | Status: SHIPPED | OUTPATIENT
Start: 2022-08-04 | End: 2022-09-29 | Stop reason: SDUPTHER

## 2022-08-04 RX ORDER — ASPIRIN 81 MG/1
TABLET, CHEWABLE ORAL
Qty: 30 TABLET | Refills: 3 | Status: SHIPPED | OUTPATIENT
Start: 2022-08-04

## 2022-08-04 RX ORDER — TIOTROPIUM BROMIDE 18 UG/1
18 CAPSULE ORAL; RESPIRATORY (INHALATION) DAILY
Qty: 90 CAPSULE | Refills: 0 | Status: SHIPPED | OUTPATIENT
Start: 2022-08-04 | End: 2022-09-29 | Stop reason: SDUPTHER

## 2022-08-04 RX ORDER — HYDROCHLOROTHIAZIDE 25 MG/1
25 TABLET ORAL EVERY MORNING
Qty: 90 TABLET | Refills: 0 | Status: SHIPPED | OUTPATIENT
Start: 2022-08-04 | End: 2022-09-29 | Stop reason: SDUPTHER

## 2022-08-04 RX ORDER — LABETALOL 300 MG/1
300 TABLET, FILM COATED ORAL DAILY
Qty: 90 TABLET | Refills: 0 | Status: SHIPPED | OUTPATIENT
Start: 2022-08-04 | End: 2022-09-29 | Stop reason: SDUPTHER

## 2022-08-04 NOTE — TELEPHONE ENCOUNTER
Patients pharmacy sent a refill request, the patient thinks that these are the prescriptions that are needed  Spiriva HandiHaler  [facility]   Amlodipine  Hydrochlorothiazide  Labetalol  Theriot advise

## 2022-08-05 RX ORDER — ACETAMINOPHEN 160 MG
TABLET,DISINTEGRATING ORAL
Qty: 90 CAPSULE | Refills: 0 | Status: SHIPPED | OUTPATIENT
Start: 2022-08-05 | End: 2022-09-29 | Stop reason: SDUPTHER

## 2022-08-11 DIAGNOSIS — M25.562 PAIN AND SWELLING OF KNEE, LEFT: ICD-10-CM

## 2022-08-11 DIAGNOSIS — M25.462 PAIN AND SWELLING OF KNEE, LEFT: ICD-10-CM

## 2022-08-11 RX ORDER — DICLOFENAC SODIUM 75 MG/1
TABLET, DELAYED RELEASE ORAL
Qty: 60 TABLET | Refills: 0 | Status: SHIPPED | OUTPATIENT
Start: 2022-08-11 | End: 2022-09-19

## 2022-09-18 DIAGNOSIS — M25.562 PAIN AND SWELLING OF KNEE, LEFT: ICD-10-CM

## 2022-09-18 DIAGNOSIS — M25.462 PAIN AND SWELLING OF KNEE, LEFT: ICD-10-CM

## 2022-09-19 RX ORDER — DICLOFENAC SODIUM 75 MG/1
TABLET, DELAYED RELEASE ORAL
Qty: 60 TABLET | Refills: 0 | Status: SHIPPED | OUTPATIENT
Start: 2022-09-19 | End: 2022-10-19

## 2022-09-19 NOTE — TELEPHONE ENCOUNTER
Medication:   Requested Prescriptions     Pending Prescriptions Disp Refills    diclofenac (VOLTAREN) 75 MG EC tablet [Pharmacy Med Name: DICLOFENAC SOD EC 75 MG TAB] 60 tablet 0     Sig: TAKE 1 TABLET BY MOUTH TWICE A DAY AS NEEDED FOR PAIN        Last Filled:  08/11/22    Patient Phone Number: [phone] (home)     Last appt: 6/6/2022   Next appt: 9/29/2022

## 2022-09-29 ENCOUNTER — OFFICE VISIT (OUTPATIENT)
Dept: INTERNAL MEDICINE CLINIC | Age: 62
End: 2022-09-29
Payer: COMMERCIAL

## 2022-09-29 VITALS
BODY MASS INDEX: 26.79 KG/M2 | SYSTOLIC BLOOD PRESSURE: 126 MMHG | HEART RATE: 93 BPM | WEIGHT: 161 LBS | OXYGEN SATURATION: 97 % | DIASTOLIC BLOOD PRESSURE: 78 MMHG

## 2022-09-29 DIAGNOSIS — M25.562 PAIN AND SWELLING OF KNEE, LEFT: ICD-10-CM

## 2022-09-29 DIAGNOSIS — I10 PRIMARY HYPERTENSION: ICD-10-CM

## 2022-09-29 DIAGNOSIS — J43.8 OTHER EMPHYSEMA (HCC): Primary | ICD-10-CM

## 2022-09-29 DIAGNOSIS — N52.8 OTHER MALE ERECTILE DYSFUNCTION: ICD-10-CM

## 2022-09-29 DIAGNOSIS — M25.462 PAIN AND SWELLING OF KNEE, LEFT: ICD-10-CM

## 2022-09-29 DIAGNOSIS — E55.9 VITAMIN D DEFICIENCY: ICD-10-CM

## 2022-09-29 DIAGNOSIS — F17.210 SMOKES CIGARETTES: ICD-10-CM

## 2022-09-29 PROCEDURE — 99214 OFFICE O/P EST MOD 30 MIN: CPT | Performed by: INTERNAL MEDICINE

## 2022-09-29 PROCEDURE — G8427 DOCREV CUR MEDS BY ELIG CLIN: HCPCS | Performed by: INTERNAL MEDICINE

## 2022-09-29 PROCEDURE — 3023F SPIROM DOC REV: CPT | Performed by: INTERNAL MEDICINE

## 2022-09-29 PROCEDURE — 4004F PT TOBACCO SCREEN RCVD TLK: CPT | Performed by: INTERNAL MEDICINE

## 2022-09-29 PROCEDURE — 99406 BEHAV CHNG SMOKING 3-10 MIN: CPT | Performed by: INTERNAL MEDICINE

## 2022-09-29 PROCEDURE — G8419 CALC BMI OUT NRM PARAM NOF/U: HCPCS | Performed by: INTERNAL MEDICINE

## 2022-09-29 PROCEDURE — 3017F COLORECTAL CA SCREEN DOC REV: CPT | Performed by: INTERNAL MEDICINE

## 2022-09-29 RX ORDER — TIOTROPIUM BROMIDE 18 UG/1
18 CAPSULE ORAL; RESPIRATORY (INHALATION) DAILY
Qty: 90 CAPSULE | Refills: 0 | Status: SHIPPED | OUTPATIENT
Start: 2022-09-29

## 2022-09-29 RX ORDER — LABETALOL 300 MG/1
300 TABLET, FILM COATED ORAL DAILY
Qty: 90 TABLET | Refills: 0 | Status: SHIPPED | OUTPATIENT
Start: 2022-09-29

## 2022-09-29 RX ORDER — HYDROCHLOROTHIAZIDE 25 MG/1
25 TABLET ORAL EVERY MORNING
Qty: 90 TABLET | Refills: 0 | Status: SHIPPED | OUTPATIENT
Start: 2022-09-29

## 2022-09-29 RX ORDER — TADALAFIL 5 MG/1
5 TABLET ORAL DAILY PRN
Qty: 30 TABLET | Refills: 2 | Status: SHIPPED | OUTPATIENT
Start: 2022-09-29

## 2022-09-29 RX ORDER — NICOTINE 21 MG/24HR
1 PATCH, TRANSDERMAL 24 HOURS TRANSDERMAL DAILY
Qty: 14 PATCH | Refills: 5 | Status: SHIPPED | OUTPATIENT
Start: 2022-09-29 | End: 2022-10-13

## 2022-09-29 RX ORDER — AMLODIPINE BESYLATE 10 MG/1
TABLET ORAL
Qty: 90 TABLET | Refills: 0 | Status: SHIPPED | OUTPATIENT
Start: 2022-09-29

## 2022-09-29 RX ORDER — ACETAMINOPHEN 160 MG
TABLET,DISINTEGRATING ORAL
Qty: 90 CAPSULE | Refills: 0 | Status: SHIPPED | OUTPATIENT
Start: 2022-09-29

## 2022-09-29 NOTE — PROGRESS NOTES
SUBJECTIVE:  Kaylan Guerra is a 63 y.o. male [address]   Chief Complaint   Patient presents with    Follow-up      PT HERE FOR EVAL     EMPHYSEMA -  DENIES SOB, NO  WHEEZING. Cheryn Moritz. NO INCREASED INHALER USE   HTN - TAKING MEDS . BP NOTED. ? DIET / EXERCISE COMPLIANCE. NO HEADACHE, NO DIZZINESS   LT KNEE PAIN - INTERMITTENT. SHARP PAIN, OCC SWELLING . NO RAD, PAIN SCALE 8/10, DENIES TRAUMA. FOLLOWING WITH ORTHO. VIT D DEF -  TAKING MED. IMPROVING -  LAB D/W DPT  ED - ON MED - HELPING  + SMOKER - CESSATION READDRESSED     DENIES CP, NO SOB , No PALPITATIONS, NO COUGH, NO F/C  No ABD PAIN, No N/V, No DIARRHEA, No CONSTIPATION, No MELENA, No HEMATOCHEZIA. No DYSURIA, No FREQ, No URGENCY, No HEMATURIA    PMH: REVIEWED AND UPDATED TODAY    PSH: REVIEWED AND UPDATED TODAY    SOCIAL HX: REVIEWED AND UPDATED TODAY    FAMILY HX: REVIEWED AND UPDATED TODAY    ALLERGY:  Pcn [penicillins]    MEDS: REVIEWED  Prior to Visit Medications    Medication Sig Taking? Authorizing Provider   diclofenac (VOLTAREN) 75 MG EC tablet TAKE 1 TABLET BY MOUTH TWICE A DAY AS NEEDED FOR PAIN Yes Gisele Vargas MD   Cholecalciferol (VITAMIN D3) 50 MCG (2000 UT) CAPS TAKE 1 CAPSULE BY MOUTH EVERY DAY Yes Gisele Vargas MD   tiotropium (SPIRIVA HANDIHALER) 18 MCG inhalation capsule Inhale 1 capsule into the lungs in the morning. Yes Gisele Vargas MD   aspirin ([facility] ASPIRIN ADULT LOW DOSE) 81 MG chewable tablet TAKE 1 TABLET BY MOUTH EVERY DAY Yes Gisele Vargas MD   amLODIPine (NORVASC) 10 MG tablet TAKE 1 TABLET BY MOUTH EVERY DAY Yes Gisele Vargas MD   hydroCHLOROthiazide (HYDRODIURIL) 25 MG tablet Take 1 tablet by mouth every morning Yes Gisele Vargas MD   labetalol (NORMODYNE) 300 MG tablet Take 1 tablet by mouth in the morning. Yes Gisele Vargas MD   tadalafil (CIALIS) 5 MG tablet Take 1 tablet by mouth daily as needed for Erectile Dysfunction Take one tablet daily as needed for erectile dysfunction.  Yes Rudi Qiu Milli Perry MD   albuterol sulfate HFA (VENTOLIN HFA) 108 (90 Base) MCG/ACT inhaler Inhale 2 puffs into the lungs every 6 hours as needed for Wheezing or Shortness of Breath Yes Nicholas Davey MD   varenicline (CHANTIX CONTINUING MONTH ROSARIO) 1 MG tablet Take 1 tablet by mouth 2 times daily Yes Nelson Flatten, APRN   acetaminophen (TYLENOL) 325 MG tablet Take 650 mg by mouth as needed Yes Historical Provider, MD   meloxicam (MOBIC) 7.5 MG tablet Take 1 tablet by mouth daily as needed for Pain  Nicholas Davey MD       ROS: COMPREHENSIVE ROS AS IN HX, REST -VE  History obtained from chart review and the patient       OBJECTIVE:   NURSING NOTE AND VITALS REVIEWED  BP (!) 130/90 (Site: Left Upper Arm, Position: Sitting, Cuff Size: Large Adult)   Pulse 93   Wt 161 lb (73 kg)   SpO2 97%   BMI 26.79 kg/m²     NO ACUTE DISTRESS  + NICOTINE BREATH    REPEAT BP: 126/78 (LT), NO ORTHOSTASIS     Body mass index is 26.79 kg/m². HEENT: NO PALLOR, ANICTERIC, PERRLA, EOMI, NO CONJUNCTIVAL ERYTHEMA,                 NO SINUS TENDERNESS  NECK:  SUPPLE, TRACHEA MIDLINE, NT, NO JVD, NO CB, NO LA, NO TM, NO STIFFNESS  CHEST: RESPY EFFORT NL, GOOD AE, NO W/R/C  HEART: S1S2+ REG, NO M/G/R  ABD: SOFT, NT, NO HSM, BS+  EXT: NO EDEMA, NT, PULSES +. SARA'S -VE  NEURO: ALERT AND ORIENTED X 3, NO MENINGEAL SIGNS, NO TREMORS, NL GAIT, NO FOCAL DEFICITS  PSYCH: FAIRLY GOOD AFFECT  BACK: NT, NO ROM, NO CVA TENDERNESS  KNEE:  + SWELLING - LT, + TENDERNESS, + PAIN WITH MVT - LT, OCC ROM - LT       PREVIOUS LABS REVIEWED AND D/W PT / LAST LABS PENDING    ASSESSMENT / PLAN:     Diagnosis Orders   1. Other emphysema ([facility] [address]  COUNSELLED. [address] ALBUTEROL PRN. MONITOR. ADVISED ON NEED FOR SMOKING CESSATION  PT VERBALIZED UNDERSTANDING   MAKE CHANGES AS NEEDED. 2. Primary hypertension  COUNSELLED. REPEAT BP AS ABOVE. CONTINUE MEDS. ADVISED LOW NA+ / DASH DIET/ EXERCISE.   MONITOR.  GOAL </= 130/80  R/U LABS  MAKE CHANGES AS NEEDED. 3. Pain and swelling of knee, left  COUNSELLED. OA. EXERCISES. ANALGESICS PRN. MONITOR. DICLOFENAC PRN WITH FOOD. F/U ORTHO  ADVISED HOME EXERCISES  MAKE CHANGES AS NEEDED. 4. Vitamin D deficiency  COUNSELLED. MONITOR ON VIT D SUPPLEMENT. MAKE CHANGES AS NEEDED. 5. Other male erectile dysfunction  COUNSELLED. CIALIS 5 MG REFILLED - S/E D/W PT  MONITOR. MAKE CHANGES AS NEEDED. 6. Smokes cigarettes  Smoking cessation was encouraged. Cessation techniques reviewed today. COUNSELLED. CESSATION ADVISED   IN TOBACCO USE CESSATION INTERMEDIATE 8-97 MINUTES  COMPLICATIONS OF TOBACCO USE INCLUDING COPD, CANCER, CAD D/W PT  START ON NICOTINE PATCHES - S/E D/W PT  ADVISED TO SET QUIT DATE  PT VERBALIZED UNDERSTANDING  MAKE CHANGES AS NEEDED.                  PT DECLINED INFLUENZA VACCINE         MEDICATION SIDE EFFECTS D/W PATIENT    RETURN TO CLINIC WITHIN 4 MONTHS / PRN    FOLLOW UP FOR LABS

## 2022-09-30 ENCOUNTER — TELEPHONE (OUTPATIENT)
Dept: ORTHOPEDIC SURGERY | Age: 62
End: 2022-09-30

## 2022-09-30 NOTE — TELEPHONE ENCOUNTER
Submitted PA for Tadalafil 5MG tablets  Via CMM Key: Key: [id number] STATUS: DENIED - Plan Exclusion; Denial letter attached. If this requires a response please respond to the pool ( P [id number] [facility]). Thank you please advise patient.

## 2022-10-11 DIAGNOSIS — E55.9 VITAMIN D DEFICIENCY: ICD-10-CM

## 2022-10-11 DIAGNOSIS — Z12.5 SCREENING FOR PROSTATE CANCER: ICD-10-CM

## 2022-10-11 DIAGNOSIS — I10 PRIMARY HYPERTENSION: ICD-10-CM

## 2022-10-11 LAB
A/G RATIO: 1.6 (ref 1.1–2.2)
ALBUMIN SERPL-MCNC: 4.4 G/DL (ref 3.4–5)
ALP BLD-CCNC: 82 U/L (ref 40–129)
ALT SERPL-CCNC: 9 U/L (ref 10–40)
ANION GAP SERPL CALCULATED.3IONS-SCNC: 13 MMOL/L (ref 3–16)
AST SERPL-CCNC: 15 U/L (ref 15–37)
BILIRUB SERPL-MCNC: 0.3 MG/DL (ref 0–1)
BUN BLDV-MCNC: 18 MG/DL (ref 7–20)
CALCIUM SERPL-MCNC: 9.7 MG/DL (ref 8.3–10.6)
CHLORIDE BLD-SCNC: 102 MMOL/L (ref 99–110)
CHOLESTEROL, TOTAL: 173 MG/DL (ref 0–199)
CO2: 25 MMOL/L (ref 21–32)
CREAT SERPL-MCNC: 1.1 MG/DL (ref 0.8–1.3)
GFR AFRICAN AMERICAN: >60
GFR NON-AFRICAN AMERICAN: >60
GLUCOSE BLD-MCNC: 105 MG/DL (ref 70–99)
HDLC SERPL-MCNC: 42 MG/DL (ref 40–60)
LDL CHOLESTEROL CALCULATED: 109 MG/DL
POTASSIUM SERPL-SCNC: 3.4 MMOL/L (ref 3.5–5.1)
PROSTATE SPECIFIC ANTIGEN: 0.96 NG/ML (ref 0–4)
SODIUM BLD-SCNC: 140 MMOL/L (ref 136–145)
TOTAL PROTEIN: 7.2 G/DL (ref 6.4–8.2)
TRIGL SERPL-MCNC: 109 MG/DL (ref 0–150)
VITAMIN D 25-HYDROXY: 48.4 NG/ML
VLDLC SERPL CALC-MCNC: 22 MG/DL

## 2022-10-18 DIAGNOSIS — M25.562 PAIN AND SWELLING OF KNEE, LEFT: ICD-10-CM

## 2022-10-18 DIAGNOSIS — M25.462 PAIN AND SWELLING OF KNEE, LEFT: ICD-10-CM

## 2022-10-19 RX ORDER — DICLOFENAC SODIUM 75 MG/1
TABLET, DELAYED RELEASE ORAL
Qty: 60 TABLET | Refills: 0 | Status: SHIPPED | OUTPATIENT
Start: 2022-10-19

## 2022-10-19 NOTE — TELEPHONE ENCOUNTER
Medication:   Requested Prescriptions     Pending Prescriptions Disp Refills    diclofenac (VOLTAREN) 75 MG EC tablet [Pharmacy Med Name: DICLOFENAC SOD EC 75 MG TAB] 60 tablet 0     Sig: TAKE 1 TABLET BY MOUTH TWICE A DAY AS NEEDED FOR PAIN        Last Filled: 09/19/22    Patient Phone Number: [phone] (home)     Last appt: 9/29/2022   Next appt: 1/30/2023

## 2022-11-17 DIAGNOSIS — M25.462 PAIN AND SWELLING OF KNEE, LEFT: ICD-10-CM

## 2022-11-17 DIAGNOSIS — E55.9 VITAMIN D DEFICIENCY: ICD-10-CM

## 2022-11-17 DIAGNOSIS — M25.562 PAIN AND SWELLING OF KNEE, LEFT: ICD-10-CM

## 2022-11-18 NOTE — TELEPHONE ENCOUNTER
Medication:   Requested Prescriptions     Pending Prescriptions Disp Refills    Cholecalciferol (VITAMIN D3) 50 MCG (2000 UT) CAPS [Pharmacy Med Name: VITAMIN D3 50 MCG SOFTGEL] 90 capsule 0     Sig: TAKE 1 CAPSULE BY MOUTH EVERY DAY    diclofenac (VOLTAREN) 75 MG EC tablet [Pharmacy Med Name: DICLOFENAC SOD EC 75 MG TAB] 60 tablet 0     Sig: TAKE 1 TABLET BY MOUTH TWICE A DAY AS NEEDED FOR PAIN        Last Filled:    Vitamin D 09/29/22  Voltaren 10/19/22      Patient Phone Number: [phone] (home)     Last appt: 9/29/2022   Next appt: 1/30/2023

## 2022-11-22 RX ORDER — DICLOFENAC SODIUM 75 MG/1
TABLET, DELAYED RELEASE ORAL
Qty: 60 TABLET | Refills: 0 | Status: SHIPPED | OUTPATIENT
Start: 2022-11-22

## 2022-11-22 RX ORDER — ACETAMINOPHEN 160 MG
TABLET,DISINTEGRATING ORAL
Qty: 90 CAPSULE | Refills: 0 | Status: SHIPPED | OUTPATIENT
Start: 2022-11-22

## 2022-12-17 DIAGNOSIS — I10 ESSENTIAL HYPERTENSION: ICD-10-CM

## 2022-12-19 RX ORDER — ASPIRIN 81 MG/1
TABLET, CHEWABLE ORAL
Qty: 90 TABLET | Refills: 1 | Status: SHIPPED | OUTPATIENT
Start: 2022-12-19

## 2022-12-19 NOTE — TELEPHONE ENCOUNTER
Medication:   Requested Prescriptions     Pending Prescriptions Disp Refills    aspirin (ASPIRIN LOW DOSE) 81 MG chewable tablet [Pharmacy Med Name: ASPIRIN 81 MG CHEWABLE TABLET] 90 tablet 1     Sig: TAKE 1 TABLET BY MOUTH EVERY DAY        Last Filled:  11/17/2022    Patient Phone Number: [phone] (home)     Last appt: 9/29/2022   Next appt: 1/30/2023    Last OARRS: No flowsheet data found.

## 2022-12-20 DIAGNOSIS — M25.462 PAIN AND SWELLING OF KNEE, LEFT: ICD-10-CM

## 2022-12-20 DIAGNOSIS — M25.562 PAIN AND SWELLING OF KNEE, LEFT: ICD-10-CM

## 2022-12-20 NOTE — TELEPHONE ENCOUNTER
Medication:   Requested Prescriptions     Pending Prescriptions Disp Refills    diclofenac (VOLTAREN) 75 MG EC tablet [Pharmacy Med Name: DICLOFENAC SOD EC 75 MG TAB] 60 tablet 0     Sig: TAKE 1 TABLET BY MOUTH TWICE A DAY AS NEEDED FOR PAIN        Last Filled:  11/22/2022    Patient Phone Number: [phone] (home)     Last appt: 9/29/2022   Next appt: 1/30/2023    Last OARRS: No flowsheet data found.

## 2022-12-22 RX ORDER — DICLOFENAC SODIUM 75 MG/1
TABLET, DELAYED RELEASE ORAL
Qty: 60 TABLET | Refills: 0 | Status: SHIPPED | OUTPATIENT
Start: 2022-12-22

## 2023-02-04 DIAGNOSIS — M25.462 PAIN AND SWELLING OF KNEE, LEFT: ICD-10-CM

## 2023-02-04 DIAGNOSIS — M25.562 PAIN AND SWELLING OF KNEE, LEFT: ICD-10-CM

## 2023-02-06 NOTE — TELEPHONE ENCOUNTER
Medication:   Requested Prescriptions     Pending Prescriptions Disp Refills    diclofenac (VOLTAREN) 75 MG EC tablet [Pharmacy Med Name: DICLOFENAC SOD EC 75 MG TAB] 60 tablet 0     Sig: TAKE 1 TABLET BY MOUTH TWICE A DAY AS NEEDED FOR PAIN        Last Filled: 12/22/22     Patient Phone Number: [phone] (home)     Last appt: 9/29/2022   Next appt: Visit date not found

## 2023-02-08 RX ORDER — DICLOFENAC SODIUM 75 MG/1
TABLET, DELAYED RELEASE ORAL
Qty: 60 TABLET | Refills: 0 | Status: SHIPPED | OUTPATIENT
Start: 2023-02-08

## 2023-02-22 DIAGNOSIS — I10 ESSENTIAL HYPERTENSION: ICD-10-CM

## 2023-02-23 ENCOUNTER — TELEPHONE (OUTPATIENT)
Dept: CASE MANAGEMENT | Age: 63
End: 2023-02-23

## 2023-02-23 NOTE — TELEPHONE ENCOUNTER
First Lung Cancer Screening Recommendation Reminder Letter mailed to patient. Most recent smoking history reviewed and ALA smoking cessation [id number] [facility] [address] class information mailed to patient.

## 2023-02-28 RX ORDER — ASPIRIN 81 MG/1
TABLET, CHEWABLE ORAL
Qty: 30 TABLET | Refills: 0 | Status: SHIPPED | OUTPATIENT
Start: 2023-02-28

## 2023-03-09 ENCOUNTER — OFFICE VISIT (OUTPATIENT)
Dept: INTERNAL MEDICINE CLINIC | Age: 63
End: 2023-03-09

## 2023-03-09 VITALS
TEMPERATURE: 98.7 F | DIASTOLIC BLOOD PRESSURE: 78 MMHG | OXYGEN SATURATION: 97 % | SYSTOLIC BLOOD PRESSURE: 120 MMHG | WEIGHT: 149 LBS | HEART RATE: 66 BPM | BODY MASS INDEX: 24.79 KG/M2

## 2023-03-09 DIAGNOSIS — F17.210 SMOKES CIGARETTES: ICD-10-CM

## 2023-03-09 DIAGNOSIS — N52.8 OTHER MALE ERECTILE DYSFUNCTION: ICD-10-CM

## 2023-03-09 DIAGNOSIS — J43.8 OTHER EMPHYSEMA (HCC): ICD-10-CM

## 2023-03-09 DIAGNOSIS — E87.6 HYPOKALEMIA: ICD-10-CM

## 2023-03-09 DIAGNOSIS — I10 PRIMARY HYPERTENSION: Primary | ICD-10-CM

## 2023-03-09 DIAGNOSIS — M25.562 ACUTE PAIN OF LEFT KNEE: ICD-10-CM

## 2023-03-09 DIAGNOSIS — E55.9 VITAMIN D DEFICIENCY: ICD-10-CM

## 2023-03-09 RX ORDER — HYDROCHLOROTHIAZIDE 25 MG/1
25 TABLET ORAL EVERY MORNING
Qty: 90 TABLET | Refills: 0 | Status: SHIPPED | OUTPATIENT
Start: 2023-03-09

## 2023-03-09 RX ORDER — AMLODIPINE BESYLATE 10 MG/1
TABLET ORAL
Qty: 90 TABLET | Refills: 0 | Status: SHIPPED | OUTPATIENT
Start: 2023-03-09

## 2023-03-09 RX ORDER — TADALAFIL 5 MG/1
5 TABLET ORAL DAILY PRN
Qty: 30 TABLET | Refills: 2 | Status: SHIPPED | OUTPATIENT
Start: 2023-03-09

## 2023-03-09 RX ORDER — LABETALOL 300 MG/1
300 TABLET, FILM COATED ORAL DAILY
Qty: 90 TABLET | Refills: 0 | Status: SHIPPED | OUTPATIENT
Start: 2023-03-09

## 2023-03-09 RX ORDER — TIOTROPIUM BROMIDE 18 UG/1
18 CAPSULE ORAL; RESPIRATORY (INHALATION) DAILY
Qty: 90 CAPSULE | Refills: 0 | Status: SHIPPED | OUTPATIENT
Start: 2023-03-09

## 2023-03-09 RX ORDER — ACETAMINOPHEN 160 MG
1 TABLET,DISINTEGRATING ORAL DAILY
Qty: 90 CAPSULE | Refills: 0 | Status: SHIPPED | OUTPATIENT
Start: 2023-03-09

## 2023-03-09 ASSESSMENT — PATIENT HEALTH QUESTIONNAIRE - PHQ9
SUM OF ALL RESPONSES TO PHQ QUESTIONS 1-9: 0
SUM OF ALL RESPONSES TO PHQ QUESTIONS 1-9: 0
1. LITTLE INTEREST OR PLEASURE IN DOING THINGS: 0
SUM OF ALL RESPONSES TO PHQ QUESTIONS 1-9: 0
SUM OF ALL RESPONSES TO PHQ9 QUESTIONS 1 & 2: 0
2. FEELING DOWN, DEPRESSED OR HOPELESS: 0
SUM OF ALL RESPONSES TO PHQ QUESTIONS 1-9: 0

## 2023-03-09 NOTE — PATIENT INSTRUCTIONS
TAKE MED AS ADVISED    DIET/ EXERCISE. FOLLOW UP WITHIN 4 MONTHS / AS NEEDED    FOLLOW UP FOR  [address] [place] [address] Laboratory Locations - No appointment necessary. @ indicates the location is open Saturdays in addition to Monday through Friday. Call your preferred location for test preparation, business hours and other information you need. [employer] accepts [employer]. [facility]     @ [address] [facility] [address] [facility]. [place], [facility] [address]    Ph: [address]   [address] [facility], [address]    Ph: [phone]   @ [address] [place] [address].,    [facility]    Ph: [address]    Ph: [phone]  @ [address] [place] [address]   Ph: [phone]  @ [address] [place] [address]. [facility] [address]    Ph: [address] [place], [place] [address]   Ph: [phone]    [place]    @ [facility]. [place], [place] [address]    Ph: [phone]  [address]   Ph: Ysbertha 84 [facility]. [address] HOSPITAL: 311 [facility] Byron Werner    Ph: [phone]   [place] [id number] 2026 [facility]. Byron Noel   Ph: 501 [facility] Med.  [address], [place] [address]    Ph: [phone]

## 2023-03-09 NOTE — PROGRESS NOTES
SUBJECTIVE:  Adán Ramesh is a 63 y.o. male HERE FOR   Chief Complaint   Patient presents with    Follow-up    Hypertension      PT HERE FOR EVAL     HTN - TAKING MEDS . BP NOTED. ? DIET / EXERCISE COMPLIANCE. NO HEADACHE, NO DIZZINESS   EMPHYSEMA -  NO  WHEEZING, NO SOB. NO INCREASED INHALER USE. Zuleika Banana. LT KNEE PAIN - INTERMITTENT. SHARP PAIN, OCC SWELLING . NO RAD, PAIN SCALE 7/10, DENIES TRAUMA. VIT D DEF -  TAKING MED.  IMPROVED - LAB D/W DPT  ED - ON MED - HELPING  HYPOKALEMIA - NOTED ON LAB. ? NO MUSCLE ACHES  + SMOKER - CESSATION READDRESSED     DENIES CP, NO SOB , No PALPITATIONS, NO COUGH, NO F/C  No ABD PAIN, No N/V, No DIARRHEA, No CONSTIPATION, No MELENA, No HEMATOCHEZIA. No DYSURIA, No FREQ, No URGENCY, No HEMATURIA      PMH: REVIEWED AND UPDATED TODAY    PSH: REVIEWED AND UPDATED TODAY    SOCIAL HX: REVIEWED AND UPDATED TODAY    FAMILY HX: REVIEWED AND UPDATED TODAY    ALLERGY:  Pcn [penicillins]    MEDS: REVIEWED  Prior to Visit Medications    Medication Sig Taking? Authorizing Provider   aspirin (ASPIRIN LOW DOSE) 81 MG chewable tablet TAKE 1 TABLET BY MOUTH EVERY DAY Yes Jim Babcock MD   diclofenac (VOLTAREN) 75 MG EC tablet TAKE 1 TABLET BY MOUTH TWICE A DAY AS NEEDED FOR PAIN Yes Jim Babcock MD   Cholecalciferol (VITAMIN D3) 50 MCG (2000 UT) CAPS TAKE 1 CAPSULE BY MOUTH EVERY DAY Yes Jim Babcock MD   tiotropium (Edrie Blade) 18 MCG inhalation capsule Inhale 1 capsule into the lungs daily Yes Jim Babcock MD   amLODIPine (NORVASC) 10 MG tablet TAKE 1 TABLET BY MOUTH EVERY DAY Yes Jim Babcock MD   hydroCHLOROthiazide (HYDRODIURIL) 25 MG tablet Take 1 tablet by mouth every morning Yes Jim Babcock MD   labetalol (NORMODYNE) 300 MG tablet Take 1 tablet by mouth daily Yes Jim Babcock MD   tadalafil (CIALIS) 5 MG tablet Take 1 tablet by mouth daily as needed for Erectile Dysfunction Take one tablet daily as needed for erectile dysfunction.  Yes Maribell Haines MD   albuterol sulfate HFA (VENTOLIN HFA) 108 (90 Base) MCG/ACT inhaler Inhale 2 puffs into the lungs every 6 hours as needed for Wheezing or Shortness of Breath Yes Maribell Haines MD   acetaminophen (TYLENOL) 325 MG tablet Take 650 mg by mouth as needed Yes Historical Provider, MD   nicotine (NICODERM CQ) 14 MG/24HR Place 1 patch onto the skin daily for 14 days  Maribell Haines MD   meloxicam (MOBIC) 7.5 MG tablet Take 1 tablet by mouth daily as needed for Pain  Maribell Haines MD       ROS: COMPREHENSIVE ROS AS IN HX, REST -VE  History obtained from chart review and the patient       OBJECTIVE:   NURSING NOTE AND VITALS REVIEWED  /80   Pulse 58   Temp 98.7 °F (37.1 °C)   Wt 149 lb (67.6 kg)   SpO2 97%   BMI 24.79 kg/m²     NO ACUTE DISTRESS  + NICOTINE BREATH    REPEAT BP: 120/78 (RT), NO ORTHOSTASIS     REPEAT PULSE: 66 - MANUAL    Body mass index is 24.79 kg/m². HEENT: NO PALLOR, ANICTERIC, PERRLA, EOMI, NO CONJUNCTIVAL ERYTHEMA,                 NO SINUS TENDERNESS  NECK:  SUPPLE, TRACHEA MIDLINE, NT, NO JVD, NO CB, NO LA, NO TM, NO STIFFNESS  CHEST: RESPY EFFORT NL, GOOD AE, NO W/R/C  HEART: S1S2+ REG, NO M/G/R  ABD: SOFT, NT, NO HSM, BS+  EXT: NO EDEMA, NT, PULSES +. SARA'S -VE  NEURO: ALERT AND ORIENTED X 3, NO MENINGEAL SIGNS, NO TREMORS, NL GAIT, NO FOCAL DEFICITS  PSYCH: FAIRLY GOOD AFFECT  BACK: NT, NO ROM, NO CVA TENDERNESS   KNEE: NT, NO ROM    PREVIOUS LABS REVIEWED AND D/W PT    ASSESSMENT / PLAN:     Diagnosis Orders   1. Primary hypertension  COUNSELLED. CONTROLLED ON MEDS. ADVISED LOW NA+ / DASH DIET/ EXERCISE. MONITOR. GOAL </= 130/80  F/U LABS  MAKE CHANGES AS NEEDED. 2. Other emphysema ([address]  COUNSELLED. CONTINUE SPIRIVA  READDRESSED NEED FOR SMOKING CESSTION  MAKE CHANGES AS NEEDED. 3. Acute pain of left knee  COUNSELLED. ? OA. EXERCISES. ANALGESICS PRN. MONITOR. READDRESS ORTHO REEVAL  MAKE CHANGES AS NEEDED.         4. Vitamin D deficiency COUNSELLED. MONITOR ON VIT D SUPPLEMENT. MAKE CHANGES AS NEEDED. 5. Other male erectile dysfunction  COUNSELLED. CIALIS 5 MG REFILLED AS REQUESTED - S/E D/W PT  MAKE CHANGES AS NEEDED. 6. Hypokalemia  COUNSELLED. ADVISED FOODS HIGH IN K+. MONITOR LABS   MAKE CHANGES AS NEEDED       7. Smokes cigarettes  Smoking cessation was encouraged. Cessation techniques reviewed today. COUNSELLED. CESSATION ADVISED   DE TOBACCO USE CESSATION INTERMEDIATE 3-10 MINUTES (5 MINS)  COMPLICATIONS OF TOBACCO USE INCLUDING COPD, CANCER, CAD D/W PT  PT VERBALIZED UNDERSTANDING  MAKE CHANGES AS NEEDED.                        MEDICATION SIDE EFFECTS D/W PATIENT      RETURN TO CLINIC WITHIN 4 MONTHS / PRN    FOLLOW UP FOR  LABS

## 2023-03-19 DIAGNOSIS — M25.562 PAIN AND SWELLING OF KNEE, LEFT: ICD-10-CM

## 2023-03-19 DIAGNOSIS — M25.462 PAIN AND SWELLING OF KNEE, LEFT: ICD-10-CM

## 2023-03-20 NOTE — TELEPHONE ENCOUNTER
Medication:   Requested Prescriptions     Pending Prescriptions Disp Refills    diclofenac (VOLTAREN) 75 MG EC tablet [Pharmacy Med Name: DICLOFENAC SOD EC 75 MG TAB] 60 tablet 0     Sig: TAKE 1 TABLET BY MOUTH TWICE A DAY AS NEEDED FOR PAIN     Last Filled:  02/08/2023    Last appt: 3/9/2023   Next appt: 7/10/2023

## 2023-03-23 RX ORDER — DICLOFENAC SODIUM 75 MG/1
TABLET, DELAYED RELEASE ORAL
Qty: 60 TABLET | Refills: 0 | Status: SHIPPED | OUTPATIENT
Start: 2023-03-23

## 2023-04-13 ENCOUNTER — APPOINTMENT (OUTPATIENT)
Dept: GENERAL RADIOLOGY | Age: 63
End: 2023-04-13
Payer: COMMERCIAL

## 2023-04-13 ENCOUNTER — HOSPITAL ENCOUNTER (EMERGENCY)
Age: 63
Discharge: HOME | End: 2023-04-13
Payer: COMMERCIAL

## 2023-04-13 VITALS
SYSTOLIC BLOOD PRESSURE: 160 MMHG | DIASTOLIC BLOOD PRESSURE: 85 MMHG | HEART RATE: 57 BPM | RESPIRATION RATE: 18 BRPM | BODY MASS INDEX: 25.83 KG/M2 | TEMPERATURE: 98.9 F | OXYGEN SATURATION: 98 % | WEIGHT: 155.2 LBS

## 2023-04-13 DIAGNOSIS — M79.602 PAIN OF LEFT UPPER EXTREMITY: ICD-10-CM

## 2023-04-13 DIAGNOSIS — F17.200 SMOKER: ICD-10-CM
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
## 2023-04-13 PROCEDURE — 6370000000 HC RX 637 (ALT 250 FOR IP)

## 2023-04-13 PROCEDURE — 73060 X-RAY EXAM OF HUMERUS: CPT

## 2023-04-13 PROCEDURE — 73030 X-RAY EXAM OF SHOULDER: CPT

## 2023-04-13 PROCEDURE — 99283 EMERGENCY DEPT VISIT LOW MDM: CPT

## 2023-04-13 RX ORDER — LIDOCAINE 4 G/G
1 PATCH TOPICAL ONCE
Status: DISCONTINUED | OUTPATIENT
Start: 2023-04-13 | End: 2023-04-13 | Stop reason: HOSPADM

## 2023-04-13 RX ORDER — METHOCARBAMOL 500 MG/1
500 TABLET, FILM COATED ORAL 4 TIMES DAILY
Qty: 40 TABLET | Refills: 0 | Status: SHIPPED | OUTPATIENT
Start: 2023-04-13 | End: 2023-04-23

## 2023-04-13 RX ORDER — LIDOCAINE 50 MG/G
1 PATCH TOPICAL DAILY
Qty: 10 PATCH | Refills: 0 | Status: SHIPPED | OUTPATIENT
Start: 2023-04-13 | End: 2023-04-23

## 2023-04-13 RX ORDER — ACETAMINOPHEN 325 MG/1
650 TABLET ORAL ONCE
Status: COMPLETED | OUTPATIENT
Start: 2023-04-13 | End: 2023-04-13

## 2023-04-13 RX ADMIN — ACETAMINOPHEN 650 MG: 325 TABLET ORAL at 14:58

## 2023-04-13 RX ADMIN — IBUPROFEN 600 MG: 400 TABLET, FILM COATED ORAL at 14:58

## 2023-04-13 ASSESSMENT — PAIN DESCRIPTION - PAIN TYPE: TYPE: ACUTE PAIN

## 2023-04-13 ASSESSMENT — PAIN DESCRIPTION - DESCRIPTORS: DESCRIPTORS: PATIENT UNABLE TO DESCRIBE

## 2023-04-13 ASSESSMENT — ENCOUNTER SYMPTOMS
DIARRHEA: 0
NAUSEA: 0
CONSTIPATION: 0
BACK PAIN: 0
VOMITING: 0
RHINORRHEA: 0
ABDOMINAL PAIN: 0
SHORTNESS OF BREATH: 0
COUGH: 0
SORE THROAT: 0
EYE PAIN: 0

## 2023-04-13 ASSESSMENT — PAIN SCALES - GENERAL: PAINLEVEL_OUTOF10: 9

## 2023-04-13 ASSESSMENT — PAIN DESCRIPTION - ORIENTATION: ORIENTATION: LEFT

## 2023-04-13 ASSESSMENT — PAIN - FUNCTIONAL ASSESSMENT: PAIN_FUNCTIONAL_ASSESSMENT: 0-10

## 2023-04-13 ASSESSMENT — PAIN DESCRIPTION - LOCATION: LOCATION: SHOULDER

## 2023-04-13 NOTE — ED TRIAGE NOTES
Pt c/o upper left upper arm pain in his shoulder and shoulder blade after he fell two days ago and landed on left arm. Pt moves both arms freely.

## 2023-04-13 NOTE — ED PROVIDER NOTES
[address] [facility]        Pt Name: Jung Atkinson  MRN: [id number]  [place] 1960  Date of evaluation: 4/13/2023  Provider: MARGARITA Graham - CNP  PCP: Casey Rust MD  Note Started: 6:22 PM EDT 4/13/23      ABDELRAHMAN. I have evaluated this patient. My supervising physician was available for consultation. CHIEF COMPLAINT       Chief Complaint   Patient presents with    Arm Pain     Pt c/o upper left upper arm pain in his shoulder and shoulder blade after he fell two days ago and landed on left arm. Pt moves both arms freely. HISTORY OF PRESENT ILLNESS: 1 or more Elements     History From: Patient            Chief Complaint: Above    Jung Atkinson is a 63 y.o. male who presents to ED with left shoulder pain. Patient has a history of surgery to the same shoulder. He has had tendinitis on that side. 2 days ago he had a fall. He tripped and landed on concrete floor with left shoulder. No LOC. No other injuries. Nursing Notes were all reviewed and agreed with or any disagreements were addressed in the HPI. REVIEW OF SYSTEMS :      Review of Systems   Constitutional:  Negative for chills, diaphoresis and fever. HENT:  Negative for congestion, rhinorrhea and sore throat. Eyes:  Negative for pain and visual disturbance. Respiratory:  Negative for cough and shortness of breath. Cardiovascular:  Negative for chest pain and leg swelling. Gastrointestinal:  Negative for abdominal pain, constipation, diarrhea, nausea and vomiting. Genitourinary:  Negative for frequency and hematuria. Musculoskeletal:  Positive for arthralgias and myalgias. Negative for back pain and neck pain. Skin:  Negative for rash and wound. Neurological:  Negative for dizziness and light-headedness. Positives and Pertinent negatives as per HPI.      SURGICAL HISTORY     Past Surgical History:   Procedure Laterality Date    EXCISION

## 2023-04-13 NOTE — DISCHARGE INSTRUCTIONS
You have been prescribed medication, ROBAXIN, that causes drowsiness. While this is not a problem under normal circumstances, when taken with alcohol or while driving or operating heavy machinery, this medicine could cause you to become too sleepy and/or hurt yourself or others. Therefore, it is very important that you DO NOT DRIVE, DRINK ALCOHOL, OR OPERATE HEAVY MACHINERY WHILE TAKING THE MEDICINE(S) LISTED ABOVE.     Please follow up with your PC ,orthopedics and PT as we discussed  Return for any worsenign symptoms or new concerns

## 2023-04-21 ENCOUNTER — ANESTHESIA EVENT (OUTPATIENT)
Dept: ENDOSCOPY | Age: 63
End: 2023-04-21
Payer: COMMERCIAL

## 2023-04-23 ENCOUNTER — TELEPHONE (OUTPATIENT)
Dept: CASE MANAGEMENT | Age: 63
End: 2023-04-23

## 2023-04-24 ENCOUNTER — HOSPITAL ENCOUNTER (OUTPATIENT)
Age: 63
Setting detail: OUTPATIENT SURGERY
Discharge: HOME | End: 2023-04-24
Attending: INTERNAL MEDICINE | Admitting: INTERNAL MEDICINE
Payer: COMMERCIAL

## 2023-04-24 ENCOUNTER — ANESTHESIA (OUTPATIENT)
Dept: ENDOSCOPY | Age: 63
End: 2023-04-24
Payer: COMMERCIAL

## 2023-04-24 VITALS
SYSTOLIC BLOOD PRESSURE: 158 MMHG | RESPIRATION RATE: 18 BRPM | WEIGHT: 150 LBS | HEART RATE: 52 BPM | BODY MASS INDEX: 24.99 KG/M2 | TEMPERATURE: 96.9 F | DIASTOLIC BLOOD PRESSURE: 85 MMHG | OXYGEN SATURATION: 98 % | HEIGHT: 65 IN

## 2023-04-24 PROCEDURE — 3609027000 HC COLONOSCOPY: Performed by: INTERNAL MEDICINE

## 2023-04-24 PROCEDURE — 2500000003 HC RX 250 WO HCPCS: Performed by: NURSE ANESTHETIST, CERTIFIED REGISTERED

## 2023-04-24 PROCEDURE — 7100000001 HC PACU RECOVERY - ADDTL 15 MIN: Performed by: INTERNAL MEDICINE

## 2023-04-24 PROCEDURE — 2709999900 HC NON-CHARGEABLE SUPPLY: Performed by: INTERNAL MEDICINE

## 2023-04-24 PROCEDURE — 7100000011 HC PHASE II RECOVERY - ADDTL 15 MIN: Performed by: INTERNAL MEDICINE

## 2023-04-24 PROCEDURE — 3700000000 HC ANESTHESIA ATTENDED CARE: Performed by: INTERNAL MEDICINE

## 2023-04-24 PROCEDURE — 3700000001 HC ADD 15 MINUTES (ANESTHESIA): Performed by: INTERNAL MEDICINE

## 2023-04-24 PROCEDURE — 7100000000 HC PACU RECOVERY - FIRST 15 MIN: Performed by: INTERNAL MEDICINE

## 2023-04-24 PROCEDURE — 7100000010 HC PHASE II RECOVERY - FIRST 15 MIN: Performed by: INTERNAL MEDICINE

## 2023-04-24 PROCEDURE — 2580000003 HC RX 258: Performed by: NURSE ANESTHETIST, CERTIFIED REGISTERED

## 2023-04-24 PROCEDURE — 6360000002 HC RX W HCPCS: Performed by: NURSE ANESTHETIST, CERTIFIED REGISTERED

## 2023-04-24 RX ORDER — SODIUM CHLORIDE 0.9 % (FLUSH) 0.9 %
5-40 SYRINGE (ML) INJECTION PRN
Status: DISCONTINUED | OUTPATIENT
Start: 2023-04-24 | End: 2023-04-24 | Stop reason: HOSPADM

## 2023-04-24 RX ORDER — SODIUM CHLORIDE 9 MG/ML
INJECTION, SOLUTION INTRAVENOUS PRN
Status: DISCONTINUED | OUTPATIENT
Start: 2023-04-24 | End: 2023-04-24 | Stop reason: HOSPADM

## 2023-04-24 RX ORDER — SODIUM CHLORIDE 0.9 % (FLUSH) 0.9 %
5-40 SYRINGE (ML) INJECTION EVERY 12 HOURS SCHEDULED
Status: DISCONTINUED | OUTPATIENT
Start: 2023-04-24 | End: 2023-04-24 | Stop reason: HOSPADM

## 2023-04-24 RX ORDER — DIPHENHYDRAMINE HYDROCHLORIDE 50 MG/ML
12.5 INJECTION INTRAMUSCULAR; INTRAVENOUS
Status: DISCONTINUED | OUTPATIENT
Start: 2023-04-24 | End: 2023-04-24 | Stop reason: HOSPADM

## 2023-04-24 RX ORDER — ONDANSETRON 2 MG/ML
4 INJECTION INTRAMUSCULAR; INTRAVENOUS
Status: DISCONTINUED | OUTPATIENT
Start: 2023-04-24 | End: 2023-04-24 | Stop reason: HOSPADM

## 2023-04-24 RX ORDER — SODIUM CHLORIDE 9 MG/ML
INJECTION, SOLUTION INTRAVENOUS CONTINUOUS PRN
Status: DISCONTINUED | OUTPATIENT
Start: 2023-04-24 | End: 2023-04-24 | Stop reason: SDUPTHER

## 2023-04-24 RX ORDER — LIDOCAINE HYDROCHLORIDE 20 MG/ML
INJECTION, SOLUTION EPIDURAL; INFILTRATION; INTRACAUDAL; PERINEURAL PRN
Status: DISCONTINUED | OUTPATIENT
Start: 2023-04-24 | End: 2023-04-24 | Stop reason: SDUPTHER

## 2023-04-24 RX ORDER — PROPOFOL 10 MG/ML
INJECTION, EMULSION INTRAVENOUS PRN
Status: DISCONTINUED | OUTPATIENT
Start: 2023-04-24 | End: 2023-04-24 | Stop reason: SDUPTHER

## 2023-04-24 RX ORDER — PROPOFOL 10 MG/ML
INJECTION, EMULSION INTRAVENOUS CONTINUOUS PRN
Status: DISCONTINUED | OUTPATIENT
Start: 2023-04-24 | End: 2023-04-24 | Stop reason: SDUPTHER

## 2023-04-24 RX ADMIN — SODIUM CHLORIDE: 9 INJECTION, SOLUTION INTRAVENOUS at 09:40

## 2023-04-24 RX ADMIN — PROPOFOL 80 MG: 10 INJECTION, EMULSION INTRAVENOUS at 09:45

## 2023-04-24 RX ADMIN — LIDOCAINE HYDROCHLORIDE 80 MG: 20 INJECTION, SOLUTION EPIDURAL; INFILTRATION; INTRACAUDAL; PERINEURAL at 09:45

## 2023-04-24 RX ADMIN — PROPOFOL 180 MCG/KG/MIN: 10 INJECTION, EMULSION INTRAVENOUS at 09:45

## 2023-04-24 ASSESSMENT — LIFESTYLE VARIABLES: SMOKING_STATUS: 1

## 2023-04-24 ASSESSMENT — PAIN SCALES - GENERAL: PAINLEVEL_OUTOF10: 0

## 2023-04-24 ASSESSMENT — PAIN - FUNCTIONAL ASSESSMENT: PAIN_FUNCTIONAL_ASSESSMENT: 0-10

## 2023-04-24 NOTE — ANESTHESIA PRE PROCEDURE
Department of Anesthesiology  Preprocedure Note       Name:  Brian Menendez   Age:  63 y.o.  :  1960                                          MRN:  [id number]         Date:  2023      Surgeon: Michael Blas):  Torsten Giraldo MD    Procedure: Procedure(s):  COLONOSCOPY    Medications prior to admission:   Prior to Admission medications    Medication Sig Start Date End Date Taking? Authorizing Provider   Jermaine Blank 18 MCG inhalation capsule INHALE 1 CAPSULE INTO THE LUNGS DAILY. 23   Auzl Song MD   amLODIPine (NORVASC) 10 MG tablet TAKE 1 TABLET BY MOUTH EVERY DAY 23   Azul Song MD   hydroCHLOROthiazide (HYDRODIURIL) 25 MG tablet TAKE 1 TABLET BY MOUTH EVERY DAY IN THE MORNING 23   Azul Song MD   Cholecalciferol (VITAMIN D3) 50 MCG (2000 UT) CAPS TAKE 1 CAPSULE BY MOUTH EVERY DAY 23   Azul Song MD   diclofenac (VOLTAREN) 75 MG EC tablet TAKE 1 TABLET BY MOUTH TWICE A DAY AS NEEDED FOR PAIN 23   Azul Song MD   labetalol (NORMODYNE) 300 MG tablet TAKE 1 TABLET BY MOUTH EVERY DAY 23   Azul Song MD   tadalafil (CIALIS) 5 MG tablet Take 1 tablet by mouth daily as needed for Erectile Dysfunction Take one tablet daily as needed for erectile dysfunction.  3/9/23   Azul Song MD   aspirin (ASPIRIN LOW DOSE) 81 MG chewable tablet TAKE 1 TABLET BY MOUTH EVERY DAY 23   Azul Song MD   nicotine (NICODERM CQ) 14 MG/24HR Place 1 patch onto the skin daily for 14 days 9/29/22 10/13/22  Azul Song MD   albuterol sulfate HFA (VENTOLIN HFA) 108 (90 Base) MCG/ACT inhaler Inhale 2 puffs into the lungs every 6 hours as needed for Wheezing or Shortness of Breath 22   Azul Song MD   meloxicam [facility]) 7.5 MG tablet Take 1 tablet by mouth daily as needed for Pain 9/7/21 12/10/21  Azul Song MD   acetaminophen (TYLENOL) 325 MG tablet Take 2 tablets by mouth as needed 16   Historical Provider, MD

## 2023-04-24 NOTE — ANESTHESIA POSTPROCEDURE EVALUATION
Department of Anesthesiology  Postprocedure Note    Patient: Wily Zhu  MRN: [id number]  YOB: 1960  Date of evaluation: 4/24/2023      Procedure Summary     Date: 04/24/23 Room / Location: [address] [place] [address]    Anesthesia Start: 0699 Anesthesia Stop: 1000    Procedure: COLONOSCOPY Diagnosis:       Colon cancer screening      (COLON CANCER SCREENING)    Surgeons: Gali Vela MD Responsible Provider: Shaji Duffy MD    Anesthesia Type: MAC ASA Status: 3          Anesthesia Type: No value filed.     Bernardo Phase I: Bernardo Score: 9    Bernardo Phase II:        Anesthesia Post Evaluation    Patient location during evaluation: bedside  Patient participation: complete - patient participated  Level of consciousness: awake and alert  Pain score: 0  Nausea & Vomiting: no nausea  Complications: no  Cardiovascular status: hemodynamically stable  Respiratory status: acceptable  Hydration status: stable

## 2023-04-24 NOTE — H&P
Pre-operative History and Physical    Patient: Brian Menendez  : 1960  Acct#:     Intended Procedure:  Colonoscopy    HISTORY OF PRESENT ILLNESS:  The patient is a 63 y.o. male  who presents for/due to Screening       Past Medical History:        Diagnosis Date    Aneurysm ([facility] [address]     Arthritis     Cerebral artery occlusion with cerebral infarction [facility])      [address]     Hypertension     Smokes cigarettes     Subdural hematoma ([facility] [address]      Past Surgical History:        Procedure Laterality Date    EXCISION LESION HAND / FINGER Left 3/28/2019    LEFT HAND MASS EXCISION [facility]    Left thenar eminence Mass    KNEE SURGERY      knee cap repair at age 17    MOUTH SURGERY      SHOULDER ARTHROSCOPY Left     at [facility]     Medications Prior to Admission:   Prior to Admission medications    Medication Sig Start Date End Date Taking? Authorizing Provider   Jermaine Dias 18 MCG inhalation capsule INHALE 1 CAPSULE INTO THE LUNGS DAILY. 23   Azul Song MD   amLODIPine (NORVASC) 10 MG tablet TAKE 1 TABLET BY MOUTH EVERY DAY 23   Azul Song MD   hydroCHLOROthiazide (HYDRODIURIL) 25 MG tablet TAKE 1 TABLET BY MOUTH EVERY DAY IN THE MORNING 23   Azul Song MD   Cholecalciferol (VITAMIN D3) 50 MCG (2000) CAPS TAKE 1 CAPSULE BY MOUTH EVERY DAY 23   Azul Song MD   diclofenac (VOLTAREN) 75 MG EC tablet TAKE 1 TABLET BY MOUTH TWICE A DAY AS NEEDED FOR PAIN 23   Azul Song MD   labetalol (NORMODYNE) 300 MG tablet TAKE 1 TABLET BY MOUTH EVERY DAY 23   Azul Song MD   tadalafil (CIALIS) 5 MG tablet Take 1 tablet by mouth daily as needed for Erectile Dysfunction Take one tablet daily as needed for erectile dysfunction.  3/9/23   Azul Song MD   aspirin (ASPIRIN LOW DOSE) 81 MG chewable tablet TAKE 1 TABLET BY MOUTH EVERY DAY 23   Azul Song MD   nicotine (Cammy Nares) 14

## 2023-04-24 NOTE — DISCHARGE INSTRUCTIONS
Recommendations:    Recommend repeat colonoscopy in 10 years for screening purposes. Discharge Instructions for Colonoscopy     Colonoscopy is a visual exam of the lining of the large intestine, also called the bowel or colon, with a colonoscope. A colonoscope is a flexible tube with a light and a viewing device. It allows the doctor to view the inside of the colon through a tiny video camera. Colonoscopy is performed for many reasons: unexplained anemia , pain, diarrhea , bloody stools, cancer screening, among many other reasons. Complications from a colonoscopy are rare. Some possible serious complications include perforated bowel (which might require surgery) and bleeding (which could require blood transfusion ). Minor complications include bloating, gas, and cramping that can last for 1-2 days after the procedure. Because air is put into your colon during the procedure, it is normal to pass large amounts of air from your rectum. You may not have a bowel movement for 1-3 days after the procedure. What You Will Need:  Someone to drive you home after the procedure     Steps to Take:  [address] when you get home. Because the sedative will make you drowsy, don't drive, operate machinery, or make important decisions the day of the procedure. Feelings of bloating, gas, or cramping may persist for 24 hours. Diet -  Try sips of water first. If tolerated, resume bland food (scrambled eggs, toast, soup) first.  If tolerated, resume regular diet or the diet recommended by your physician. Do not drink alcohol for 24 hours. Physical Activity -  Ask your doctor when you will be able to return to work. Do not drive, operate heavy machinery, or do activities that require coordination or balance for 24 hours. Otherwise, return to your normal routine as soon as you are comfortable to do so, which is usually the next day after the procedure.    Medications - When taking medications, it's

## 2023-04-24 NOTE — OP NOTE
Colonoscopy Procedure Note      Patient: Harrison Parsons  : 1960  Acct#:     Procedure: Colonoscopy    Date:  2023    Surgeon:  Nikko Mcdonald MD    Referring Physician:  Deni Ta MD    Previous Colonoscopy: NO  Date: N/A  Greater than 3 years: N/A    Preoperative Diagnosis:  1. Screening     Postoperative Diagnosis:  1. Normal Colonoscopy     Consent:  The patient or their legal guardian has signed a consent, and is aware of the potential risks, benefits, alternatives, and potential complications of this procedure. These include, but are not limited to hemorrhage, bleeding, post procedural pain, perforation, phlebitis, aspiration, hypotension, hypoxia, cardiovascular events such as arryhthmia, and possibly death. Additionally, the possibility of missed colonic polyps and interval colon cancer was discussed in the consent. Anesthesia:  The patient was administered IV propofol per anesthesiology team.  Please see their operative records for full details. Procedure: An informed consent was obtained from the patient after explanation of indications, benefits, possible risks and complications of the procedure. The patient was then taken to the endoscopy suite, placed in the left lateral decubitus position, and the above IV anesthesia was administered. A digital rectal examination was performed and revealed negative without mass, lesions or tenderness. The Olympus video colonoscope was placed in the patient's rectum under digital direction and advanced to the cecum. The cecum was identified by characteristic anatomy and ballottment. The preparation was good. The ileocecal valve was identified. The scope was then withdrawn back through the cecum, ascending, transverse, descending, sigmoid colon, and rectum. Careful circumferential examination of the mucosa in these areas demonstrated:     The entire colon was normal with no polyps, masses, inflammation, or other

## 2023-05-10 ENCOUNTER — HOSPITAL ENCOUNTER (EMERGENCY)
Age: 63
Discharge: HOME | End: 2023-05-10
Payer: COMMERCIAL

## 2023-05-10 VITALS
HEART RATE: 52 BPM | HEIGHT: 65 IN | OXYGEN SATURATION: 99 % | TEMPERATURE: 97.1 F | SYSTOLIC BLOOD PRESSURE: 134 MMHG | BODY MASS INDEX: 26.56 KG/M2 | WEIGHT: 159.39 LBS | RESPIRATION RATE: 16 BRPM | DIASTOLIC BLOOD PRESSURE: 77 MMHG

## 2023-05-10 DIAGNOSIS — M54.10 RADICULOPATHY, UNSPECIFIED SPINAL REGION: ICD-10-CM

## 2023-05-10 DIAGNOSIS — M25.512 LEFT SHOULDER PAIN, UNSPECIFIED CHRONICITY: Primary | ICD-10-CM

## 2023-05-10 DIAGNOSIS — Z72.0 TOBACCO ABUSE: ICD-10-CM

## 2023-05-10 PROCEDURE — 6370000000 HC RX 637 (ALT 250 FOR IP): Performed by: GENERAL ACUTE CARE HOSPITAL

## 2023-05-10 PROCEDURE — 99284 EMERGENCY DEPT VISIT MOD MDM: CPT

## 2023-05-10 PROCEDURE — 6360000002 HC RX W HCPCS: Performed by: GENERAL ACUTE CARE HOSPITAL

## 2023-05-10 PROCEDURE — 96372 THER/PROPH/DIAG INJ SC/IM: CPT

## 2023-05-10 RX ORDER — TRAMADOL HYDROCHLORIDE 50 MG/1
50 TABLET ORAL ONCE
Status: COMPLETED | OUTPATIENT
Start: 2023-05-10 | End: 2023-05-10

## 2023-05-10 RX ORDER — PREDNISONE 50 MG/1
50 TABLET ORAL DAILY
Qty: 4 TABLET | Refills: 0 | Status: SHIPPED | OUTPATIENT
Start: 2023-05-10 | End: 2023-05-14

## 2023-05-10 RX ORDER — CYCLOBENZAPRINE HCL 10 MG
10 TABLET ORAL 3 TIMES DAILY PRN
Qty: 20 TABLET | Refills: 0 | Status: SHIPPED | OUTPATIENT
Start: 2023-05-10 | End: 2023-05-17

## 2023-05-10 RX ORDER — DEXAMETHASONE SODIUM PHOSPHATE 4 MG/ML
10 INJECTION, SOLUTION INTRA-ARTICULAR; INTRALESIONAL; INTRAMUSCULAR; INTRAVENOUS; SOFT TISSUE ONCE
Status: COMPLETED | OUTPATIENT
Start: 2023-05-10 | End: 2023-05-10

## 2023-05-10 RX ORDER — ORPHENADRINE CITRATE 30 MG/ML
60 INJECTION INTRAMUSCULAR; INTRAVENOUS ONCE
Status: DISCONTINUED | OUTPATIENT
Start: 2023-05-10 | End: 2023-05-10

## 2023-05-10 RX ORDER — CYCLOBENZAPRINE HCL 10 MG
10 TABLET ORAL ONCE
Status: COMPLETED | OUTPATIENT
Start: 2023-05-10 | End: 2023-05-10

## 2023-05-10 RX ADMIN — DEXAMETHASONE SODIUM PHOSPHATE 10 MG: 4 INJECTION, SOLUTION INTRAMUSCULAR; INTRAVENOUS at 08:39

## 2023-05-10 RX ADMIN — TRAMADOL HYDROCHLORIDE 50 MG: 50 TABLET ORAL at 08:38

## 2023-05-10 RX ADMIN — CYCLOBENZAPRINE 10 MG: 10 TABLET, FILM COATED ORAL at 08:38

## 2023-05-10 ASSESSMENT — PAIN DESCRIPTION - ORIENTATION: ORIENTATION: LEFT

## 2023-05-10 ASSESSMENT — ENCOUNTER SYMPTOMS
SORE THROAT: 0
CHEST TIGHTNESS: 0
ABDOMINAL PAIN: 0
SHORTNESS OF BREATH: 0
VOMITING: 0
BACK PAIN: 0
VOICE CHANGE: 0
COUGH: 0
WHEEZING: 0
NAUSEA: 0

## 2023-05-10 ASSESSMENT — LIFESTYLE VARIABLES
HOW OFTEN DO YOU HAVE A DRINK CONTAINING ALCOHOL: NEVER
HOW MANY STANDARD DRINKS CONTAINING ALCOHOL DO YOU HAVE ON A TYPICAL DAY: PATIENT DOES NOT DRINK

## 2023-05-10 ASSESSMENT — PAIN DESCRIPTION - LOCATION: LOCATION: SHOULDER

## 2023-05-10 ASSESSMENT — PAIN SCALES - GENERAL: PAINLEVEL_OUTOF10: 10

## 2023-05-10 NOTE — ED PROVIDER NOTES
[address] [facility]        Pt Name: Andrés Chaudhari  MRN: [id number]  [place] 1960  Date of evaluation: 5/10/2023  Provider: MARGARITA Tijerina - FRANCISCO  PCP: Jigna Pritchett MD  Note Started: 8:20 AM EDT 5/10/23      ABDELRAHMAN. I have evaluated this patient. My supervising physician was available for consultation. CHIEF COMPLAINT       Chief Complaint   Patient presents with    Shoulder Pain     Left shoulder pain x 1 month. Seen here previously for this issue. Pt fell and injured this shoulder 1 month ago. Is mobile but painful to do so. Left shoulder surgery when 18. HISTORY OF PRESENT ILLNESS: 1 or more Elements     History From: Patient       Andrés Chaudhari is a 63 y.o. male who presents to the emergency department today reporting approximate 1 month history of left shoulder pain. Patient states that he was seen in this ED initially after the incident occurred. He reports sustaining a mechanical fall. Patient states that he did not know that he was supposed to follow-up. Patient is right-hand dominant. He denies new injury. Patient states that he does do a significant amount of lifting and repetitive motions at work. He currently ports a pain level of 7 out of 10. He describes his pain as constant, dull, and aching. The pain radiates down the left arm at times. He has not taken anything for the symptoms. He denies recent fever, chills, or other symptoms. Nursing Notes were all reviewed and agreed with or any disagreements were addressed in the HPI. REVIEW OF SYSTEMS :      Review of Systems   Constitutional:  Negative for chills, fatigue and fever. HENT:  Negative for congestion, sore throat and voice change. Eyes:  Negative for visual disturbance. Respiratory:  Negative for cough, chest tightness, shortness of breath and wheezing. Cardiovascular:  Negative for chest pain and palpitations.

## 2023-05-10 NOTE — ED NOTES
Discharged instructions reviewed,patient verbalized understanding, Patient discharged to home walked with steady gait,Rudolph MARTINS LPN  18/81/79 2903

## 2023-05-10 NOTE — DISCHARGE INSTRUCTIONS
Follow-up with listed orthopedic physician. Take prednisone and Flexeril as directed. Apply ice for 20 minutes every 4 hours.

## 2023-05-12 ENCOUNTER — OFFICE VISIT (OUTPATIENT)
Dept: ORTHOPEDIC SURGERY | Age: 63
End: 2023-05-12

## 2023-05-12 VITALS — BODY MASS INDEX: 26.49 KG/M2 | HEIGHT: 65 IN | WEIGHT: 159 LBS

## 2023-05-12 DIAGNOSIS — S46.912A STRAIN OF LEFT SHOULDER, INITIAL ENCOUNTER: Primary | ICD-10-CM

## 2023-05-12 DIAGNOSIS — M25.512 LEFT SHOULDER PAIN, UNSPECIFIED CHRONICITY: ICD-10-CM

## 2023-05-12 DIAGNOSIS — F17.200 CURRENT SMOKER: ICD-10-CM

## 2023-05-12 RX ORDER — NAPROXEN 500 MG/1
500 TABLET ORAL 2 TIMES DAILY WITH MEALS
Qty: 60 TABLET | Refills: 0 | Status: SHIPPED | OUTPATIENT
Start: 2023-05-12 | End: 2023-06-11

## 2023-05-12 NOTE — PROGRESS NOTES
CHIEF COMPLAINT: Left shoulder pain/ contusion/ cuff strain. HISTORY:  Mr. Amandeep Harding 63 y.o. male right handed presents today for the first visit for evaluation of left shoulder pain which started April 2023 after a fall. He is complaining of sharp pain, 7/10. Pain is increase with elevation and decrease with rest. No radiation and no numbness and tingling sensation. No other complaint. No h/o gout. Past Medical History:   Diagnosis Date    Aneurysm ([address]     Arthritis     Cerebral artery occlusion with cerebral infarction [facility])     [address] 2016    Hypertension     Smokes cigarettes     Subdural hematoma [facility])        Past Surgical History:   Procedure Laterality Date    COLONOSCOPY N/A 4/24/2023    COLONOSCOPY performed by Veto Us MD at [address] [place] [address] St / FINGER Left 3/28/2019    LEFT HAND MASS EXCISION performed by Qasim Santos MD at [facility] Left 03/28/2019    Left thenar eminence Mass    KNEE SURGERY      knee cap repair at age 17    MOUTH SURGERY      SHOULDER ARTHROSCOPY Left     at [address] History     Socioeconomic History    Marital status: Single     Spouse name: Not on file    Number of children: 9    Years of education: Not on file    Highest education level: Not on file   Occupational History    Occupation: CONSTRUCTION    Occupation: Maintenance     Employer: Did not disclose   Tobacco Use    Smoking status: Every Day     Packs/day: 0.25     Years: 40.00     Pack years: 10.00     Types: Cigarettes     Start date: 1982    Smokeless tobacco: Never    Tobacco comments:     Taking Chantix   Vaping Use    Vaping Use: Never used   Substance and Sexual Activity    Alcohol use: No    Drug use: No    Sexual activity: Yes     Partners: Female     Comment: SINGLE   Other Topics Concern    Not on file   Social History Narrative    Live with girlfriend.       Social Determinants of Health     Financial Resource Strain: Low Risk     Difficulty of

## 2023-05-17 DIAGNOSIS — N52.8 OTHER MALE ERECTILE DYSFUNCTION: ICD-10-CM

## 2023-05-17 RX ORDER — TADALAFIL 5 MG/1
TABLET ORAL
Qty: 30 TABLET | Refills: 2 | Status: SHIPPED | OUTPATIENT
Start: 2023-05-17

## 2023-05-25 NOTE — PLAN OF CARE
[address] [facility]  Phone: [phone]   Fax:     [phone]                                                        Physical Therapy Certification    Dear Jovanni Bradford MD,    We had the pleasure of evaluating the following patient for physical therapy services at [facility]. A summary of our findings can be found in the initial assessment below. This includes our plan of care. If you have any questions or concerns regarding these findings, please do not hesitate to contact me at the office phone number checked above.   Thank you for the referral.       Physician Signature:_______________________________Date:__________________  By signing above (or electronic signature), therapists plan is approved by physician      Patient: Khushboo Perez   : 1960   MRN: [id number]  Referring Physician: Jovanni Bradford MD      Evaluation Date: 2023      Medical Diagnosis Information:  Medical Diagnosis: Left shoulder pain, unspecified chronicity [M25.512]  Strain of left shoulder, initial encounter [B35.706T]   Treatment Diagnosis: left shoulder and cervical movement dysfunction, decreased arom, decreased strength                                Insurance information: PT Insurance Information: [employer]    Precautions/ Contra-indications:   Latex Allergy:   [x]  NO      []YES  Preferred Language for Healthcare:   [x]English       []other:    C-SSRS Triggered by Intake questionnaire (Past 2 wk assessment ):   [x] No, Questionnaire did not trigger screening.   [] Yes, Patient intake triggered C-SSRS Screening      [] C-SSRS Screening completed  [] PCP notified via Epic     SUBJECTIVE: Onset of symptoms:  2023 post fall   , To date symptoms have been treated with:  nothing  , Reported complaints of:  constant left shoulder and upper arm pain,

## 2023-05-26 ENCOUNTER — HOSPITAL ENCOUNTER (OUTPATIENT)
Dept: PHYSICAL THERAPY | Age: 63
Setting detail: THERAPIES SERIES
Discharge: HOME | End: 2023-05-26
Payer: COMMERCIAL

## 2023-05-26 PROCEDURE — 97161 PT EVAL LOW COMPLEX 20 MIN: CPT

## 2023-05-26 PROCEDURE — 97530 THERAPEUTIC ACTIVITIES: CPT

## 2023-05-26 NOTE — FLOWSHEET NOTE
coordination, kinesthetic sense, posture, motor skill, proprioception and motor activation to allow for proper function of scapular, scapulothoracic and UE control with self care, carrying, lifting, driving/computer work. Home Exercise Program:    [x] (75955) Reviewed/Progressed HEP activities related to strengthening, flexibility, endurance, ROM of scapular, scapulothoracic and UE control with self care, reaching, carrying, lifting, house/yardwork, driving/computer work  [x] (76494) Reviewed/Progressed HEP activities related to improving balance, coordination, kinesthetic sense, posture, motor skill, proprioception of scapular, scapulothoracic and UE control with self care, reaching, carrying, lifting, house/yardwork, driving/computer work      Manual Treatments:  PROM / STM / Oscillations-Mobs:  G-I, II, III, IV (PA's, Inf., Post.)  [x] (16092) Provided manual therapy to mobilize soft tissue/joints of cervical/CT, scapular GHJ and UE for the purpose of modulating pain, promoting relaxation,  increasing ROM, reducing/eliminating soft tissue swelling/inflammation/restriction, improving soft tissue extensibility and allowing for proper ROM for normal function with self care, reaching, carrying, lifting, house/yardwork, driving/computer work      Modalities:  [] (78357) Vasopneumatic compression: Utilized vasopneumatic compression to decrease edema / swelling for the purpose of improving mobility and quad tone / recruitment which will allow for increased overall function including but not limited to self-care, dressing, driving and transfers.      maria isabel:  Timed Code Treatment Minutes: 35   Total Treatment Minutes: 35       [x] EVAL (LOW) 41470 (typically 20 minutes face-to-face)  [] EVAL (MOD) 46230 (typically 30 minutes face-to-face)  [] EVAL (HIGH) 87054 (typically 45 minutes face-to-face)  [] RE-EVAL     [x] GJ(86649) x     [] Dry needle 1 or 2 Muscles (03301)  [] NMR (20859) x     [] Dry needle 3+ Muscles
No

## 2023-05-29 ENCOUNTER — TELEPHONE (OUTPATIENT)
Dept: CASE MANAGEMENT | Age: 63
End: 2023-05-29

## 2023-05-29 NOTE — TELEPHONE ENCOUNTER
Certified Lung Cancer Screening Reminder Recommendation letter mailed to patient, this is patients 3rd & final reminder letter. Patients ordering provider, Dr. Lorrie Paulson, notified via EMR direct message, verification received. Most recent smoking history reviewed and ALA smoking cessation [address] [facility] [address] class information mailed to patient.

## 2023-05-31 ENCOUNTER — HOSPITAL ENCOUNTER (OUTPATIENT)
Dept: PHYSICAL THERAPY | Age: 63
Setting detail: THERAPIES SERIES
Discharge: HOME | End: 2023-05-31
Payer: COMMERCIAL

## 2023-05-31 PROCEDURE — 97112 NEUROMUSCULAR REEDUCATION: CPT

## 2023-05-31 PROCEDURE — 97140 MANUAL THERAPY 1/> REGIONS: CPT

## 2023-05-31 NOTE — FLOWSHEET NOTE
[address] [facility] [place]  Phone: [phone]   Fax:     [phone]        Physical Therapy Treatment Note/ Progress Report:       Date:  2023    Patient Name:  Fabian Trinidad    :  1960  MRN: [id number]    Pertinent Medical History:     Medical/Treatment Diagnosis Information:  Medical Diagnosis: Left shoulder pain, unspecified chronicity [M25.512]  Strain of left shoulder, initial encounter [S46.566I]  Treatment Diagnosis: left shoulder and cervical movement dysfunction, decreased arom, decreased strength    Insurance/Certification information:  PT Insurance Information: [address]  Physician Information:  Nilesh Reid MD  Plan of care signed (Y/N): routed     Date of Patient follow up with Physician:      Progress Report: []  Yes  [x]  No     Date Range for reporting period:  Beginnin2023  Ending:      Progress report due (10 Rx/or 30 days whichever is less):      Recertification due (POC duration/ or 90 days whichever is less):      Visit # POC/Insurance Allowable Auth Needed   2 /  [address] [x]Yes    []No     Functional Outcomes Measure:    Test: UEFI  Date Assessed Score    19           Pain level:  7/10     History of Injury: Onset of symptoms:  2023 post fall   , To date symptoms have been treated with:  nothing  , Reported complaints of:  constant left shoulder and upper arm pain, denies radiation below elbow  ,Provocative factors:  elevation, reaching, lifting, getting dressed  , Easing factors:    rest , Paresthesias:   denies  , Sleep: is disturbed some nights   , Occupation/School:  property maintenance  , Activities: include nothing , He is right handed      Imaging: from  23 visit with Dr Melanie Huynh were taken in the office today, 3 views of the left shoulder, and showed no acute fracture.       SUBJECTIVE:  15 minutes late

## 2023-06-02 ENCOUNTER — HOSPITAL ENCOUNTER (OUTPATIENT)
Dept: PHYSICAL THERAPY | Age: 63
Setting detail: THERAPIES SERIES
Discharge: HOME | End: 2023-06-02
Payer: COMMERCIAL

## 2023-06-02 PROCEDURE — 97140 MANUAL THERAPY 1/> REGIONS: CPT

## 2023-06-02 PROCEDURE — 97112 NEUROMUSCULAR REEDUCATION: CPT

## 2023-06-02 NOTE — FLOWSHEET NOTE
[address] [facility] [place]  Phone: [phone]   Fax:     [phone]        Physical Therapy Treatment Note/ Progress Report:       Date:  2023    Patient Name:  Olena Hussein    :  1960  MRN: [id number]    Pertinent Medical History:     Medical/Treatment Diagnosis Information:  Medical Diagnosis: Left shoulder pain, unspecified chronicity [M25.512]  Strain of left shoulder, initial encounter [S43.018Z]  Treatment Diagnosis: left shoulder and cervical movement dysfunction, decreased arom, decreased strength    Insurance/Certification information:  PT Insurance Information: [employer] [id number]  Physician Information:  Celeste Osorio MD  Plan of care signed (Y/N): routed     Date of Patient follow up with Physician:      Progress Report: []  Yes  [x]  No     Date Range for reporting period:  Beginnin2023  Ending:      Progress report due (10 Rx/or 30 days whichever is less):      Recertification due (POC duration/ or 90 days whichever is less):      Visit # POC/Insurance Allowable Auth Needed   3 /  [address] [x]Yes    []No     Functional Outcomes Measure:    Test: UEFI  Date Assessed Score    19           Pain level:  5-6/10     History of Injury: Onset of symptoms:  2023 post fall   , To date symptoms have been treated with:  nothing  , Reported complaints of:  constant left shoulder and upper arm pain, denies radiation below elbow  ,Provocative factors:  elevation, reaching, lifting, getting dressed  , Easing factors:    rest , Paresthesias:   denies  , Sleep: is disturbed some nights   , Occupation/School:  property maintenance  , Activities: include nothing , He is right handed      Imaging: from  23 visit with Dr Riddhi Lynn were taken in the office today, 3 views of the left shoulder, and showed no acute fracture.       SUBJECTIVE:  15 minutes

## 2023-06-06 ENCOUNTER — HOSPITAL ENCOUNTER (OUTPATIENT)
Dept: PHYSICAL THERAPY | Age: 63
Setting detail: THERAPIES SERIES
Discharge: HOME | End: 2023-06-06
Payer: COMMERCIAL

## 2023-06-06 PROCEDURE — 97140 MANUAL THERAPY 1/> REGIONS: CPT

## 2023-06-06 PROCEDURE — G0283 ELEC STIM OTHER THAN WOUND: HCPCS

## 2023-06-06 NOTE — FLOWSHEET NOTE
[address] [facility] [address]  [place] [address]  Phone: [phone]   Fax:     [phone]        Physical Therapy Treatment Note/ Progress Report:       Date:  2023    Patient Name:  Alvaro Talbot    :  1960  MRN: [id number]    Pertinent Medical History:     Medical/Treatment Diagnosis Information:  Medical Diagnosis: Left shoulder pain, unspecified chronicity [M25.512]  Strain of left shoulder, initial encounter [S46.244W]  Treatment Diagnosis: left shoulder and cervical movement dysfunction, decreased arom, decreased strength    Insurance/Certification information:  PT Insurance Information: [address]  Physician Information:  Annia Jameson MD  Plan of care signed (Y/N): routed     Date of Patient follow up with Physician:      Progress Report: []  Yes  [x]  No     Date Range for reporting period:  Beginnin2023  Ending:      Progress report due (10 Rx/or 30 days whichever is less): 3/66     Recertification due (POC duration/ or 90 days whichever is less):      Visit # POC/Insurance Allowable Auth Needed   [address] [place] [address] visits  to 23  codes [phone], [id number], [id number], 53075, 84617 [x]Yes    []No     Functional Outcomes Measure:    Test: UEFI  Date Assessed Score    19           Pain level:  7-8/10     History of Injury: Onset of symptoms:  2023 post fall   , To date symptoms have been treated with:  nothing  , Reported complaints of:  constant left shoulder and upper arm pain, denies radiation below elbow  ,Provocative factors:  elevation, reaching, lifting, getting dressed  , Easing factors:    rest , Paresthesias:   denies  , Sleep: is disturbed some nights   , Occupation/School:  property maintenance  , Activities: include nothing , He is right handed      Imaging: from  23 visit with Dr Mckinney Stage were taken in the office today, 3 views of the

## 2023-06-08 ENCOUNTER — HOSPITAL ENCOUNTER (OUTPATIENT)
Dept: PHYSICAL THERAPY | Age: 63
Setting detail: THERAPIES SERIES
Discharge: HOME | End: 2023-06-08
Payer: COMMERCIAL

## 2023-06-08 PROCEDURE — 97140 MANUAL THERAPY 1/> REGIONS: CPT

## 2023-06-08 PROCEDURE — 97112 NEUROMUSCULAR REEDUCATION: CPT

## 2023-06-08 NOTE — FLOWSHEET NOTE
Provided verbal/tactile cueing for activities related to strengthening, flexibility, endurance, ROM  for improvements in scapular, scapulothoracic and UE control with self care, reaching, carrying, lifting, house/yardwork, driving/computer work. [x] (32786) Provided verbal/tactile cueing for activities related to improving balance, coordination, kinesthetic sense, posture, motor skill, proprioception  to assist with  scapular, scapulothoracic and UE control with self care, reaching, carrying, lifting, house/yardwork, driving/computer work. Therapeutic Activities:    [x] (88689 or 73543) Provided verbal/tactile cueing for activities related to improving balance, coordination, kinesthetic sense, posture, motor skill, proprioception and motor activation to allow for proper function of scapular, scapulothoracic and UE control with self care, carrying, lifting, driving/computer work.      Home Exercise Program:    [x] (32911) Reviewed/Progressed HEP activities related to strengthening, flexibility, endurance, ROM of scapular, scapulothoracic and UE control with self care, reaching, carrying, lifting, house/yardwork, driving/computer work  [x] (80169) Reviewed/Progressed HEP activities related to improving balance, coordination, kinesthetic sense, posture, motor skill, proprioception of scapular, scapulothoracic and UE control with self care, reaching, carrying, lifting, house/yardwork, driving/computer work      Manual Treatments:  PROM / STM / Oscillations-Mobs:  G-I, II, III, IV (PA's, Inf., Post.)  [x] (00734) Provided manual therapy to mobilize soft tissue/joints of cervical/CT, scapular GHJ and UE for the purpose of modulating pain, promoting relaxation,  increasing ROM, reducing/eliminating soft tissue swelling/inflammation/restriction, improving soft tissue extensibility and allowing for proper ROM for normal function with self care, reaching, carrying, lifting, house/yardwork, driving/computer

## 2023-06-15 NOTE — PROGRESS NOTES
Medication name: insulin glargine (LANTUS SOLOSTAR) 100 UNIT/ML pen-injector   Last Refill Details: Date 08/11/2020, Quantity:  15 mL, refills 3  Ordering provider name: Missy Villa MD  Last office visit: 2/22/2023 with provider Missy Villa MD     Unable to refill medication per established guidelines, request forwarded to provider for review.  Caller advised to contact office for follow-up.  Unable to refill as last refill was over 1 year ago. Thank you.        ORTHOPAEDIC CONSULTATION NOTE    Chief Complaint   Patient presents with    Knee Pain     left knee       HPI  4/21/22  63 y.o. male seen in consultation at the request of Marc Jones MD for evaluation of left knee pain:  Onset chronic/years  Injury/trauma none  History of symptoms - history of left knee open OCD repair when he was 14to 18years old  Pain is located anteromedial  Worse with activity, prolonged WB, ROM  Better with rest  Associated with swelling    Works in [employer] half a pack per day      Review of Systems  I have read over the ROS from the Patient History Form dated on 3/21/22  Pertinent positives include peripheral edema, stroke  Rest of 13 point ROS otherwise negative except per HPI, and scanned into the patient's chart under the Media tab. Allergies   Allergen Reactions    Pcn [Penicillins] Anaphylaxis and Swelling        Current Outpatient Medications   Medication Sig Dispense Refill    diclofenac (VOLTAREN) 75 MG EC tablet TAKE 1 TABLET BY MOUTH TWICE A DAY AS NEEDED FOR PAIN 60 tablet 0    [facility] D3 50 MCG (2000 UT) CAPS TAKE 1 CAPSULE BY MOUTH EVERY DAY 30 capsule 2    amLODIPine (NORVASC) 10 MG tablet TAKE 1 TABLET BY MOUTH EVERY DAY 90 tablet 0    hydroCHLOROthiazide (HYDRODIURIL) 25 MG tablet Take 1 tablet by mouth every morning 90 tablet 0    labetalol (NORMODYNE) 300 MG tablet Take 1 tablet by mouth daily 90 tablet 0    tiotropium (SPIRIVA HANDIHALER) 18 MCG inhalation capsule Inhale 1 capsule into the lungs daily 90 capsule 0    [facility]S ASPIRIN ADULT LOW DOSE 81 MG chewable tablet TAKE 1 TABLET BY MOUTH EVERY DAY 30 tablet 3    tadalafil (CIALIS) 5 MG tablet Take 1 tablet by mouth daily as needed for Erectile Dysfunction Take one tablet daily as needed for erectile dysfunction.  30 tablet 2    albuterol sulfate HFA (VENTOLIN HFA) 108 (90 Base) MCG/ACT inhaler Inhale 2 puffs into the lungs every 6 hours as needed for Wheezing or Shortness of Breath 1 each 1    varenicline (CHANTIX CONTINUING MONTH ROSARIO) 1 MG tablet Take 1 tablet by mouth 2 times daily 60 tablet 1    acetaminophen (TYLENOL) 325 MG tablet Take 650 mg by mouth as needed       No current facility-administered medications for this visit. Past Medical History:   Diagnosis Date    Aneurysm ([address]     Arthritis     Cerebral artery occlusion with cerebral infarction [facility])     [address] 2016    Hypertension     Smokes cigarettes     Subdural hematoma (HCC)         Past Surgical History:   Procedure Laterality Date    EXCISION LESION HAND / FINGER Left 3/28/2019    LEFT HAND MASS EXCISION performed by Lio Thibodeaux [facility] [place] Left 03/28/2019    Left thenar eminence Mass    KNEE SURGERY      knee cap repair at age 18    [de-identified] SURGERY      SH[facility]ER [facility]ROSCOPY [facility]     at [facility]       Family History   Problem Relation Age of Onset    High Blood Pressure Mother     Diabetes Mother     Other Father         SEIZURES    High Blood Pressure Father     Cancer Maternal Aunt         ? TYPE       Social History     Socioeconomic History    Marital status: Single     Spouse name: Not on file    Number of children: 5    Years of education: Not on file    Highest education level: Not on file   Occupational History    Occupation: CONSTRUCTION    Occupation: Maintenance     Employer: Did not disclose   Tobacco Use    Smoking status: Current Every Day Smoker     Packs/day: 1.00     Years: 40.00     Pack years: 40.00     Types: Cigarettes     Start date: 1982    Smokeless tobacco: Never Used    Tobacco comment: Taking Chantix   Vaping Use    Vaping Use: Never used   Substance and Sexual Activity    Alcohol use: No    Drug use: No    Sexual activity: Yes     Partners: Female     Comment: SINGLE   Other Topics Concern    Not on file   Social History Narrative    Live with girlfriend.       Social Determinants of Health     Financial Resource Strain:     Difficulty of Paying Living Expenses: Not on file   Food Insecurity:     Worried About Running Out of Food in the Last Year: Not on file    Peter of Food in the Last Year: Not on file   Transportation Needs:     Lack of Transportation (Medical): Not on file    Lack of Transportation (Non-Medical): Not on file   Physical Activity:     Days of Exercise per Week: Not on file    Minutes of Exercise per Session: Not on file   Stress:     Feeling of Stress : Not on file   Social Connections:     Frequency of Communication with Friends and Family: Not on file    Frequency of Social Gatherings with Friends and Family: Not on file    Attends [employer] Services: Not on file    Active Member of [address] [place] [address] [employer] or Organizations: Not on file    Attends Club or Organization Meetings: Not on file    Marital Status: Not on file   Intimate Partner Violence:     Fear of Current or Ex-Partner: Not on file    Emotionally Abused: Not on file    Physically Abused: Not on file    Sexually Abused: Not on file   Housing Stability:     Unable to Pay for Housing in the Last Year: Not on file    Number of [place] in the Last Year: Not on file    Unstable Housing in the Last Year: Not on file        Vitals:    04/21/22 1445   BP: (!) 180/95   Site: Right Upper Arm   Position: Sitting   Cuff Size: Small Adult   Pulse: 63   Resp: 16   Weight: 160 lb (72.6 kg)   Height: 5' 5\" (1.651 m)       Physical Exam  Constitutional  well-groomed, well-nourished, Body mass index is 26.63 kg/m². Tobacco odor  Psychiatric  pleasant, normal mood & affect  Cardiovascular  RRR, negative peripheral edema, dorsalis pedis pulse 2+  Respiratory  respirations unlabored, on room air; mask on  Skin  no rashes, wounds, or lesions seen on exposed skin  Spine  no radicular pain with SLR.      Neurological - LLE SILT SP/DP/T/sural/saphenous nerve distributions; EHL/FHL/TA/GS intact  Left knee:   neutral alignment    effusion noted   atrophy of the quadriceps    Moderate tenderness to palpation medial joint line   Mild tenderness to palpation lateral joint line    Prior anteromedial surgical incision nicely healed   Range of Motion:    Extension:  10    Flexion:  106   Special tests:    crepitus with ROM    positive patellar grind   Ligamentous testing:    Varus stress stable    Valgus stress stable      Imaging:  Images were personally reviewed by myself and discussed with the patient  Prior knee radiographs were reviewed  Advanced tricompartmental knee arthritis, worse in the medial compartment  2 metallic pins in the medial femoral condyle, likely related to his prior open OCD repair   1 appears somewhat proud  Lateral compartment chondrocalcinosis is seen  No fractures or dislocation      Assessment & Plan:  63 y.o. male who presents with    Diagnosis Orders   1. Arthritis of left knee  [address]   2. Exposed orthopaedic hardware [facility])  [address]   3. Chondrocalcinosis     4. Tobacco abuse  [address]       No orders of the defined types were placed in this encounter. Thank you Dr Edyta Yung for referring Du Guevara to me for evaluation of his left knee:      BMI - Body mass index is 26.63 kg/m².   DM - no  Lab Results   Component Value Date    LABA1C 4.5 03/06/2021   Tobacco - yes, 1/2 ppd  On blood thinners - no  Personal Hx of DVT/PE - no  Immediate Family Hx of DVT/PE - no  Afib - no  CAD - no  Hx of stroke/TIA - cerebellar hemorrhage in 2016  Kidney Disease - no  EtOH consumption - no  Narcotics pre-op - no  Depression - no  Anxiety - no  HIV - no  Hep C - no  DMARDs and/or biologics - none  Dentition - edentulous  Living arrangements - girlfriend, single level, 8-9 steps to get in  Pets - none      Severe left knee arthritis  He is interested in surgical intervention/total knee arthroplasty  Given chronicity of his symptoms, failure of conservative treatment thus far, as well as his radiographic findings including the exposed metallic pin, I do think this is reasonable at this time    Details of TKA, success rate, and postop discussed  Overall 80% improved/satisfied (not necessarily completely pain free), 20% unsatisfied or worse  Factors associated with worse outcomes include preop narcotic usage, tobacco use, unmanaged depression/anxiety, noncompliance, pain out of proportion to radiographic findings, hyperalgesia/central sensitization/pain hypersensitivity/pain catastrophizing, poor coping skills and perseverance, morbid obesity, workers' compensation, etc.  For many patients, the knee does not feel the same or \"natural\", which is typical  Best predictor of postoperative range of motion is preoperative range of motion:   Obtaining full extension and flexion early on will be important as the further out from surgery, the harder it will become. Baby ASA BID for VTE prophylaxis unless risk factors dictate stronger anticoagulant required such as Eliquis, Xarelto, or Lovenox  Home usually same day    Risks and benefits of left total knee arthroplasty were discussed at length with the patient and an opportunity for questions was afforded. Possible complications of this surgery include, but are not limited to, stiffness, persistent pain, persistent swelling, infection, blood clots, weakness, component loosening and wear, periprosthetic fracture, neurovascular injury, numbness around the incision and lateral to it (infrapatellar branch of the saphenous nerve), bleeding, ligament injury, instability, and wound healing problems. The patient demonstrated a good understanding of the procedure, anticipated outcomes, possible complications, the post-operative therapy required, and at this time voiced desire to proceed. An informed consent form was signed in the office and the patient was given pre-operative instructions.   Preoperative optimization and H&P from primary care physician will be required prior to surgery, preop joint arthroplasty class attendance is mandatory, and I will see the patient back in clinic for the first post-operative visit. I discussed with the patient the negative consequences of tobacco use in relation to overall general health, but also surgical healing (wound, soft tissue/tendon/ligament, bone), and how tobacco use is associated with worse pain and worse functional outcomes. Briefly described cessation techniques, including gum, patches, and even medications such as Chantix. I have advised the patient to discuss with his primary care physician.   Will check cotinine preop, must be negative to proceed with surgery    He is advised to call his primary care physician Dr. Tonia Anderson regarding his HTN   He has no chest pain, no shortness of breath, no blurry vision, no dizziness, no headaches currently    Fela Harding MD

## 2023-06-20 ENCOUNTER — HOSPITAL ENCOUNTER (OUTPATIENT)
Dept: PHYSICAL THERAPY | Age: 63
Setting detail: THERAPIES SERIES
Discharge: HOME | End: 2023-06-20
Payer: COMMERCIAL

## 2023-06-20 PROCEDURE — 97112 NEUROMUSCULAR REEDUCATION: CPT

## 2023-06-20 PROCEDURE — 20560 NDL INSJ W/O NJX 1 OR 2 MUSC: CPT

## 2023-06-20 PROCEDURE — 97140 MANUAL THERAPY 1/> REGIONS: CPT

## 2023-06-20 NOTE — FLOWSHEET NOTE
[address] [facility] [address]  [address]  Phone: [phone]   Fax:     [phone]        Physical Therapy Treatment Note/ Progress Report:       Date:  2023    Patient Name:  Olena Hussein    :  1960  MRN: [id number]    Pertinent Medical History:     Medical/Treatment Diagnosis Information:  Medical Diagnosis: Left shoulder pain, unspecified chronicity [M25.512]  Strain of left shoulder, initial encounter [S46.560X]  Treatment Diagnosis: left shoulder and cervical movement dysfunction, decreased arom, decreased strength    Insurance/Certification information:  PT Insurance Information: Matias 145  Physician Information:  Celeste Osorio MD  Plan of care signed (Y/N): routed     Date of Patient follow up with Physician:      Progress Report: []  Yes  [x]  No     Date Range for reporting period:  Beginnin2023  Ending:      Progress report due (10 Rx/or 30 days whichever is less):      Recertification due (POC duration/ or 90 days whichever is less):      Visit # POC/Insurance Allowable Auth Needed    Buzz Saravia FOR 20 visits  to 23  codes [phone], [phone], [phone], [phone], [phone] [x]Yes    []No     Functional Outcomes Measure:    Test: UEFI  Date Assessed Score    19           Pain level:  4/10     History of Injury: Onset of symptoms:  2023 post fall   , To date symptoms have been treated with:  nothing  , Reported complaints of:  constant left shoulder and upper arm pain, denies radiation below elbow  ,Provocative factors:  elevation, reaching, lifting, getting dressed  , Easing factors:    rest , Paresthesias:   denies  , Sleep: is disturbed some nights   , Occupation/School:  property maintenance  , Activities: include nothing , He is right handed      Imaging: from  23 visit with Dr Riddhi Lynn were taken in the office today, 3 views of the

## 2023-06-22 ENCOUNTER — HOSPITAL ENCOUNTER (OUTPATIENT)
Dept: PHYSICAL THERAPY | Age: 63
Setting detail: THERAPIES SERIES
Discharge: HOME | End: 2023-06-22
Payer: COMMERCIAL

## 2023-06-22 PROCEDURE — 97112 NEUROMUSCULAR REEDUCATION: CPT

## 2023-06-22 PROCEDURE — 97140 MANUAL THERAPY 1/> REGIONS: CPT

## 2023-06-22 NOTE — FLOWSHEET NOTE
[address] [facility] [address]  [address]  Phone: [phone]   Fax:     [phone]        Physical Therapy Treatment Note/ Progress Report:       Date:  2023    Patient Name:  Valerie Brandon    :  1960  MRN: [id number]    Pertinent Medical History:     Medical/Treatment Diagnosis Information:  Medical Diagnosis: Left shoulder pain, unspecified chronicity [M25.512]  Strain of left shoulder, initial encounter [S4.707K]  Treatment Diagnosis: left shoulder and cervical movement dysfunction, decreased arom, decreased strength    Insurance/Certification information:  PT Insurance Information: Julioooevelyn 145  Physician Information:  Mirian Cheatham MD  Plan of care signed (Y/N): routed     Date of Patient follow up with Physician:      Progress Report: []  Yes  [x]  No     Date Range for reporting period:  Beginnin2023  Ending:      Progress report due (10 Rx/or 30 days whichever is less):      Recertification due (POC duration/ or 90 days whichever is less):      Visit # POC/Insurance Allowable Auth Needed    [facility]  to 23  codes [phone], [id number], [phone], [phone], [id number] [x]Yes    []No     Functional Outcomes Measure:    Test: UEFI  Date Assessed Score    19           Pain level:  4/10     History of Injury: Onset of symptoms:  2023 post fall   , To date symptoms have been treated with:  nothing  , Reported complaints of:  constant left shoulder and upper arm pain, denies radiation below elbow  ,Provocative factors:  elevation, reaching, lifting, getting dressed  , Easing factors:    rest , Paresthesias:   denies  , Sleep: is disturbed some nights   , Occupation/School:  property maintenance  , Activities: include nothing , He is right handed      Imaging: from  23 visit with Dr Fermin Carrasco were taken in the office today, 3 views of the

## 2023-06-26 ENCOUNTER — OFFICE VISIT (OUTPATIENT)
Dept: ORTHOPEDIC SURGERY | Age: 63
End: 2023-06-26
Payer: COMMERCIAL

## 2023-06-26 VITALS — RESPIRATION RATE: 16 BRPM | WEIGHT: 158 LBS | BODY MASS INDEX: 26.33 KG/M2 | HEIGHT: 65 IN

## 2023-06-26 DIAGNOSIS — M17.12 ARTHRITIS OF LEFT KNEE: Primary | ICD-10-CM

## 2023-06-26 PROCEDURE — 4004F PT TOBACCO SCREEN RCVD TLK: CPT | Performed by: ORTHOPAEDIC SURGERY

## 2023-06-26 PROCEDURE — G8419 CALC BMI OUT NRM PARAM NOF/U: HCPCS | Performed by: ORTHOPAEDIC SURGERY

## 2023-06-26 PROCEDURE — 3017F COLORECTAL CA SCREEN DOC REV: CPT | Performed by: ORTHOPAEDIC SURGERY

## 2023-06-26 PROCEDURE — 99213 OFFICE O/P EST LOW 20 MIN: CPT | Performed by: ORTHOPAEDIC SURGERY

## 2023-06-26 PROCEDURE — G8428 CUR MEDS NOT DOCUMENT: HCPCS | Performed by: ORTHOPAEDIC SURGERY

## 2023-07-10 ENCOUNTER — OFFICE VISIT (OUTPATIENT)
Dept: INTERNAL MEDICINE CLINIC | Age: 63
End: 2023-07-10
Payer: COMMERCIAL

## 2023-07-10 VITALS
HEART RATE: 75 BPM | BODY MASS INDEX: 25.99 KG/M2 | OXYGEN SATURATION: 96 % | HEIGHT: 65 IN | WEIGHT: 156 LBS | SYSTOLIC BLOOD PRESSURE: 130 MMHG | DIASTOLIC BLOOD PRESSURE: 80 MMHG

## 2023-07-10 DIAGNOSIS — N52.8 OTHER MALE ERECTILE DYSFUNCTION: ICD-10-CM

## 2023-07-10 DIAGNOSIS — I10 PRIMARY HYPERTENSION: ICD-10-CM

## 2023-07-10 DIAGNOSIS — Z63.4 BEREAVEMENT: ICD-10-CM

## 2023-07-10 DIAGNOSIS — Z87.891 PERSONAL HISTORY OF TOBACCO USE: ICD-10-CM

## 2023-07-10 DIAGNOSIS — J43.8 OTHER EMPHYSEMA (HCC): Primary | ICD-10-CM

## 2023-07-10 DIAGNOSIS — E55.9 VITAMIN D DEFICIENCY: ICD-10-CM

## 2023-07-10 DIAGNOSIS — E87.6 HYPOKALEMIA: ICD-10-CM

## 2023-07-10 DIAGNOSIS — M25.562 PAIN AND SWELLING OF KNEE, LEFT: ICD-10-CM

## 2023-07-10 DIAGNOSIS — M25.462 PAIN AND SWELLING OF KNEE, LEFT: ICD-10-CM

## 2023-07-10 PROCEDURE — 3023F SPIROM DOC REV: CPT | Performed by: INTERNAL MEDICINE

## 2023-07-10 PROCEDURE — 3079F DIAST BP 80-89 MM HG: CPT | Performed by: INTERNAL MEDICINE

## 2023-07-10 PROCEDURE — G0296 VISIT TO DETERM LDCT ELIG: HCPCS | Performed by: INTERNAL MEDICINE

## 2023-07-10 PROCEDURE — 1036F TOBACCO NON-USER: CPT | Performed by: INTERNAL MEDICINE

## 2023-07-10 PROCEDURE — 3017F COLORECTAL CA SCREEN DOC REV: CPT | Performed by: INTERNAL MEDICINE

## 2023-07-10 PROCEDURE — G8427 DOCREV CUR MEDS BY ELIG CLIN: HCPCS | Performed by: INTERNAL MEDICINE

## 2023-07-10 PROCEDURE — 3075F SYST BP GE 130 - 139MM HG: CPT | Performed by: INTERNAL MEDICINE

## 2023-07-10 PROCEDURE — G8419 CALC BMI OUT NRM PARAM NOF/U: HCPCS | Performed by: INTERNAL MEDICINE

## 2023-07-10 PROCEDURE — 99214 OFFICE O/P EST MOD 30 MIN: CPT | Performed by: INTERNAL MEDICINE

## 2023-07-10 RX ORDER — TADALAFIL 5 MG/1
5 TABLET ORAL DAILY PRN
Qty: 90 TABLET | Refills: 0 | Status: SHIPPED | OUTPATIENT
Start: 2023-07-10

## 2023-07-10 RX ORDER — TIOTROPIUM BROMIDE 18 UG/1
18 CAPSULE ORAL; RESPIRATORY (INHALATION) DAILY
Qty: 90 CAPSULE | Refills: 0 | Status: SHIPPED | OUTPATIENT
Start: 2023-07-10

## 2023-07-10 RX ORDER — AMLODIPINE BESYLATE 10 MG/1
10 TABLET ORAL DAILY
Qty: 90 TABLET | Refills: 0 | Status: SHIPPED | OUTPATIENT
Start: 2023-07-10

## 2023-07-10 RX ORDER — HYDROCHLOROTHIAZIDE 25 MG/1
25 TABLET ORAL EVERY MORNING
Qty: 90 TABLET | Refills: 0 | Status: SHIPPED | OUTPATIENT
Start: 2023-07-10

## 2023-07-10 RX ORDER — ACETAMINOPHEN 160 MG
TABLET,DISINTEGRATING ORAL
Qty: 90 CAPSULE | Refills: 0 | Status: SHIPPED | OUTPATIENT
Start: 2023-07-10

## 2023-07-10 SDOH — ECONOMIC STABILITY: FOOD INSECURITY: WITHIN THE PAST 12 MONTHS, THE FOOD YOU BOUGHT JUST DIDN'T LAST AND YOU DIDN'T HAVE MONEY TO GET MORE.: NEVER TRUE

## 2023-07-10 SDOH — SOCIAL STABILITY - SOCIAL INSECURITY: DISSAPEARANCE AND DEATH OF FAMILY MEMBER: Z63.4

## 2023-07-10 SDOH — ECONOMIC STABILITY: INCOME INSECURITY: HOW HARD IS IT FOR YOU TO PAY FOR THE VERY BASICS LIKE FOOD, HOUSING, MEDICAL CARE, AND HEATING?: NOT HARD AT ALL

## 2023-07-10 SDOH — ECONOMIC STABILITY: FOOD INSECURITY: WITHIN THE PAST 12 MONTHS, YOU WORRIED THAT YOUR FOOD WOULD RUN OUT BEFORE YOU GOT MONEY TO BUY MORE.: NEVER TRUE

## 2023-07-10 SDOH — ECONOMIC STABILITY: HOUSING INSECURITY
IN THE LAST 12 MONTHS, WAS THERE A TIME WHEN YOU DID NOT HAVE A STEADY PLACE TO SLEEP OR SLEPT IN A SHELTER (INCLUDING NOW)?: NO

## 2023-07-10 NOTE — PATIENT INSTRUCTIONS
[address] [facility] Locations - No appointment necessary. ? indicates the location is open Saturdays in addition to Monday through Friday. Call your preferred location for test preparation, business hours and other information you need. [employer] accepts [employer]. [facility]    ? [place] [id number] [facility]. [facility], [address]    Ph: [address] [facility], [address]    Ph: [phone]   ? [address]    [place], [address] [facility]    Ph: [address] [place], [address] [facility]    Ph: [phone] ? [place]   [address] [facility] [address] [facility]   Ph: [phone]  ? [address] [facility], [address] [facility]    Ph: [place] [address] [facility], [address] [facility]   Ph: [phone]    NORTH    ? [place], [place] [address]    Ph: [phone]  [facility]   [address] [facility], [facility]   Ph: Maya Fernandez. [place], [phone] [facility]: [address], [address] [place] [address]    Ph: [address] [facility]. [address] [place], [place] [address]    Ph: [phone]            Learning About Lung Cancer Screening  What is screening for lung cancer? Lung cancer screening is a way to find some lung cancers early, before a person has any symptoms of the cancer. Lung cancer screening may help those who have the highest risk for lung cancer--people age 48 and older who are or were heavy smokers. For most people, who aren't at increased risk, screening for lung cancer probably isn't helpful. Screening won't prevent cancer. And it may not find all lung cancers. Lung cancer screening may lower the risk of dying from lung cancer in a small number of people. How is it done?   Lung cancer screening is done with a low-dose CT

## 2023-07-10 NOTE — PROGRESS NOTES
SUBJECTIVE:  Vish Darby is a 63 y.o. male HERE FOR   Chief Complaint   Patient presents with    Follow-up    Hypertension        PT HERE FOR EVAL     EMPHYSEMA -  NO  WHEEZING, NO SOB. NO INCREASED INHALER USE. Buster Lenny. HTN - TAKING MEDS . BP ELEVATED. ? DIET / EXERCISE COMPLIANCE. NO HEADACHE, NO DIZZINESS   LT KNEE PAIN - INTERMITTENT. SHARP PAIN, OCC SWELLING . NO RAD, PAIN SCALE 8/10, DENIES TRAUMA. FOLLOWING WITH ORTHO  VIT D DEF -  TAKING MED.  IMPROVED - LAB D/W DPT  ED - ON MED - HELPING  HYPOKALEMIA - NOTED ON PREVIOUS LAB. ? NO MUSCLE ACHES. REPEAT LAB STILL PENDING  H/O SMOKER - CESSATION READDRESSED. STATES HAS NOT BEEN SMOKING  RECENTLY LOST SISTER - DENIES DEPRESSION. CONDOLENCES EXTENDED      DENIES CP, NO SOB , No PALPITATIONS, NO COUGH, NO F/C  No ABD PAIN, No N/V, No DIARRHEA, No CONSTIPATION, No MELENA, No HEMATOCHEZIA. No DYSURIA, No FREQ, No URGENCY, No HEMATURIA    PMH: REVIEWED AND UPDATED TODAY    PSH: REVIEWED AND UPDATED TODAY    SOCIAL HX: REVIEWED AND UPDATED TODAY    FAMILY HX: REVIEWED AND UPDATED TODAY    ALLERGY:  Pcn [penicillins]    MEDS: REVIEWED  Prior to Visit Medications    Medication Sig Taking? Authorizing Provider   naproxen (NAPROSYN) 500 MG tablet TAKE 1 TABLET BY MOUTH TWICE A DAY WITH MEALS Yes Sameh Ilene Lorenzo MD   tadalafil (CIALIS) 5 MG tablet TAKE 1 TABLET BY MOUTH DAILY AS NEEDED FOR ERECTILE DYSFUNCTION Yes Maggie Javier MD   SPIRIVA HANDIHALER 18 MCG inhalation capsule INHALE 1 CAPSULE INTO THE LUNGS DAILY.  Yes Maggie Javier MD   amLODIPine (NORVASC) 10 MG tablet TAKE 1 TABLET BY MOUTH EVERY DAY Yes Maggie Javier MD   hydroCHLOROthiazide (HYDRODIURIL) 25 MG tablet TAKE 1 TABLET BY MOUTH EVERY DAY IN THE MORNING Yes Maggie Javier MD   Cholecalciferol (VITAMIN D3) 50 MCG (2000 UT) CAPS TAKE 1 CAPSULE BY MOUTH EVERY DAY Yes Maggie Javier MD   diclofenac (VOLTAREN) 75 MG EC tablet TAKE 1 TABLET BY MOUTH TWICE A DAY AS NEEDED FOR PAIN Yes

## 2023-07-13 ENCOUNTER — HOSPITAL ENCOUNTER (OUTPATIENT)
Dept: PHYSICAL THERAPY | Age: 63
Setting detail: THERAPIES SERIES
Discharge: HOME | End: 2023-07-13
Payer: COMMERCIAL

## 2023-07-13 DIAGNOSIS — M25.512 LEFT SHOULDER PAIN, UNSPECIFIED CHRONICITY: ICD-10-CM

## 2023-07-13 DIAGNOSIS — S46.912A STRAIN OF LEFT SHOULDER, INITIAL ENCOUNTER: ICD-10-CM

## 2023-07-13 PROCEDURE — 97140 MANUAL THERAPY 1/> REGIONS: CPT

## 2023-07-13 PROCEDURE — 97112 NEUROMUSCULAR REEDUCATION: CPT

## 2023-07-13 RX ORDER — NAPROXEN 500 MG/1
TABLET ORAL
Qty: 60 TABLET | Refills: 0 | Status: SHIPPED | OUTPATIENT
Start: 2023-07-13

## 2023-07-13 NOTE — FLOWSHEET NOTE
Progression Towards Functional goals/ Treatment Progress Update:  [] Patient is progressing as expected towards functional goals listed. [x] Progression is slowed due to complexities/Impairments listed. [] Progression has been slowed due to co-morbidities. [] Plan just implemented, too soon to assess goals progression <30days   [] Goals require adjustment due to lack of progress  [] Patient is not progressing as expected and requires additional follow up with physician  [] Other    Prognosis for POC: [x] Good [] Fair  [] Poor    Patient requires continued skilled intervention: [x] Yes  [] No      PLAN:                   Reassess ans apply UEFI 10 th visit                   R   Review HEP/ reassess left shoulder cerv. Spine, continue thera ex. Cathlene Ceballos as tolerated   E-Stim not authorized  Decrease left shoulder inflammation, recover scapular stability and RC activation , pt ed with shoulder protection   [x] Continue per plan of care [] Alter current plan (see comments)  [] Plan of care initiated [] Hold pending MD visit [] Discharge    Electronically signed by: Corrina Martinez PT     Note: If patient does not return for scheduled/recommended follow up visits, this note will serve as a discharge from care along with the most recent update on progress.

## 2023-07-16 DIAGNOSIS — E55.9 VITAMIN D DEFICIENCY: ICD-10-CM

## 2023-07-17 RX ORDER — ACETAMINOPHEN 160 MG
TABLET,DISINTEGRATING ORAL
Qty: 90 CAPSULE | Refills: 0 | OUTPATIENT
Start: 2023-07-17

## 2023-07-18 ENCOUNTER — HOSPITAL ENCOUNTER (OUTPATIENT)
Dept: PHYSICAL THERAPY | Age: 63
Setting detail: THERAPIES SERIES
Discharge: HOME | End: 2023-07-18
Payer: COMMERCIAL

## 2023-07-18 ENCOUNTER — HOSPITAL ENCOUNTER (OUTPATIENT)
Dept: CT IMAGING | Age: 63
Discharge: HOME | End: 2023-07-18
Payer: COMMERCIAL

## 2023-07-18 DIAGNOSIS — Z87.891 PERSONAL HISTORY OF TOBACCO USE: ICD-10-CM

## 2023-07-18 PROCEDURE — 71271 CT THORAX LUNG CANCER SCR C-: CPT

## 2023-07-18 NOTE — FLOWSHEET NOTE
[address] [address]  Phone: [phone]   Fax:     [phone]    Physical Therapy  Cancellation/No-show Note  Patient Name:  Jalen Reyes  :  1960   Date:  2023  Cancelled visits to date: 1  No-shows to date: 1    Patient status for today's appointment patient:  []  Cancelled  []  Rescheduled appointment  [x]  No-show     Reason given by patient:  []  Patient ill  []  Conflicting appointment  []  No transportation    []  Conflict with work  [x]  No reason given  []  Other:     Comments:      Phone call information:   [x]  Phone call made today to patient at _4:40 time at number provided:      []  Patient answered, conversation as follows:    [x]  Patient did not answer, message left as follows: No message left as his answering service was unavailable.   []  Phone call not made today    Electronically signed by:  Silva Coffman PT

## 2023-07-20 ENCOUNTER — OFFICE VISIT (OUTPATIENT)
Dept: ORTHOPEDIC SURGERY | Age: 63
End: 2023-07-20
Payer: COMMERCIAL

## 2023-07-20 ENCOUNTER — HOSPITAL ENCOUNTER (OUTPATIENT)
Dept: PHYSICAL THERAPY | Age: 63
Setting detail: THERAPIES SERIES
Discharge: HOME | End: 2023-07-20
Payer: COMMERCIAL

## 2023-07-20 VITALS — WEIGHT: 156 LBS | HEIGHT: 65 IN | RESPIRATION RATE: 16 BRPM | BODY MASS INDEX: 25.99 KG/M2

## 2023-07-20 DIAGNOSIS — M11.20 CHONDROCALCINOSIS: ICD-10-CM

## 2023-07-20 DIAGNOSIS — T84.498A EXPOSED ORTHOPAEDIC HARDWARE (HCC): ICD-10-CM

## 2023-07-20 DIAGNOSIS — M24.662 ARTHROFIBROSIS OF KNEE JOINT, LEFT: ICD-10-CM

## 2023-07-20 DIAGNOSIS — M17.12 ARTHRITIS OF LEFT KNEE: Primary | ICD-10-CM

## 2023-07-20 DIAGNOSIS — Z72.0 TOBACCO ABUSE: ICD-10-CM

## 2023-07-20 PROCEDURE — 99215 OFFICE O/P EST HI 40 MIN: CPT | Performed by: ORTHOPAEDIC SURGERY

## 2023-07-20 PROCEDURE — 3017F COLORECTAL CA SCREEN DOC REV: CPT | Performed by: ORTHOPAEDIC SURGERY

## 2023-07-20 PROCEDURE — 1036F TOBACCO NON-USER: CPT | Performed by: ORTHOPAEDIC SURGERY

## 2023-07-20 PROCEDURE — G8419 CALC BMI OUT NRM PARAM NOF/U: HCPCS | Performed by: ORTHOPAEDIC SURGERY

## 2023-07-20 PROCEDURE — G8427 DOCREV CUR MEDS BY ELIG CLIN: HCPCS | Performed by: ORTHOPAEDIC SURGERY

## 2023-07-20 NOTE — PROGRESS NOTES
ORTHOPAEDIC CONSULTATION NOTE    Chief Complaint   Patient presents with    Follow-up     Left knee         HPI  7/20/23  Mr. Zuleyka Garrison returns to clinic today for follow-up of his left knee  I last saw him over a year ago and discussed total knee arthroplasty  However, we discussed the importance of complete tobacco cessation prior to surgical intervention  The patient saw Dr. 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 male seen in consultation at the request of Aleksandr Arroyo MD for evaluation of left knee pain:  Onset chronic/years  Injury/trauma none  History of symptoms - history of left knee open OCD repair when he was 14to 18years old  Pain is located anteromedial  Worse with activity, prolonged WB, ROM  Better with rest  Associated with swelling  Works in [employer] half a pack per day      Review of Systems  ROS from the Patient History Form dated on 3/21/22  Pertinent positives include peripheral edema, stroke  Rest of 13 point ROS otherwise negative except per HPI, and scanned into the patient's chart under the Media tab.       Allergies   Allergen Reactions    Pcn [Penicillins] Anaphylaxis and Swelling        Current Outpatient Medications   Medication Sig Dispense Refill    naproxen (NAPROSYN) 500 MG tablet TAKE 1 TABLET BY MOUTH TWICE A DAY WITH FOOD 60 tablet 0    tadalafil (CIALIS) 5 MG tablet Take 1 tablet by mouth daily as needed for Erectile Dysfunction for erectile dysfunction 90 tablet 0    tiotropium (SPIRIVA HANDIHALER) 18 MCG inhalation capsule Inhale 1 capsule into the lungs daily 90 capsule 0    amLODIPine (NORVASC) 10 MG tablet Take 1 tablet by mouth daily 90 tablet 0

## 2023-07-25 ENCOUNTER — APPOINTMENT (OUTPATIENT)
Dept: PHYSICAL THERAPY | Age: 63
End: 2023-07-25
Payer: COMMERCIAL

## 2023-07-27 ENCOUNTER — APPOINTMENT (OUTPATIENT)
Dept: PHYSICAL THERAPY | Age: 63
End: 2023-07-27
Payer: COMMERCIAL

## 2023-07-28 ENCOUNTER — OFFICE VISIT (OUTPATIENT)
Dept: ORTHOPEDIC SURGERY | Age: 63
End: 2023-07-28

## 2023-07-28 VITALS — BODY MASS INDEX: 25.99 KG/M2 | HEIGHT: 65 IN | WEIGHT: 156 LBS

## 2023-07-28 DIAGNOSIS — S46.912A STRAIN OF LEFT SHOULDER, INITIAL ENCOUNTER: Primary | ICD-10-CM

## 2023-08-01 ENCOUNTER — TELEPHONE (OUTPATIENT)
Dept: ORTHOPEDIC SURGERY | Age: 63
End: 2023-08-01

## 2023-08-01 NOTE — TELEPHONE ENCOUNTER
LM advising patient that MRI has been authorized. Central scheduling number provided. Advised patient to schedule follow up in office 1 week after MRI.

## 2023-08-02 ENCOUNTER — HOSPITAL ENCOUNTER (OUTPATIENT)
Dept: PHYSICAL THERAPY | Age: 63
Setting detail: THERAPIES SERIES
Discharge: HOME | End: 2023-08-02
Attending: ORTHOPAEDIC SURGERY

## 2023-08-02 ENCOUNTER — TELEPHONE (OUTPATIENT)
Dept: CASE MANAGEMENT | Age: 63
End: 2023-08-02

## 2023-08-02 NOTE — TELEPHONE ENCOUNTER
CT Lung Cancer Screening completed on 7/18/2023, ordering provider, Dr. Amalia Beal, routed radiology results with recommendations. Smoking history reviewed.

## 2023-08-02 NOTE — PLAN OF CARE
achieved in: 2 weeks  1. Independent in HEP and progression per patient tolerance, in order to prevent re-injury. [] Progressing: [] Met: [] Not Met: [] Adjusted  2. Patient will have a decrease in pain to facilitate improvement in movement, function, and ADLs as indicated by Functional Deficits. [] Progressing: [] Met: [] Not Met: [] Adjusted    Long Term Goals: To be achieved in: 8 weeks  1. Decrease *** functional outcome score from *** to ***  to assist with reaching prior level of function. [] Progressing: [] Met: [] Not Met: [] Adjusted  2. Patient will demonstrate increased AROM to wfl to allow for proper joint functioning as indicated by patients Functional Deficits. [] Progressing: [] Met: [] Not Met: [] Adjusted  3. Patient will demonstrate an increase in Strength to good proximal hip strength and control, within 5lb HHD in LE to allow for proper functional mobility as indicated by patients Functional Deficits. [] Progressing: [] Met: [] Not Met: [] Adjusted  4. Patient will return to walking, adl's functional activities without increased symptoms or restriction. [] Progressing: [] Met: [] Not Met: [] Adjusted  5. ***(patient specific functional goal)    [] Progressing: [] Met: [] Not Met: [] Adjusted     Electronically signed by:  Lily Martin, PT      Note: If patient does not return for scheduled/recommended follow up visits, this note will serve as a discharge from care along with the most recent update on progress.

## 2023-08-02 NOTE — FLOWSHEET NOTE
[address] [address]  Phone: [phone]   Fax:     [phone]    Physical Therapy  Cancellation/No-show Note  Patient Name:  Carrillo Hall  :  1960   Date:  2023  Cancelled visits to date: 0  No-shows to date: 1    Patient status for today's appointment patient:  []  Cancelled  []  Rescheduled appointment  [x]  No-show  NP NS  Removed all appts     Reason given by patient:  []  Patient ill  []  Conflicting appointment  []  No transportation    []  Conflict with work  []  No reason given  []  Other:     Comments:      Phone call information:   []  Phone call made today to patient at _ time at number provided:      []  Patient answered, conversation as follows:    []  Patient did not answer, message left as follows:  []  Phone call not made today    Electronically signed by:  Chandan Billingsley, PT

## 2023-08-09 DIAGNOSIS — S46.912A STRAIN OF LEFT SHOULDER, INITIAL ENCOUNTER: ICD-10-CM

## 2023-08-09 DIAGNOSIS — M25.512 LEFT SHOULDER PAIN, UNSPECIFIED CHRONICITY: ICD-10-CM

## 2023-08-09 RX ORDER — NAPROXEN 500 MG/1
TABLET ORAL
Qty: 60 TABLET | Refills: 0 | Status: SHIPPED | OUTPATIENT
Start: 2023-08-09

## 2023-08-09 NOTE — TELEPHONE ENCOUNTER
7-      IMPRESSION: Left shoulder pain/ contusion/ cuff strain. Apolonio Fothergill PLAN:  I discussed with the patient the treatment options including both surgical and non-surgical treatment. We recommended continue stretching exercises of the shoulder was taught to the patient today. He will take NSAIDS Naprosyn. I discussed with the patient that I think that he would really benefit from a course of physical therapy for further strengthening and stretching. He would like to do it on his own. Since he is continuing to have significant symptoms even with PT, we will obtain an MRI of the left shoulder in order to further evaluate RTC tear. F/U after MRI. He understands that this may need surgery if the pain did not to resolve.      Last office visit 7/28/2023     Last written 07-     Next office visit scheduled not schedule  Requested Prescriptions     Pending Prescriptions Disp Refills    naproxen (NAPROSYN) 500 MG tablet [Pharmacy Med Name: NAPROXEN 500 MG TABLET] 60 tablet 0     Sig: TAKE 1 TABLET BY MOUTH TWICE A DAY WITH FOOD

## 2023-10-10 ENCOUNTER — HOSPITAL ENCOUNTER (OUTPATIENT)
Age: 63
Discharge: HOME | End: 2023-10-10
Payer: COMMERCIAL

## 2023-10-10 DIAGNOSIS — Z72.0 TOBACCO ABUSE: ICD-10-CM

## 2023-10-10 LAB
25(OH)D3 SERPL-MCNC: 40.9 NG/ML
ALBUMIN SERPL-MCNC: 4.4 G/DL (ref 3.4–5)
ALBUMIN/GLOB SERPL: 1.3 {RATIO} (ref 1.1–2.2)
ALP SERPL-CCNC: 95 U/L (ref 40–129)
ALT SERPL-CCNC: 10 U/L (ref 10–40)
ANION GAP SERPL CALCULATED.3IONS-SCNC: 9 MMOL/L (ref 3–16)
AST SERPL-CCNC: 16 U/L (ref 15–37)
BASOPHILS # BLD: 0.1 K/UL (ref 0–0.2)
BASOPHILS NFR BLD: 1.1 %
BILIRUB SERPL-MCNC: 0.6 MG/DL (ref 0–1)
BUN SERPL-MCNC: 18 MG/DL (ref 7–20)
CALCIUM SERPL-MCNC: 9.7 MG/DL (ref 8.3–10.6)
CHLORIDE SERPL-SCNC: 103 MMOL/L (ref 99–110)
CO2 SERPL-SCNC: 27 MMOL/L (ref 21–32)
CREAT SERPL-MCNC: 1.2 MG/DL (ref 0.8–1.3)
CREAT UR-MCNC: 51.4 MG/DL (ref 39–259)
DEPRECATED RDW RBC AUTO: 14.8 % (ref 12.4–15.4)
EOSINOPHIL # BLD: 0.2 K/UL (ref 0–0.6)
EOSINOPHIL NFR BLD: 3.2 %
GFR SERPLBLD CREATININE-BSD FMLA CKD-EPI: >60 ML/MIN/{1.73_M2}
GLUCOSE SERPL-MCNC: 96 MG/DL (ref 70–99)
HCT VFR BLD AUTO: 43.7 % (ref 40.5–52.5)
HGB BLD-MCNC: 14 G/DL (ref 13.5–17.5)
LYMPHOCYTES # BLD: 2.5 K/UL (ref 1–5.1)
LYMPHOCYTES NFR BLD: 37.6 %
MCH RBC QN AUTO: 28.9 PG (ref 26–34)
MCHC RBC AUTO-ENTMCNC: 31.9 G/DL (ref 31–36)
MCV RBC AUTO: 90.4 FL (ref 80–100)
MICROALBUMIN UR DL<=1MG/L-MCNC: 4.7 MG/DL
MICROALBUMIN/CREAT UR: 91.4 MG/G (ref 0–30)
MONOCYTES # BLD: 0.6 K/UL (ref 0–1.3)
MONOCYTES NFR BLD: 9.7 %
NEUTROPHILS # BLD: 3.2 K/UL (ref 1.7–7.7)
NEUTROPHILS NFR BLD: 48.4 %
PLATELET # BLD AUTO: 203 K/UL (ref 135–450)
PLATELET BLD QL SMEAR: ADEQUATE
PMV BLD AUTO: 11.3 FL (ref 5–10.5)
POTASSIUM SERPL-SCNC: 3.9 MMOL/L (ref 3.5–5.1)
PROT SERPL-MCNC: 7.7 G/DL (ref 6.4–8.2)
RBC # BLD AUTO: 4.84 M/UL (ref 4.2–5.9)
SLIDE REVIEW: ABNORMAL
SODIUM SERPL-SCNC: 139 MMOL/L (ref 136–145)
TSH SERPL DL<=0.005 MIU/L-ACNC: 0.94 UIU/ML (ref 0.27–4.2)
WBC # BLD AUTO: 6.6 K/UL (ref 4–11)

## 2023-10-10 PROCEDURE — G0480 DRUG TEST DEF 1-7 CLASSES: HCPCS

## 2023-10-10 PROCEDURE — 80053 COMPREHEN METABOLIC PANEL: CPT

## 2023-10-10 PROCEDURE — 36415 COLL VENOUS BLD VENIPUNCTURE: CPT

## 2023-10-10 PROCEDURE — 82306 VITAMIN D 25 HYDROXY: CPT

## 2023-10-10 PROCEDURE — 82570 ASSAY OF URINE CREATININE: CPT

## 2023-10-10 PROCEDURE — 85025 COMPLETE CBC W/AUTO DIFF WBC: CPT

## 2023-10-10 PROCEDURE — 84443 ASSAY THYROID STIM HORMONE: CPT

## 2023-10-10 PROCEDURE — 82043 UR ALBUMIN QUANTITATIVE: CPT

## 2023-10-12 LAB
COTININE SERPL-MCNC: 329 NG/ML
NICOTINE SERPL-MCNC: 15 NG/ML

## 2023-10-13 ENCOUNTER — TELEPHONE (OUTPATIENT)
Dept: ORTHOPEDIC SURGERY | Age: 63
End: 2023-10-13

## 2023-10-13 NOTE — TELEPHONE ENCOUNTER
Tried calling patient to give lab results -- lady answered phone and said no Yonis at that # and I had the wrong number -- no other number listed for patient

## 2023-10-13 NOTE — TELEPHONE ENCOUNTER
----- Message from Wale Andersen MD sent at 10/12/2023  8:46 PM EDT -----  Cotinine way high    ----- Message -----  From: Tracy Ring Incoming Lab Results From Soft (Manuel Murphy)  Sent: 10/12/2023   8:15 PM EDT  To: Wale Andersen MD

## 2023-10-31 ENCOUNTER — OFFICE VISIT (OUTPATIENT)
Dept: INTERNAL MEDICINE CLINIC | Age: 63
End: 2023-10-31
Payer: COMMERCIAL

## 2023-10-31 VITALS
SYSTOLIC BLOOD PRESSURE: 150 MMHG | WEIGHT: 161 LBS | OXYGEN SATURATION: 99 % | HEART RATE: 70 BPM | DIASTOLIC BLOOD PRESSURE: 100 MMHG | BODY MASS INDEX: 26.79 KG/M2

## 2023-10-31 DIAGNOSIS — J43.8 OTHER EMPHYSEMA (HCC): ICD-10-CM

## 2023-10-31 DIAGNOSIS — M25.562 PAIN AND SWELLING OF KNEE, LEFT: Primary | ICD-10-CM

## 2023-10-31 DIAGNOSIS — R05.1 ACUTE COUGH: ICD-10-CM

## 2023-10-31 DIAGNOSIS — M25.462 PAIN AND SWELLING OF KNEE, LEFT: Primary | ICD-10-CM

## 2023-10-31 DIAGNOSIS — E55.9 VITAMIN D DEFICIENCY: ICD-10-CM

## 2023-10-31 DIAGNOSIS — F17.210 SMOKES CIGARETTES: ICD-10-CM

## 2023-10-31 DIAGNOSIS — I10 HYPERTENSION, UNCONTROLLED: ICD-10-CM

## 2023-10-31 PROCEDURE — 96372 THER/PROPH/DIAG INJ SC/IM: CPT | Performed by: INTERNAL MEDICINE

## 2023-10-31 PROCEDURE — G8427 DOCREV CUR MEDS BY ELIG CLIN: HCPCS | Performed by: INTERNAL MEDICINE

## 2023-10-31 PROCEDURE — 99215 OFFICE O/P EST HI 40 MIN: CPT | Performed by: INTERNAL MEDICINE

## 2023-10-31 PROCEDURE — 3023F SPIROM DOC REV: CPT | Performed by: INTERNAL MEDICINE

## 2023-10-31 PROCEDURE — 3077F SYST BP >= 140 MM HG: CPT | Performed by: INTERNAL MEDICINE

## 2023-10-31 PROCEDURE — 99406 BEHAV CHNG SMOKING 3-10 MIN: CPT | Performed by: INTERNAL MEDICINE

## 2023-10-31 PROCEDURE — 3080F DIAST BP >= 90 MM HG: CPT | Performed by: INTERNAL MEDICINE

## 2023-10-31 PROCEDURE — 4004F PT TOBACCO SCREEN RCVD TLK: CPT | Performed by: INTERNAL MEDICINE

## 2023-10-31 PROCEDURE — 3017F COLORECTAL CA SCREEN DOC REV: CPT | Performed by: INTERNAL MEDICINE

## 2023-10-31 PROCEDURE — G8419 CALC BMI OUT NRM PARAM NOF/U: HCPCS | Performed by: INTERNAL MEDICINE

## 2023-10-31 PROCEDURE — G8484 FLU IMMUNIZE NO ADMIN: HCPCS | Performed by: INTERNAL MEDICINE

## 2023-10-31 RX ORDER — METHYLPREDNISOLONE 4 MG/1
TABLET ORAL
Qty: 1 KIT | Refills: 0 | Status: SHIPPED | OUTPATIENT
Start: 2023-10-31 | End: 2023-11-06

## 2023-10-31 RX ORDER — TIOTROPIUM BROMIDE 18 UG/1
18 CAPSULE ORAL; RESPIRATORY (INHALATION) DAILY
Qty: 90 CAPSULE | Refills: 0 | Status: SHIPPED | OUTPATIENT
Start: 2023-10-31

## 2023-10-31 RX ORDER — TRIAMCINOLONE ACETONIDE 40 MG/ML
40 INJECTION, SUSPENSION INTRA-ARTICULAR; INTRAMUSCULAR ONCE
Status: COMPLETED | OUTPATIENT
Start: 2023-10-31 | End: 2023-10-31

## 2023-10-31 RX ORDER — BUPROPION HYDROCHLORIDE 150 MG/1
150 TABLET ORAL EVERY MORNING
Qty: 30 TABLET | Refills: 1 | Status: SHIPPED | OUTPATIENT
Start: 2023-10-31

## 2023-10-31 RX ORDER — AMLODIPINE BESYLATE 10 MG/1
10 TABLET ORAL DAILY
Qty: 90 TABLET | Refills: 0 | Status: SHIPPED | OUTPATIENT
Start: 2023-10-31

## 2023-10-31 RX ORDER — ACETAMINOPHEN 160 MG
TABLET,DISINTEGRATING ORAL
Qty: 90 CAPSULE | Refills: 0 | Status: SHIPPED | OUTPATIENT
Start: 2023-10-31

## 2023-10-31 RX ORDER — KETOROLAC TROMETHAMINE 30 MG/ML
60 INJECTION, SOLUTION INTRAMUSCULAR; INTRAVENOUS ONCE
Status: COMPLETED | OUTPATIENT
Start: 2023-10-31 | End: 2023-10-31

## 2023-10-31 RX ORDER — HYDROCHLOROTHIAZIDE 25 MG/1
25 TABLET ORAL EVERY MORNING
Qty: 90 TABLET | Refills: 0 | Status: SHIPPED | OUTPATIENT
Start: 2023-10-31

## 2023-10-31 RX ORDER — BENZONATATE 100 MG/1
100 CAPSULE ORAL 2 TIMES DAILY PRN
Qty: 30 CAPSULE | Refills: 0 | Status: SHIPPED | OUTPATIENT
Start: 2023-10-31

## 2023-10-31 RX ORDER — LABETALOL 300 MG/1
300 TABLET, FILM COATED ORAL DAILY
Qty: 90 TABLET | Refills: 0 | Status: SHIPPED | OUTPATIENT
Start: 2023-10-31

## 2023-10-31 RX ADMIN — KETOROLAC TROMETHAMINE 60 MG: 30 INJECTION, SOLUTION INTRAMUSCULAR; INTRAVENOUS at 17:29

## 2023-10-31 RX ADMIN — TRIAMCINOLONE ACETONIDE 40 MG: 40 INJECTION, SUSPENSION INTRA-ARTICULAR; INTRAMUSCULAR at 17:30

## 2023-10-31 NOTE — PROGRESS NOTES
SUBJECTIVE:  Darrius Sánchez is a 66 y.o. male HERE FOR   Chief Complaint   Patient presents with    Leg Pain    Other        PT HERE FOR EVAL     LT KNEE PAIN - INCREASED PAST 1 WEEK. SHARP PAIN, NO RAD, PAIN SCALE 10/10. + SWELLING. NO RAD,  DENIES TRAUMA. NO  REDNESS  DENIES LEG PAIN  HTN - OUT OF NORVASC. TAKING  OTHER MEDS . BP ELEVATED. ? DIET / EXERCISE COMPLIANCE. NO HEADACHE, NO DIZZINESS   EMPHYSEMA -  NO  WHEEZING, NO SOB. NO INCREASED INHALER USE. Dirk Mccammon. VIT D DEF -  TAKING MED. LAB D/W PT  C/O COUGH - TODAY. NON PRODUCTIVE, NO F/C. STATES  COVID TEST NEGATIVE  + SMOKER - CESSATION ADVISED  HYPOKALEMIA - RESOLVED - LAB D/W PT       DENIES CP, NO SOB , No PALPITATIONS  No ABD PAIN, No N/V, No DIARRHEA, No CONSTIPATION, No MELENA, No HEMATOCHEZIA. No DYSURIA, No FREQ, No URGENCY, No HEMATURIA       PMH: REVIEWED AND UPDATED TODAY    PSH: REVIEWED AND UPDATED TODAY    SOCIAL HX: REVIEWED AND UPDATED TODAY    FAMILY HX: REVIEWED AND UPDATED TODAY    ALLERGY:  Pcn [penicillins]    MEDS: REVIEWED  Prior to Visit Medications    Medication Sig Taking?  Authorizing Provider   naproxen (NAPROSYN) 500 MG tablet TAKE 1 TABLET BY MOUTH TWICE A DAY WITH FOOD Yes Harjinder Ovalles MD   tadalafil (CIALIS) 5 MG tablet Take 1 tablet by mouth daily as needed for Erectile Dysfunction for erectile dysfunction Yes Irwin Estrada MD   tiotropium ([place]indy [place]ane) 18 MCG inhalation capsule Inhale 1 capsule into the lungs daily Yes Irwin Estrada MD   amLODIPine (NORVASC) 10 MG tablet Take 1 tablet by mouth daily Yes Irwin Estrada MD   hydroCHLOROthiazide (HYDRODIURIL) 25 MG tablet Take 1 tablet by mouth every morning Yes Irwin Estrada MD   Cholecalciferol (VITAMIN D3) 50 MCG (2000 UT) CAPS TAKE 1 CAPSULE BY MOUTH EVERY DAY Yes Irwin Estrada MD   labetalol (NORMODYNE) 300 MG tablet TAKE 1 TABLET BY MOUTH EVERY DAY Yes Irwin Estrada MD   aspirin (ASPIRIN LOW DOSE) 81 MG chewable tablet TAKE 1

## 2023-10-31 NOTE — PATIENT INSTRUCTIONS
TAKE MED AS ADVISED    DIET/ EXERCISE. FOLLOW UP WITHIN 6 WEEKS / AS NEEDED    FOLLOW UP FOR LABS, [place], [address] [facility] Locations - No appointment necessary. ? indicates the location is open Saturdays in addition to Monday through Friday. Call your preferred location for test preparation, business hours and other information you need. [employer] accepts [employer]. CENTRAL  EAST  [place]    ? [place] [id number] [facility] [facility], [address]    Ph: [address] [facility], [address]    Ph: [phone]   ? [address]    [place], [address] [facility]    Ph: [address] [facility] [address] [place], [address]    Ph: [phone] ? [place]   [address] [place], [address] [place] [address]   Ph: [phone]  ? [address] [facility], [address] [facility]    Ph: [place] [address] [facility], [address] [facility]   Ph: [phone]    NORTH    ? [facility] [place] [address]    Ph: [phone]  [facility]   [address] [facility], [facility]   Ph: Maya Parks. [facility] [phone] [address]: [phone] [place], [facility] [facility]    Ph: [address] [facility].  [address] [place] [address]    Ph: [phone]

## 2023-11-03 ENCOUNTER — TELEPHONE (OUTPATIENT)
Dept: INTERNAL MEDICINE CLINIC | Age: 63
End: 2023-11-03

## 2023-11-03 NOTE — TELEPHONE ENCOUNTER
Patient informed pharmacy that he wants to use medication zydan to stop smoking instead of wellbutrin please advise.

## 2024-01-27 DIAGNOSIS — J43.8 OTHER EMPHYSEMA (HCC): ICD-10-CM

## 2024-01-27 DIAGNOSIS — I10 HYPERTENSION, UNCONTROLLED: ICD-10-CM

## 2024-01-29 NOTE — TELEPHONE ENCOUNTER
Medication:   Requested Prescriptions     Pending Prescriptions Disp Refills    labetalol (NORMODYNE) 300 MG tablet [Pharmacy Med Name: LABETALOL  MG TABLET] 90 tablet 0     Sig: TAKE 1 TABLET BY MOUTH DAILY    SPIRIVA HANDIHALER 18 MCG inhalation capsule [Pharmacy Med Name: SPIRIVA HANDIHALER 18 MCG CAP] 90 capsule 0     Sig: INHALE THE ENTIRE CONTENTS OF 1 CAPSULE ONCE A DAY USING HANDIHALER DEVICE    amLODIPine (NORVASC) 10 MG tablet [Pharmacy Med Name: amLODIPine BESYLATE 10 MG TAB] 90 tablet 0     Sig: TAKE 1 TABLET BY MOUTH DAILY    hydroCHLOROthiazide (HYDRODIURIL) 25 MG tablet [Pharmacy Med Name: hydroCHLOROthiazide 25 MG TABLET] 90 tablet 0     Sig: TAKE ONE TABLET BY MOUTH EVERY MORNING        Last Filled:      Last appt: 10/31/2023   Next appt: Visit date not found  Return in about 6 weeks (around 12/12/2023).  Last OARRS:        No data to display

## 2024-01-30 RX ORDER — TIOTROPIUM BROMIDE 18 UG/1
CAPSULE ORAL; RESPIRATORY (INHALATION)
Qty: 90 CAPSULE | Refills: 0 | Status: SHIPPED | OUTPATIENT
Start: 2024-01-30

## 2024-01-30 RX ORDER — AMLODIPINE BESYLATE 10 MG/1
10 TABLET ORAL DAILY
Qty: 90 TABLET | Refills: 0 | Status: SHIPPED | OUTPATIENT
Start: 2024-01-30

## 2024-01-30 RX ORDER — LABETALOL 300 MG/1
300 TABLET, FILM COATED ORAL DAILY
Qty: 90 TABLET | Refills: 0 | Status: SHIPPED | OUTPATIENT
Start: 2024-01-30

## 2024-01-30 RX ORDER — HYDROCHLOROTHIAZIDE 25 MG/1
25 TABLET ORAL EVERY MORNING
Qty: 90 TABLET | Refills: 0 | Status: SHIPPED | OUTPATIENT
Start: 2024-01-30

## 2024-01-31 ENCOUNTER — TELEPHONE (OUTPATIENT)
Dept: INTERNAL MEDICINE CLINIC | Age: 64
End: 2024-01-31

## 2024-02-01 NOTE — TELEPHONE ENCOUNTER
Submitted PA for Tiotropium Bromide Monohydrate 18MCG capsules  [facility] Key: [id number] STATUS: PENDING.    Follow up done daily; if no decision with in three days we will refax.  If another three days goes by with no decision will call the insurance for status.

## 2024-04-01 ENCOUNTER — TELEPHONE (OUTPATIENT)
Dept: INTERNAL MEDICINE CLINIC | Age: 64
End: 2024-04-01

## 2024-04-01 DIAGNOSIS — I10 HYPERTENSION, UNCONTROLLED: ICD-10-CM

## 2024-04-01 DIAGNOSIS — I10 ESSENTIAL HYPERTENSION: ICD-10-CM

## 2024-04-01 RX ORDER — AMLODIPINE BESYLATE 10 MG/1
10 TABLET ORAL DAILY
Qty: 30 TABLET | Refills: 0 | Status: SHIPPED | OUTPATIENT
Start: 2024-04-01

## 2024-04-01 RX ORDER — ASPIRIN 81 MG/1
TABLET, CHEWABLE ORAL
Qty: 30 TABLET | Refills: 0 | Status: SHIPPED | OUTPATIENT
Start: 2024-04-01

## 2024-04-01 NOTE — TELEPHONE ENCOUNTER
Medication:   Requested Prescriptions      No prescriptions requested or ordered in this encounter        Last Filled:      Patient Phone Number: [phone] (home)     Last appt: 10/31/2023   Next appt: 4/10/2024    Last OARRS:        No data to display

## 2024-04-01 NOTE — TELEPHONE ENCOUNTER
Patient needs a refill on the Amlodipine 10 mg and the aspirin 81 mg (he is completely out to the Asprin)  Please advise

## 2024-04-10 ENCOUNTER — OFFICE VISIT (OUTPATIENT)
Dept: INTERNAL MEDICINE CLINIC | Age: 64
End: 2024-04-10
Payer: COMMERCIAL

## 2024-04-10 VITALS
BODY MASS INDEX: 25.02 KG/M2 | OXYGEN SATURATION: 95 % | DIASTOLIC BLOOD PRESSURE: 100 MMHG | HEART RATE: 69 BPM | WEIGHT: 155 LBS | SYSTOLIC BLOOD PRESSURE: 158 MMHG

## 2024-04-10 DIAGNOSIS — L30.9 DERMATITIS: ICD-10-CM

## 2024-04-10 DIAGNOSIS — E55.9 VITAMIN D DEFICIENCY: ICD-10-CM

## 2024-04-10 DIAGNOSIS — I10 HYPERTENSION, UNCONTROLLED: Primary | ICD-10-CM

## 2024-04-10 DIAGNOSIS — M25.562 CHRONIC PAIN OF LEFT KNEE: ICD-10-CM

## 2024-04-10 DIAGNOSIS — G89.29 CHRONIC PAIN OF LEFT KNEE: ICD-10-CM

## 2024-04-10 DIAGNOSIS — J43.8 OTHER EMPHYSEMA (HCC): ICD-10-CM

## 2024-04-10 DIAGNOSIS — F17.210 SMOKES CIGARETTES: ICD-10-CM

## 2024-04-10 PROCEDURE — G8427 DOCREV CUR MEDS BY ELIG CLIN: HCPCS | Performed by: INTERNAL MEDICINE

## 2024-04-10 PROCEDURE — 3017F COLORECTAL CA SCREEN DOC REV: CPT | Performed by: INTERNAL MEDICINE

## 2024-04-10 PROCEDURE — 3077F SYST BP >= 140 MM HG: CPT | Performed by: INTERNAL MEDICINE

## 2024-04-10 PROCEDURE — G8419 CALC BMI OUT NRM PARAM NOF/U: HCPCS | Performed by: INTERNAL MEDICINE

## 2024-04-10 PROCEDURE — 99214 OFFICE O/P EST MOD 30 MIN: CPT | Performed by: INTERNAL MEDICINE

## 2024-04-10 PROCEDURE — 3080F DIAST BP >= 90 MM HG: CPT | Performed by: INTERNAL MEDICINE

## 2024-04-10 PROCEDURE — 3023F SPIROM DOC REV: CPT | Performed by: INTERNAL MEDICINE

## 2024-04-10 PROCEDURE — 99406 BEHAV CHNG SMOKING 3-10 MIN: CPT | Performed by: INTERNAL MEDICINE

## 2024-04-10 PROCEDURE — 4004F PT TOBACCO SCREEN RCVD TLK: CPT | Performed by: INTERNAL MEDICINE

## 2024-04-10 RX ORDER — TRIAMCINOLONE ACETONIDE 1 MG/G
OINTMENT TOPICAL
Qty: 60 G | Refills: 0 | Status: SHIPPED | OUTPATIENT
Start: 2024-04-10 | End: 2024-04-17

## 2024-04-10 RX ORDER — AMLODIPINE BESYLATE 10 MG/1
10 TABLET ORAL DAILY
Qty: 90 TABLET | Refills: 0 | Status: SHIPPED | OUTPATIENT
Start: 2024-04-10

## 2024-04-10 RX ORDER — LABETALOL 300 MG/1
300 TABLET, FILM COATED ORAL DAILY
Qty: 90 TABLET | Refills: 0 | Status: SHIPPED | OUTPATIENT
Start: 2024-04-10

## 2024-04-10 RX ORDER — HYDROCHLOROTHIAZIDE 25 MG/1
25 TABLET ORAL EVERY MORNING
Qty: 90 TABLET | Refills: 0 | Status: SHIPPED | OUTPATIENT
Start: 2024-04-10

## 2024-04-10 RX ORDER — TIOTROPIUM BROMIDE 18 UG/1
CAPSULE ORAL; RESPIRATORY (INHALATION)
Qty: 90 CAPSULE | Refills: 0 | Status: SHIPPED | OUTPATIENT
Start: 2024-04-10

## 2024-04-10 RX ORDER — AMMONIUM LACTATE 12 G/100G
LOTION TOPICAL
Qty: 225 G | Refills: 0 | Status: SHIPPED | OUTPATIENT
Start: 2024-04-10

## 2024-04-10 RX ORDER — ACETAMINOPHEN 160 MG
TABLET,DISINTEGRATING ORAL
Qty: 90 CAPSULE | Refills: 0 | Status: SHIPPED | OUTPATIENT
Start: 2024-04-10

## 2024-04-10 RX ORDER — BUPROPION HYDROCHLORIDE 150 MG/1
150 TABLET ORAL EVERY MORNING
Qty: 30 TABLET | Refills: 1 | Status: SHIPPED | OUTPATIENT
Start: 2024-04-10

## 2024-04-10 ASSESSMENT — PATIENT HEALTH QUESTIONNAIRE - PHQ9
SUM OF ALL RESPONSES TO PHQ QUESTIONS 1-9: 0
SUM OF ALL RESPONSES TO PHQ9 QUESTIONS 1 & 2: 0
1. LITTLE INTEREST OR PLEASURE IN DOING THINGS: NOT AT ALL
SUM OF ALL RESPONSES TO PHQ QUESTIONS 1-9: 0
2. FEELING DOWN, DEPRESSED OR HOPELESS: NOT AT ALL
SUM OF ALL RESPONSES TO PHQ QUESTIONS 1-9: 0
SUM OF ALL RESPONSES TO PHQ QUESTIONS 1-9: 0

## 2024-04-10 NOTE — PROGRESS NOTES
Kajal Amezcua MD   naproxen (NAPROSYN) 500 MG tablet TAKE 1 TABLET BY MOUTH TWICE A DAY WITH FOOD Yes Jeanne Jo MD   tadalafil (CIALIS) 5 MG tablet Take 1 tablet by mouth daily as needed for Erectile Dysfunction for erectile dysfunction Yes Kajal Amezcua MD   albuterol sulfate HFA (VENTOLIN HFA) 108 (90 Base) MCG/ACT inhaler Inhale 2 puffs into the lungs every 6 hours as needed for Wheezing or Shortness of Breath Yes Kajal Amezcua MD   acetaminophen (TYLENOL) 325 MG tablet Take 2 tablets by mouth as needed Yes Provider, MD Marika   nicotine (NICODERM CQ) 14 MG/24HR Place 1 patch onto the skin daily for 14 days  Kajal Amezcua MD   meloxicam (MOBIC) 7.5 MG tablet Take 1 tablet by mouth daily as needed for Pain  Kajal Amezcua MD       ROS: COMPREHENSIVE ROS AS IN HX, REST -VE  History obtained from chart review and the patient       OBJECTIVE:   NURSING NOTE AND VITALS REVIEWED  BP (!) 140/100   Pulse 69   Wt 70.3 kg (155 lb)   SpO2 95%   BMI 25.02 kg/m²     NO ACUTE DISTRESS  + NICOTINE BREATH    REPEAT BP: 158/100 (RT), NO ORTHOSTASIS     Body mass index is 25.02 kg/m².     HEENT: NO PALLOR, ANICTERIC, PERRLA, EOMI, NO CONJUNCTIVAL ERYTHEMA,                 NO SINUS TENDERNESS  NECK:  SUPPLE, TRACHEA MIDLINE, NT, NO JVD, NO CB, NO LA, NO TM, NO STIFFNESS  CHEST: RESPY EFFORT NL, GOOD AE, NO W/R/C  HEART: S1S2+ REG, NO M/G/R  ABD: SOFT, NT, NO HSM, BS+  EXT: NO EDEMA, NT, PULSES +. SARA'S -VE  NEURO: ALERT AND ORIENTED X 3, NO MENINGEAL SIGNS, NO TREMORS, AMBULATING WITH A LIMP OTHERWISE, NO FOCAL DEFICITS  PSYCH: FAIRLY GOOD AFFECT  BACK: NT, NO ROM, NO CVA TENDERNESS  KNEE: HEALED SURGICAL SCAR - OLD - LT. NO SWELLING. BONY PROMINENCE MEDIAL  LT,  NO LOCALIZED TENDERNESS, OCC  PAIN WITH MVT, NO ROM, NO ERYTHEMA, NO WARMTH  SKIN: HYPERPIGMENTED MACULAR PATCHES UPPER BACK - MOSTLY RT AND MIDLINE LOWER BACK, + DRY SCALY PATCHES LOWER BACK      PREVIOUS LABS REVIEWED AND D/W PT / LAST

## 2024-04-10 NOTE — PATIENT INSTRUCTIONS
TAKE MED AS ADVISED    DIET/ EXERCISE.    FOLLOW UP WITHIN 3 MONTHS / AS NEEDED    FOLLOW UP FOR LABSS    [facility] Laboratory Locations - No appointment necessary.  ? indicates the location is open Saturdays in addition to Monday through Friday.   Call your preferred location for test preparation, business hours and other information you need.   [facility] accepts all insurances.  CENTRAL  EAST  [place]    ? [place]   [address]   [address]   [place] [address]    Ph: [phone]  [facility]   [address] [place] [address]    Ph: [phone]   ? [place]   [address]    [place] [address]    Ph: [phone]     [facility]   [address] [place] [address]    Ph: [phone] ? [place]   [address] [place] [address]   Ph: [phone]  ? [facility]   [address] [place] [address]    Ph: [phone]      [place]   [address] [place] [address]   Ph: [phone]    NORTH    ? [facility]   [address] [address]    Ph: [phone]  [facility]   [address] [place] [address]   Ph: [phone]  [place]   [address] [place], [place] [address]vd.   [facility], [address]    PH: [phone]    [facility]   [address] [place] [address]    Ph: [phone]  [place]  [address] [place] [address]  Ph: [phone]  [facility]   [address] [place] [address]    Ph: [phone]

## 2024-04-29 DIAGNOSIS — M25.462 PAIN AND SWELLING OF KNEE, LEFT: ICD-10-CM

## 2024-04-29 DIAGNOSIS — I10 HYPERTENSION, UNCONTROLLED: ICD-10-CM

## 2024-04-29 DIAGNOSIS — M25.562 PAIN AND SWELLING OF KNEE, LEFT: ICD-10-CM

## 2024-04-29 LAB
ALBUMIN SERPL-MCNC: 4.6 G/DL (ref 3.4–5)
ALBUMIN/GLOB SERPL: 2 {RATIO} (ref 1.1–2.2)
ALP SERPL-CCNC: 73 U/L (ref 40–129)
ALT SERPL-CCNC: 8 U/L (ref 10–40)
ANION GAP SERPL CALCULATED.3IONS-SCNC: 10 MMOL/L (ref 3–16)
AST SERPL-CCNC: 16 U/L (ref 15–37)
BASOPHILS # BLD: 0.1 K/UL (ref 0–0.2)
BASOPHILS NFR BLD: 1.3 %
BILIRUB SERPL-MCNC: 0.5 MG/DL (ref 0–1)
BUN SERPL-MCNC: 12 MG/DL (ref 7–20)
CALCIUM SERPL-MCNC: 9.4 MG/DL (ref 8.3–10.6)
CHLORIDE SERPL-SCNC: 107 MMOL/L (ref 99–110)
CO2 SERPL-SCNC: 25 MMOL/L (ref 21–32)
CREAT SERPL-MCNC: 1 MG/DL (ref 0.8–1.3)
DEPRECATED RDW RBC AUTO: 15.2 % (ref 12.4–15.4)
EOSINOPHIL # BLD: 0.2 K/UL (ref 0–0.6)
EOSINOPHIL NFR BLD: 3.6 %
GFR SERPLBLD CREATININE-BSD FMLA CKD-EPI: 85 ML/MIN/{1.73_M2}
GLUCOSE SERPL-MCNC: 98 MG/DL (ref 70–99)
HCT VFR BLD AUTO: 37.6 % (ref 40.5–52.5)
HGB BLD-MCNC: 12.6 G/DL (ref 13.5–17.5)
LYMPHOCYTES # BLD: 1.9 K/UL (ref 1–5.1)
LYMPHOCYTES NFR BLD: 40.7 %
MCH RBC QN AUTO: 30.1 PG (ref 26–34)
MCHC RBC AUTO-ENTMCNC: 33.6 G/DL (ref 31–36)
MCV RBC AUTO: 89.5 FL (ref 80–100)
MONOCYTES # BLD: 0.4 K/UL (ref 0–1.3)
MONOCYTES NFR BLD: 8.6 %
NEUTROPHILS # BLD: 2.2 K/UL (ref 1.7–7.7)
NEUTROPHILS NFR BLD: 45.8 %
PLATELET # BLD AUTO: 149 K/UL (ref 135–450)
PLATELET BLD QL SMEAR: ADEQUATE
PMV BLD AUTO: 11.1 FL (ref 5–10.5)
POTASSIUM SERPL-SCNC: 3.9 MMOL/L (ref 3.5–5.1)
PROT SERPL-MCNC: 6.9 G/DL (ref 6.4–8.2)
RBC # BLD AUTO: 4.2 M/UL (ref 4.2–5.9)
SLIDE REVIEW: ABNORMAL
SODIUM SERPL-SCNC: 142 MMOL/L (ref 136–145)
URATE SERPL-MCNC: 6.8 MG/DL (ref 3.5–7.2)
WBC # BLD AUTO: 4.7 K/UL (ref 4–11)

## 2024-05-02 ENCOUNTER — OFFICE VISIT (OUTPATIENT)
Dept: ORTHOPEDIC SURGERY | Age: 64
End: 2024-05-02
Payer: COMMERCIAL

## 2024-05-02 VITALS — HEIGHT: 66 IN | BODY MASS INDEX: 24.91 KG/M2 | WEIGHT: 155 LBS

## 2024-05-02 DIAGNOSIS — Z72.0 TOBACCO ABUSE: ICD-10-CM

## 2024-05-02 DIAGNOSIS — Z01.818 PREOP TESTING: ICD-10-CM

## 2024-05-02 DIAGNOSIS — M17.12 ARTHRITIS OF LEFT KNEE: Primary | ICD-10-CM

## 2024-05-02 PROCEDURE — G8419 CALC BMI OUT NRM PARAM NOF/U: HCPCS | Performed by: ORTHOPAEDIC SURGERY

## 2024-05-02 PROCEDURE — 3017F COLORECTAL CA SCREEN DOC REV: CPT | Performed by: ORTHOPAEDIC SURGERY

## 2024-05-02 PROCEDURE — G8427 DOCREV CUR MEDS BY ELIG CLIN: HCPCS | Performed by: ORTHOPAEDIC SURGERY

## 2024-05-02 PROCEDURE — 4004F PT TOBACCO SCREEN RCVD TLK: CPT | Performed by: ORTHOPAEDIC SURGERY

## 2024-05-02 PROCEDURE — 99214 OFFICE O/P EST MOD 30 MIN: CPT | Performed by: ORTHOPAEDIC SURGERY

## 2024-05-02 NOTE — PROGRESS NOTES
postop discussed  Overall 80% improved/satisfied (not necessarily completely pain free), 20% unsatisfied or worse  Factors associated with worse outcomes include preop narcotic usage, tobacco use, unmanaged depression/anxiety, noncompliance, pain out of proportion to radiographic findings, hyperalgesia/central sensitization/pain hypersensitivity/pain catastrophizing, poor coping skills and perseverance, morbid obesity, workers' compensation, etc.  For many patients, the knee does not feel the same or \"natural\", which is typical  His prior anteromedial incision is likely too medial to be incorporated into his TKA incision/approach  Best predictor of postoperative range of motion is preoperative range of motion  Obtaining full extension and flexion early on will be important as the further out from surgery, the harder it will become.  Significant arthrofibrosis on exam today, regaining postoperative range of motion may be more difficult for him, PT will be very important  Baby ASA BID for VTE prophylaxis unless risk factors dictate stronger anticoagulant required such as Eliquis, Xarelto, or Lovenox  Home usually same day    Tobacco use negatively affects general health, but also healing (wound, soft tissue/tendon/ligament, bone/fracture), and is associated with worse pain, worse functional outcomes, and higher complication rate.  Recommend cessation.  Will check cotinine again, must be negative to proceed with elective knee surgery.    I will get him started in physical therapy for prehab  As he lives on the 2nd floor apartment, he will need to work on this prior to surgery so that he can return home postop   Once preop labs are reviewed and okay, and surgical date obtained, I will see him back couple weeks prior to surgery for repeat in-depth discussion      Devan Meza MD

## 2024-05-31 DIAGNOSIS — M25.512 LEFT SHOULDER PAIN, UNSPECIFIED CHRONICITY: ICD-10-CM

## 2024-05-31 DIAGNOSIS — L30.9 DERMATITIS: ICD-10-CM

## 2024-05-31 DIAGNOSIS — J43.8 OTHER EMPHYSEMA (HCC): ICD-10-CM

## 2024-05-31 DIAGNOSIS — N52.8 OTHER MALE ERECTILE DYSFUNCTION: ICD-10-CM

## 2024-05-31 DIAGNOSIS — S46.912A STRAIN OF LEFT SHOULDER, INITIAL ENCOUNTER: ICD-10-CM

## 2024-05-31 DIAGNOSIS — I10 ESSENTIAL HYPERTENSION: ICD-10-CM

## 2024-05-31 DIAGNOSIS — E55.9 VITAMIN D DEFICIENCY: ICD-10-CM

## 2024-05-31 DIAGNOSIS — F17.210 SMOKES CIGARETTES: ICD-10-CM

## 2024-05-31 DIAGNOSIS — I10 HYPERTENSION, UNCONTROLLED: ICD-10-CM

## 2024-06-01 DIAGNOSIS — F17.210 SMOKES CIGARETTES: ICD-10-CM

## 2024-06-01 DIAGNOSIS — I10 HYPERTENSION, UNCONTROLLED: ICD-10-CM

## 2024-06-01 DIAGNOSIS — E55.9 VITAMIN D DEFICIENCY: ICD-10-CM

## 2024-06-03 RX ORDER — NICOTINE 21 MG/24HR
1 PATCH, TRANSDERMAL 24 HOURS TRANSDERMAL DAILY
Qty: 30 PATCH | Refills: 1 | Status: SHIPPED | OUTPATIENT
Start: 2024-06-03

## 2024-06-03 RX ORDER — AMLODIPINE BESYLATE 10 MG/1
10 TABLET ORAL DAILY
Qty: 90 TABLET | Refills: 0 | Status: SHIPPED | OUTPATIENT
Start: 2024-06-03

## 2024-06-03 RX ORDER — TIOTROPIUM BROMIDE 18 UG/1
CAPSULE ORAL; RESPIRATORY (INHALATION)
Qty: 90 CAPSULE | Refills: 0 | Status: SHIPPED | OUTPATIENT
Start: 2024-06-03

## 2024-06-03 RX ORDER — ASPIRIN 81 MG/1
TABLET, CHEWABLE ORAL
Qty: 90 TABLET | Refills: 0 | Status: SHIPPED | OUTPATIENT
Start: 2024-06-03

## 2024-06-03 RX ORDER — ACETAMINOPHEN 160 MG
TABLET,DISINTEGRATING ORAL
Qty: 90 CAPSULE | Refills: 0 | Status: SHIPPED | OUTPATIENT
Start: 2024-06-03

## 2024-06-03 RX ORDER — LABETALOL 300 MG/1
300 TABLET, FILM COATED ORAL DAILY
Qty: 90 TABLET | Refills: 0 | Status: SHIPPED | OUTPATIENT
Start: 2024-06-03

## 2024-06-03 RX ORDER — HYDROCHLOROTHIAZIDE 25 MG/1
25 TABLET ORAL EVERY MORNING
Qty: 90 TABLET | Refills: 0 | Status: SHIPPED | OUTPATIENT
Start: 2024-06-03

## 2024-06-03 RX ORDER — AMMONIUM LACTATE 12 G/100G
LOTION TOPICAL
Qty: 225 G | Refills: 0 | Status: SHIPPED | OUTPATIENT
Start: 2024-06-03

## 2024-06-03 RX ORDER — ALBUTEROL SULFATE 90 UG/1
2 AEROSOL, METERED RESPIRATORY (INHALATION) EVERY 6 HOURS PRN
Qty: 1 EACH | Refills: 1 | Status: SHIPPED | OUTPATIENT
Start: 2024-06-03

## 2024-06-03 RX ORDER — TADALAFIL 5 MG/1
5 TABLET ORAL DAILY PRN
Qty: 90 TABLET | Refills: 0 | Status: SHIPPED | OUTPATIENT
Start: 2024-06-03

## 2024-06-03 RX ORDER — BUPROPION HYDROCHLORIDE 150 MG/1
150 TABLET ORAL EVERY MORNING
Qty: 90 TABLET | Refills: 0 | Status: SHIPPED | OUTPATIENT
Start: 2024-06-03

## 2024-06-03 RX ORDER — NAPROXEN 500 MG/1
500 TABLET ORAL 2 TIMES DAILY WITH MEALS
Qty: 60 TABLET | Refills: 0 | OUTPATIENT
Start: 2024-06-03

## 2024-06-03 NOTE — TELEPHONE ENCOUNTER
Patient last seen 7/28/23 and medication last filled 8/9/23:    Requested Prescriptions     Pending Prescriptions Disp Refills    naproxen (NAPROSYN) 500 MG tablet 60 tablet 0     Sig: Take 1 tablet by mouth 2 times daily (with meals)       Refused. Contact provider

## 2024-06-05 RX ORDER — BUPROPION HYDROCHLORIDE 150 MG/1
150 TABLET ORAL EVERY MORNING
Qty: 30 TABLET | Refills: 1 | OUTPATIENT
Start: 2024-06-05

## 2024-06-05 RX ORDER — AMLODIPINE BESYLATE 10 MG/1
10 TABLET ORAL DAILY
Qty: 90 TABLET | Refills: 0 | OUTPATIENT
Start: 2024-06-05

## 2024-06-05 RX ORDER — ACETAMINOPHEN 160 MG
TABLET,DISINTEGRATING ORAL
Qty: 90 CAPSULE | Refills: 0 | OUTPATIENT
Start: 2024-06-05

## 2024-06-05 NOTE — TELEPHONE ENCOUNTER
Medication:   Requested Prescriptions     Pending Prescriptions Disp Refills    amLODIPine (NORVASC) 10 MG tablet 90 tablet 0     Sig: Take 1 tablet by mouth daily    buPROPion (WELLBUTRIN XL) 150 MG extended release tablet 30 tablet 1     Sig: Take 1 tablet by mouth every morning    Cholecalciferol (VITAMIN D3) 50 MCG (2000 UT) CAPS 90 capsule 0     Sig: TAKE 1 CAPSULE BY MOUTH EVERY DAY      06/03/2024  Last Filled:      Patient Phone Number: [phone] (home)     Last appt: 4/10/2024   Next appt: 7/23/2024    Last OARRS:        No data to display

## 2024-06-12 ENCOUNTER — TELEPHONE (OUTPATIENT)
Dept: ORTHOPEDIC SURGERY | Age: 64
End: 2024-06-12

## 2024-06-12 DIAGNOSIS — Z72.0 TOBACCO ABUSE: ICD-10-CM

## 2024-06-12 DIAGNOSIS — Z01.818 PREOP TESTING: ICD-10-CM

## 2024-06-12 NOTE — TELEPHONE ENCOUNTER
PT says the referral for PT is more than one month old and PT Dept told him it has to be less than one month old to be used to schedule his therapy.   Would you please update that for him and call him so he can schedule.

## 2024-06-12 NOTE — TELEPHONE ENCOUNTER
Called patient -- no answer -- left message PT has been ordered and reminder to have pretesting labs done

## 2024-06-13 NOTE — TELEPHONE ENCOUNTER
General Question     Subject: REQUESTING A CALL ABOUT HIS LABS.  Patient and /or Facility Request: Reyes Overton   Contact Number: [phone]

## 2024-06-13 NOTE — TELEPHONE ENCOUNTER
Called and spoke with patient -- he states it has been over 2 weeks since he stopped smoking -- he will try to go Monday to get his labs done -- informed once labs are back then we will schedule a preop appt with Dr Meza and schedule sx at time of visit  -- patient verbalized understanding

## 2024-06-17 ENCOUNTER — HOSPITAL ENCOUNTER (OUTPATIENT)
Dept: PHYSICAL THERAPY | Age: 64
Setting detail: THERAPIES SERIES
Discharge: HOME | End: 2024-06-17
Attending: ORTHOPAEDIC SURGERY
Payer: COMMERCIAL

## 2024-06-17 DIAGNOSIS — M25.60 LIMITED JOINT RANGE OF MOTION (ROM): ICD-10-CM

## 2024-06-17 DIAGNOSIS — G47.9 DIFFICULTY SLEEPING: ICD-10-CM

## 2024-06-17 DIAGNOSIS — R68.89 DECREASED ACTIVITY TOLERANCE: ICD-10-CM

## 2024-06-17 DIAGNOSIS — Z56.89 DIFFICULTY PERFORMING WORK ACTIVITIES: ICD-10-CM

## 2024-06-17 DIAGNOSIS — R53.1 FUNCTIONAL WEAKNESS: ICD-10-CM

## 2024-06-17 DIAGNOSIS — Z78.9 NEED FOR HOME EXERCISE PROGRAM: ICD-10-CM

## 2024-06-17 DIAGNOSIS — R68.89 DECREASED EXERCISE TOLERANCE: ICD-10-CM

## 2024-06-17 DIAGNOSIS — R68.89 DECREASED ABILITY TO PERFORM ACTIVITIES: ICD-10-CM

## 2024-06-17 DIAGNOSIS — R52 PAIN: Primary | ICD-10-CM

## 2024-06-17 DIAGNOSIS — R26.9 GAIT DIFFICULTY: ICD-10-CM

## 2024-06-17 PROCEDURE — 97110 THERAPEUTIC EXERCISES: CPT

## 2024-06-17 PROCEDURE — 97140 MANUAL THERAPY 1/> REGIONS: CPT

## 2024-06-17 PROCEDURE — 97161 PT EVAL LOW COMPLEX 20 MIN: CPT

## 2024-06-17 NOTE — PLAN OF CARE
[facility]- Outpatient Rehabilitation and Therapy [address], [place] [address] office: [phone] fax: [phone]    Physical Therapy Initial Evaluation Certification      Dear Devan Meza MD,    We had the pleasure of evaluating the following patient for physical therapy services at [facility].  A summary of our findings can be found in the initial assessment below.  This includes our plan of care.  If you have any questions or concerns regarding these findings, please do not hesitate to contact me at the office phone number listed above.  Thank you for the referral.     Physician Signature:_______________________________Date:__________________  By signing above (or electronic signature), therapist’s plan is approved by physician       Physical Therapy: TREATMENT/PROGRESS NOTE   Patient: Reyes Overton (63 y.o. male)   Examination Date: 2024   :  1960 MRN: [id number]   Visit #: 1   Insurance Allowable Auth Needed   caresource []Yes    []No    Insurance: Payor: CARESOURCE / Plan: CARESOURCE [place] MEDICAID / Product Type: *No Product type* /   Insurance ID: [id number] - (Medicaid Managed)  Secondary Insurance (if applicable):    Treatment Diagnosis:     ICD-10-CM    1. Pain  R52       2. Decreased activity tolerance  R68.89       3. Gait difficulty  R26.9       4. Limited joint range of motion (ROM)  M25.60       5. Decreased exercise tolerance  R68.89       6. Difficulty sleeping  G47.9       7. Difficulty performing work activities  Z56.89       8. Decreased ability to perform activities  R68.89       9. Functional weakness  R53.1       10. Need for home exercise program  Z78.9          Medical Diagnosis:  Arthritis of left knee [M17.12]   Referring Physician: Devan Meza MD  PCP: Kajal Amezcua MD       Plan of care signed (Y/N):     Date of Patient follow up with Physician:      Progress Report/POC: EVAL today  POC

## 2024-06-18 LAB
COTININE SERPL-MCNC: 285 NG/ML
NICOTINE SERPL-MCNC: 32 NG/ML

## 2024-06-19 ENCOUNTER — TELEPHONE (OUTPATIENT)
Dept: ORTHOPEDIC SURGERY | Age: 64
End: 2024-06-19

## 2024-06-19 NOTE — TELEPHONE ENCOUNTER
----- Message from Devan Meza MD sent at 6/19/2024 12:30 PM EDT -----    ----- Message -----  From: John Incoming Lab Results From Soft (Epic Adt)  Sent: 6/18/2024  11:10 PM EDT  To: Devan Meza MD

## 2024-06-19 NOTE — TELEPHONE ENCOUNTER
Called patient and LM that nicotine level is too high unable to schedule sx at this time --advised once he has gone 2 weeks without smoking or using any nicotine products to call back and we can check his nicotine level again

## 2024-06-20 ENCOUNTER — TELEPHONE (OUTPATIENT)
Dept: ORTHOPEDIC SURGERY | Age: 64
End: 2024-06-20

## 2024-06-20 ENCOUNTER — HOSPITAL ENCOUNTER (OUTPATIENT)
Dept: PHYSICAL THERAPY | Age: 64
Setting detail: THERAPIES SERIES
Discharge: HOME | End: 2024-06-20
Attending: ORTHOPAEDIC SURGERY
Payer: COMMERCIAL

## 2024-06-20 PROCEDURE — 97140 MANUAL THERAPY 1/> REGIONS: CPT

## 2024-06-20 PROCEDURE — 97110 THERAPEUTIC EXERCISES: CPT

## 2024-06-20 NOTE — FLOWSHEET NOTE
[facility]- Outpatient Rehabilitation and Therapy [address], [place] [address] office: [phone] fax: [phone]        Physical Therapy: TREATMENT/PROGRESS NOTE   Patient: Reyes Overton (63 y.o. male)   Examination Date: 2024   :  1960 MRN: [id number]   Visit #: 2   Insurance Allowable Auth Needed   caresource []Yes    []No    Insurance: Payor: AETNA / Plan: AETNA HMO / Product Type: *No Product type* /   Insurance ID: [id number] - (Commercial)  Secondary Insurance (if applicable): CARESOURCE   Treatment Diagnosis:     ICD-10-CM    1. Pain  R52       2. Decreased activity tolerance  R68.89       3. Gait difficulty  R26.9       4. Limited joint range of motion (ROM)  M25.60       5. Decreased exercise tolerance  R68.89       6. Difficulty sleeping  G47.9       7. Difficulty performing work activities  Z56.89       8. Decreased ability to perform activities  R68.89       9. Functional weakness  R53.1       10. Need for home exercise program  Z78.9          Medical Diagnosis:  Arthritis of left knee [M17.12]   Referring Physician: Devan Meza MD  PCP: Kajal Amezcua MD       Plan of care signed (Y/N):     Date of Patient follow up with Physician:      Progress Report/POC: EVAL today  POC update due: (10 visits /OR AUTH LIMITS, whichever is less)  2024                                             Precautions/ Contra-indications:           Latex allergy:  NO  Pacemaker:    NO  Contraindications for Manipulation: None  Date of Surgery:   Other:    Red Flags:  None    C-SSRS Triggered by Intake questionnaire:   Patient answered 'NO' to both behavioral questions on intake.  No further screening warranted    Preferred Language for Healthcare:   [x] English       [] other:    SUBJECTIVE EXAMINATION     Patient stated complaint: Pt here for prehab eval for L TKA which is planned for the near future. He has an injury in his teens with osteochondral

## 2024-06-20 NOTE — TELEPHONE ENCOUNTER
Called and spoke with patient -- informed him of nicotine results - he will call back once he has gone 2 weeks with any nicotine products and get re-tested -- if negative then I can schedule him

## 2024-06-20 NOTE — TELEPHONE ENCOUNTER
Patient stopped by to say he can not have his labs drawn until he has his surgery moved up?  It's to soon for him to have labs? He was confused.    Please call patient [phone], Thanks!

## 2024-06-25 ENCOUNTER — HOSPITAL ENCOUNTER (OUTPATIENT)
Dept: PHYSICAL THERAPY | Age: 64
Setting detail: THERAPIES SERIES
Discharge: HOME | End: 2024-06-25
Attending: ORTHOPAEDIC SURGERY
Payer: COMMERCIAL

## 2024-06-25 PROCEDURE — 97110 THERAPEUTIC EXERCISES: CPT

## 2024-06-25 PROCEDURE — 97140 MANUAL THERAPY 1/> REGIONS: CPT

## 2024-06-25 NOTE — FLOWSHEET NOTE
[facility]- Outpatient Rehabilitation and Therapy [address], [place] [address] office: [phone] fax: [phone]        Physical Therapy: TREATMENT/PROGRESS NOTE   Patient: Reyes Overton (63 y.o. male)   Examination Date: 2024   :  1960 MRN: [id number]   Visit #: 3   Insurance Allowable Auth Needed   caresource []Yes    []No    Insurance: Payor: AETNA / Plan: AETNA HMO / Product Type: *No Product type* /   Insurance ID: [id number] - (Commercial)  Secondary Insurance (if applicable): CARESOURCE   Treatment Diagnosis:     ICD-10-CM    1. Pain  R52       2. Decreased activity tolerance  R68.89       3. Gait difficulty  R26.9       4. Limited joint range of motion (ROM)  M25.60       5. Decreased exercise tolerance  R68.89       6. Difficulty sleeping  G47.9       7. Difficulty performing work activities  Z56.89       8. Decreased ability to perform activities  R68.89       9. Functional weakness  R53.1       10. Need for home exercise program  Z78.9          Medical Diagnosis:  Arthritis of left knee [M17.12]   Referring Physician: Devan Meza MD  PCP: Kajal Amezcua MD       Plan of care signed (Y/N):     Date of Patient follow up with Physician:      Progress Report/POC: EVAL today  POC update due: (10 visits /OR AUTH LIMITS, whichever is less)  2024                                             Precautions/ Contra-indications:           Latex allergy:  NO  Pacemaker:    NO  Contraindications for Manipulation: None  Date of Surgery:   Other:    Red Flags:  None    C-SSRS Triggered by Intake questionnaire:   Patient answered 'NO' to both behavioral questions on intake.  No further screening warranted    Preferred Language for Healthcare:   [x] English       [] other:  Patient stated complaint: Pt here for prehab eval for L TKA which is planned for the near future. He has an injury in his teens with osteochondral surgery, and has had bad pain for

## 2024-06-27 ENCOUNTER — HOSPITAL ENCOUNTER (OUTPATIENT)
Dept: PHYSICAL THERAPY | Age: 64
Setting detail: THERAPIES SERIES
Discharge: HOME | End: 2024-06-27
Attending: ORTHOPAEDIC SURGERY
Payer: COMMERCIAL

## 2024-06-27 PROCEDURE — 97140 MANUAL THERAPY 1/> REGIONS: CPT

## 2024-06-27 PROCEDURE — 97110 THERAPEUTIC EXERCISES: CPT

## 2024-06-27 NOTE — FLOWSHEET NOTE
[facility]- Outpatient Rehabilitation and Therapy [address], [address], [place] [address] office: [phone] fax: [phone]        Physical Therapy: TREATMENT/PROGRESS NOTE   Patient: Reyes Overton (63 y.o. male)   Examination Date: 2024   :  1960 MRN: [id number]   Visit #: 4   Insurance Allowable Auth Needed   caresource []Yes    []No    Insurance: Payor: AETNA / Plan: AETNA HMO / Product Type: *No Product type* /   Insurance ID: [id number] - (Commercial)  Secondary Insurance (if applicable): CARESOURCE   Treatment Diagnosis:     ICD-10-CM    1. Pain  R52       2. Decreased activity tolerance  R68.89       3. Gait difficulty  R26.9       4. Limited joint range of motion (ROM)  M25.60       5. Decreased exercise tolerance  R68.89       6. Difficulty sleeping  G47.9       7. Difficulty performing work activities  Z56.89       8. Decreased ability to perform activities  R68.89       9. Functional weakness  R53.1       10. Need for home exercise program  Z78.9          Medical Diagnosis:  Arthritis of left knee [M17.12]   Referring Physician: Devan Meza MD  PCP: Kajal Amezcua MD       Plan of care signed (Y/N):     Date of Patient follow up with Physician:      Progress Report/POC: EVAL today  POC update due: (10 visits /OR AUTH LIMITS, whichever is less)  2024                                             Precautions/ Contra-indications:           Latex allergy:  NO  Pacemaker:    NO  Contraindications for Manipulation: None  Date of Surgery:   Other:    Red Flags:  None    C-SSRS Triggered by Intake questionnaire:   Patient answered 'NO' to both behavioral questions on intake.  No further screening warranted    Preferred Language for Healthcare:   [x] English       [] other:  Patient stated complaint: Pt here for prehab eval for L TKA which is planned for the near future. He has an injury in his teens with osteochondral surgery, and has had bad pain for

## 2024-06-28 ENCOUNTER — TELEPHONE (OUTPATIENT)
Dept: CASE MANAGEMENT | Age: 64
End: 2024-06-28

## 2024-06-28 NOTE — TELEPHONE ENCOUNTER
First Lung Cancer Screening Recommendation Reminder Letter mailed to patient.  Smoking history reviewed.

## 2024-07-02 ENCOUNTER — HOSPITAL ENCOUNTER (OUTPATIENT)
Dept: PHYSICAL THERAPY | Age: 64
Setting detail: THERAPIES SERIES
Discharge: HOME | End: 2024-07-02
Attending: ORTHOPAEDIC SURGERY
Payer: COMMERCIAL

## 2024-07-02 PROCEDURE — 97110 THERAPEUTIC EXERCISES: CPT

## 2024-07-02 NOTE — FLOWSHEET NOTE
[facility]- Outpatient Rehabilitation and Therapy [address], [address], [place] [address] office: [phone] fax: [phone]        Physical Therapy: TREATMENT/PROGRESS NOTE   Patient: Reyes Overton (63 y.o. male)   Examination Date: 2024   :  1960 MRN: [id number]   Visit #: 5   Insurance Allowable Auth Needed   caresource []Yes    []No    Insurance: Payor: AETNA / Plan: AETNA HMO / Product Type: *No Product type* /   Insurance ID: [id number] - (Commercial)  Secondary Insurance (if applicable): CARESOURCE   Treatment Diagnosis:     ICD-10-CM    1. Pain  R52       2. Decreased activity tolerance  R68.89       3. Gait difficulty  R26.9       4. Limited joint range of motion (ROM)  M25.60       5. Decreased exercise tolerance  R68.89       6. Difficulty sleeping  G47.9       7. Difficulty performing work activities  Z56.89       8. Decreased ability to perform activities  R68.89       9. Functional weakness  R53.1       10. Need for home exercise program  Z78.9          Medical Diagnosis:  Arthritis of left knee [M17.12]   Referring Physician: Devan Meza MD  PCP: Kajal Amezcua MD       Plan of care signed (Y/N):     Date of Patient follow up with Physician:      Progress Report/POC: EVAL today  POC update due: (10 visits /OR AUTH LIMITS, whichever is less)  2024                                             Precautions/ Contra-indications:           Latex allergy:  NO  Pacemaker:    NO  Contraindications for Manipulation: None  Date of Surgery:   Other:    Red Flags:  None    C-SSRS Triggered by Intake questionnaire:   Patient answered 'NO' to both behavioral questions on intake.  No further screening warranted    Preferred Language for Healthcare:   [x] English       [] other:  Patient stated complaint: Pt here for prehab eval for L TKA which is planned for the near future. He has an injury in his teens with osteochondral surgery, and has had bad pain for

## 2024-07-23 ENCOUNTER — TELEPHONE (OUTPATIENT)
Dept: INTERNAL MEDICINE CLINIC | Age: 64
End: 2024-07-23

## 2024-07-23 ENCOUNTER — OFFICE VISIT (OUTPATIENT)
Dept: INTERNAL MEDICINE CLINIC | Age: 64
End: 2024-07-23
Payer: COMMERCIAL

## 2024-07-23 VITALS
BODY MASS INDEX: 24.86 KG/M2 | WEIGHT: 154 LBS | SYSTOLIC BLOOD PRESSURE: 130 MMHG | HEART RATE: 62 BPM | OXYGEN SATURATION: 97 % | DIASTOLIC BLOOD PRESSURE: 80 MMHG

## 2024-07-23 DIAGNOSIS — E55.9 VITAMIN D DEFICIENCY: ICD-10-CM

## 2024-07-23 DIAGNOSIS — Z87.891 PERSONAL HISTORY OF TOBACCO USE: ICD-10-CM

## 2024-07-23 DIAGNOSIS — M25.562 CHRONIC PAIN OF LEFT KNEE: ICD-10-CM

## 2024-07-23 DIAGNOSIS — I10 PRIMARY HYPERTENSION: ICD-10-CM

## 2024-07-23 DIAGNOSIS — L30.9 DERMATITIS: ICD-10-CM

## 2024-07-23 DIAGNOSIS — J43.8 OTHER EMPHYSEMA (HCC): Primary | ICD-10-CM

## 2024-07-23 DIAGNOSIS — G89.29 CHRONIC PAIN OF LEFT KNEE: ICD-10-CM

## 2024-07-23 PROCEDURE — 3075F SYST BP GE 130 - 139MM HG: CPT | Performed by: INTERNAL MEDICINE

## 2024-07-23 PROCEDURE — G2211 COMPLEX E/M VISIT ADD ON: HCPCS | Performed by: INTERNAL MEDICINE

## 2024-07-23 PROCEDURE — 99214 OFFICE O/P EST MOD 30 MIN: CPT | Performed by: INTERNAL MEDICINE

## 2024-07-23 PROCEDURE — 3079F DIAST BP 80-89 MM HG: CPT | Performed by: INTERNAL MEDICINE

## 2024-07-23 PROCEDURE — G8427 DOCREV CUR MEDS BY ELIG CLIN: HCPCS | Performed by: INTERNAL MEDICINE

## 2024-07-23 PROCEDURE — G0296 VISIT TO DETERM LDCT ELIG: HCPCS | Performed by: INTERNAL MEDICINE

## 2024-07-23 PROCEDURE — 3017F COLORECTAL CA SCREEN DOC REV: CPT | Performed by: INTERNAL MEDICINE

## 2024-07-23 PROCEDURE — 3023F SPIROM DOC REV: CPT | Performed by: INTERNAL MEDICINE

## 2024-07-23 PROCEDURE — G8420 CALC BMI NORM PARAMETERS: HCPCS | Performed by: INTERNAL MEDICINE

## 2024-07-23 PROCEDURE — 1036F TOBACCO NON-USER: CPT | Performed by: INTERNAL MEDICINE

## 2024-07-23 RX ORDER — HYDROCHLOROTHIAZIDE 25 MG/1
25 TABLET ORAL EVERY MORNING
Qty: 90 TABLET | Refills: 0 | Status: SHIPPED | OUTPATIENT
Start: 2024-07-23

## 2024-07-23 RX ORDER — ALBUTEROL SULFATE 90 UG/1
2 AEROSOL, METERED RESPIRATORY (INHALATION) EVERY 6 HOURS PRN
Qty: 1 EACH | Refills: 1 | Status: SHIPPED | OUTPATIENT
Start: 2024-07-23

## 2024-07-23 RX ORDER — TIOTROPIUM BROMIDE 18 UG/1
CAPSULE ORAL; RESPIRATORY (INHALATION)
Qty: 90 CAPSULE | Refills: 0 | Status: SHIPPED | OUTPATIENT
Start: 2024-07-23

## 2024-07-23 RX ORDER — ACETAMINOPHEN 160 MG
TABLET,DISINTEGRATING ORAL
Qty: 90 CAPSULE | Refills: 0 | Status: SHIPPED | OUTPATIENT
Start: 2024-07-23

## 2024-07-23 RX ORDER — LABETALOL 300 MG/1
300 TABLET, FILM COATED ORAL DAILY
Qty: 90 TABLET | Refills: 0 | Status: SHIPPED | OUTPATIENT
Start: 2024-07-23

## 2024-07-23 RX ORDER — AMLODIPINE BESYLATE 10 MG/1
10 TABLET ORAL DAILY
Qty: 90 TABLET | Refills: 0 | Status: SHIPPED | OUTPATIENT
Start: 2024-07-23

## 2024-07-23 NOTE — PROGRESS NOTES
Medicare only covers LDCT screening in patients aged 50-77 with at least a 20 pack-year smoking history who currently smoke or have quit in the last 15 years        MEDICATION SIDE EFFECTS D/W PATIENT      RETURN TO CLINIC WITHIN 3 MONTHS / PRN  PHYSICAL NEXT VISIT      FOLLOW UP FOR CT LUNG

## 2024-08-26 ENCOUNTER — TELEPHONE (OUTPATIENT)
Dept: CASE MANAGEMENT | Age: 64
End: 2024-08-26

## 2024-09-23 ENCOUNTER — TELEPHONE (OUTPATIENT)
Dept: CASE MANAGEMENT | Age: 64
End: 2024-09-23

## 2024-10-23 ENCOUNTER — OFFICE VISIT (OUTPATIENT)
Dept: INTERNAL MEDICINE CLINIC | Age: 64
End: 2024-10-23

## 2024-10-23 VITALS
SYSTOLIC BLOOD PRESSURE: 130 MMHG | OXYGEN SATURATION: 96 % | BODY MASS INDEX: 26.93 KG/M2 | DIASTOLIC BLOOD PRESSURE: 78 MMHG | TEMPERATURE: 98.5 F | HEIGHT: 66 IN | WEIGHT: 167.6 LBS | HEART RATE: 60 BPM

## 2024-10-23 DIAGNOSIS — I10 PRIMARY HYPERTENSION: ICD-10-CM

## 2024-10-23 DIAGNOSIS — H61.21 IMPACTED CERUMEN OF RIGHT EAR: ICD-10-CM

## 2024-10-23 DIAGNOSIS — Z00.00 ANNUAL PHYSICAL EXAM: Primary | ICD-10-CM

## 2024-10-23 DIAGNOSIS — E55.9 VITAMIN D DEFICIENCY: ICD-10-CM

## 2024-10-23 DIAGNOSIS — N52.8 OTHER MALE ERECTILE DYSFUNCTION: ICD-10-CM

## 2024-10-23 DIAGNOSIS — Z23 NEED FOR DIPHTHERIA-TETANUS-PERTUSSIS (TDAP) VACCINE: ICD-10-CM

## 2024-10-23 DIAGNOSIS — J43.8 OTHER EMPHYSEMA (HCC): ICD-10-CM

## 2024-10-23 DIAGNOSIS — F17.210 SMOKES CIGARETTES: ICD-10-CM

## 2024-10-23 DIAGNOSIS — G89.29 CHRONIC PAIN OF LEFT KNEE: ICD-10-CM

## 2024-10-23 DIAGNOSIS — M25.562 CHRONIC PAIN OF LEFT KNEE: ICD-10-CM

## 2024-10-23 LAB
BILIRUBIN, POC: NORMAL
BLOOD URINE, POC: NORMAL
CLARITY, POC: NORMAL
COLOR, POC: NORMAL
GLUCOSE URINE, POC: NORMAL MG/DL
KETONES, POC: NORMAL MG/DL
LEUKOCYTE EST, POC: NORMAL
NITRITE, POC: NORMAL
PH, POC: 6.5
PROTEIN, POC: NORMAL MG/DL
SPECIFIC GRAVITY, POC: 1.02
UROBILINOGEN, POC: 2 MG/DL

## 2024-10-23 RX ORDER — TADALAFIL 5 MG/1
5 TABLET ORAL DAILY PRN
Qty: 90 TABLET | Refills: 0 | Status: SHIPPED | OUTPATIENT
Start: 2024-10-23

## 2024-10-23 RX ORDER — BUPROPION HYDROCHLORIDE 150 MG/1
150 TABLET ORAL EVERY MORNING
Qty: 90 TABLET | Refills: 0 | Status: SHIPPED | OUTPATIENT
Start: 2024-10-23

## 2024-10-23 RX ORDER — AMLODIPINE BESYLATE 10 MG/1
10 TABLET ORAL DAILY
Qty: 90 TABLET | Refills: 0 | Status: SHIPPED | OUTPATIENT
Start: 2024-10-23

## 2024-10-23 RX ORDER — HYDROCHLOROTHIAZIDE 25 MG/1
25 TABLET ORAL EVERY MORNING
Qty: 90 TABLET | Refills: 0 | Status: SHIPPED | OUTPATIENT
Start: 2024-10-23

## 2024-10-23 RX ORDER — ACETAMINOPHEN 160 MG
TABLET,DISINTEGRATING ORAL
Qty: 90 CAPSULE | Refills: 0 | Status: SHIPPED | OUTPATIENT
Start: 2024-10-23

## 2024-10-23 RX ORDER — TIOTROPIUM BROMIDE 18 UG/1
CAPSULE ORAL; RESPIRATORY (INHALATION)
Qty: 90 CAPSULE | Refills: 0 | Status: SHIPPED | OUTPATIENT
Start: 2024-10-23

## 2024-10-23 RX ORDER — LABETALOL 300 MG/1
300 TABLET, FILM COATED ORAL DAILY
Qty: 90 TABLET | Refills: 0 | Status: SHIPPED | OUTPATIENT
Start: 2024-10-23

## 2024-10-23 NOTE — PROGRESS NOTES
SUBJECTIVE:  Reyes Overton is a 64 y.o. male HERE FOR   Chief Complaint   Patient presents with    Annual Exam      PT HERE FOR EVAL / PHYSICAL EXAM    LAST PHYSICAL > 1 YEAR       HTN - TAKING MEDS.  BP NOTED. ? DIET / EXERCISE COMPLIANCE. NO HEADACHE, NO DIZZINESS   EMPHYSEMA -  NO  WHEEZING, NO SOB.  NO INCREASED INHALER USE.  SPIRIVA HELPING  LT KNEE PAIN - PERSISTENT.  SHARP PAIN, NO RAD, PAIN SCALE 10/10 @ MAX. NO SWELLING. NO RAD, NO  REDNESS. DENIES TRAUMA. S/P PHY. THERAPY  VIT D DEF -  TAKING MED. LAB D/W PT  + SMOKER - CESSATION ADVISED. HAS  BEEN SMOKING  ED - REQUESTING MED REFILL  NEEDS TDAP      DENIES CP, NO SOB , No PALPITATIONS. DENIES COUGH, NO F/C  No ABD PAIN, No N/V, No DIARRHEA, No CONSTIPATION, No MELENA, No HEMATOCHEZIA.  No DYSURIA, No FREQ, No URGENCY, No HEMATURIA    PMH: REVIEWED AND UPDATED TODAY    PSH: REVIEWED AND UPDATED TODAY    SOCIAL HX: REVIEWED AND UPDATED TODAY    FAMILY HX: REVIEWED AND UPDATED TODAY    ALLERGY:  Pcn [penicillins]    MEDS: REVIEWED  Prior to Visit Medications    Medication Sig Taking? Authorizing Provider   albuterol sulfate HFA (VENTOLIN HFA) 108 (90 Base) MCG/ACT inhaler Inhale 2 puffs into the lungs every 6 hours as needed for Wheezing or Shortness of Breath Yes Kajal Amezcua MD   amLODIPine (NORVASC) 10 MG tablet Take 1 tablet by mouth daily Yes Kajal Amezcua MD   Cholecalciferol (VITAMIN D3) 50 MCG (2000 UT) CAPS TAKE 1 CAPSULE BY MOUTH EVERY DAY Yes Kajal Amezcua MD   hydroCHLOROthiazide (HYDRODIURIL) 25 MG tablet Take 1 tablet by mouth every morning Yes Kajal Amzecua MD   labetalol (NORMODYNE) 300 MG tablet Take 1 tablet by mouth daily Yes Kajal Amezcua MD   tiotropium (SPIRIVA HANDIHALER) 18 MCG inhalation capsule INHALE THE ENTIRE CONTENTS OF 1 CAPSULE ONCE A DAY USING HANDIHALER DEVICE Yes Kajal Amezcua MD   tadalafil (CIALIS) 5 MG tablet Take 1 tablet by mouth daily as needed for Erectile Dysfunction for erectile

## 2024-10-23 NOTE — PATIENT INSTRUCTIONS
TAKE MED AS ADVISED    DIET/ EXERCISE.    FOLLOW UP WITHIN 4 MONTHS / AS NEEDED    FOLLOW UP FOR FASTING LABS    [facility] Laboratory Locations - No appointment necessary.  ? indicates the location is open Saturdays in addition to Monday through Friday.   Call your preferred location for test preparation, business hours and other information you need.   [facility] accepts all insurances.  CENTRAL  EAST  [place]    ? [place]   [address]   [address]   [place] [address]    Ph: [phone]  [facility]   [address] [place] [address]    Ph: [phone]   ? [place]   [address]    [place] [address]    Ph: [phone]     [facility]   [address] [place] [address]    Ph: [phone] ? [place]   [address] [place] [address]   Ph: [phone]  ? [facility]   [address] [place] [address]    Ph: [phone]      [place]   [address] [place] [address]   Ph: [phone]    NORTH    ? [facility]   [address] [place] [address]    Ph: [phone]  [facility]   [address] [place] [address]   Ph: [phone]  [place]   [address] [place], [place] [address]vd.   [facility], [address]    PH: [phone]    [facility]   [address] [place] [address]    Ph: [phone]  [place]  [address] [place] [address]  Ph: [phone]  [facility]   [address] [place] [address]    Ph: [phone]

## 2024-11-01 ENCOUNTER — APPOINTMENT (OUTPATIENT)
Dept: CT IMAGING | Age: 64
End: 2024-11-01
Payer: COMMERCIAL

## 2024-11-01 ENCOUNTER — HOSPITAL ENCOUNTER (EMERGENCY)
Age: 64
Discharge: HOME | End: 2024-11-02
Attending: EMERGENCY MEDICINE
Payer: COMMERCIAL

## 2024-11-01 DIAGNOSIS — R51.9 NONINTRACTABLE HEADACHE, UNSPECIFIED CHRONICITY PATTERN, UNSPECIFIED HEADACHE TYPE: Primary | ICD-10-CM

## 2024-11-01 LAB
ANION GAP SERPL CALCULATED.3IONS-SCNC: 11 MMOL/L (ref 3–16)
BASOPHILS # BLD: 0.1 K/UL (ref 0–0.2)
BASOPHILS NFR BLD: 1.5 %
BUN SERPL-MCNC: 17 MG/DL (ref 7–20)
CALCIUM SERPL-MCNC: 9.6 MG/DL (ref 8.3–10.6)
CHLORIDE SERPL-SCNC: 106 MMOL/L (ref 99–110)
CO2 SERPL-SCNC: 27 MMOL/L (ref 21–32)
CREAT SERPL-MCNC: 1.1 MG/DL (ref 0.8–1.3)
DEPRECATED RDW RBC AUTO: 14.8 % (ref 12.4–15.4)
EOSINOPHIL # BLD: 0.2 K/UL (ref 0–0.6)
EOSINOPHIL NFR BLD: 3.9 %
GFR SERPLBLD CREATININE-BSD FMLA CKD-EPI: 76 ML/MIN/{1.73_M2}
GLUCOSE SERPL-MCNC: 111 MG/DL (ref 70–99)
HCT VFR BLD AUTO: 39.9 % (ref 40.5–52.5)
HGB BLD-MCNC: 13.2 G/DL (ref 13.5–17.5)
LYMPHOCYTES # BLD: 2 K/UL (ref 1–5.1)
LYMPHOCYTES NFR BLD: 40.1 %
MCH RBC QN AUTO: 29.9 PG (ref 26–34)
MCHC RBC AUTO-ENTMCNC: 33 G/DL (ref 31–36)
MCV RBC AUTO: 90.3 FL (ref 80–100)
MONOCYTES # BLD: 0.5 K/UL (ref 0–1.3)
MONOCYTES NFR BLD: 10.9 %
NEUTROPHILS # BLD: 2.2 K/UL (ref 1.7–7.7)
NEUTROPHILS NFR BLD: 43.6 %
PLATELET # BLD AUTO: 202 K/UL (ref 135–450)
PMV BLD AUTO: 11.1 FL (ref 5–10.5)
POTASSIUM SERPL-SCNC: 3.7 MMOL/L (ref 3.5–5.1)
RBC # BLD AUTO: 4.41 M/UL (ref 4.2–5.9)
SODIUM SERPL-SCNC: 144 MMOL/L (ref 136–145)
WBC # BLD AUTO: 5 K/UL (ref 4–11)

## 2024-11-01 PROCEDURE — 85025 COMPLETE CBC W/AUTO DIFF WBC: CPT

## 2024-11-01 PROCEDURE — 36415 COLL VENOUS BLD VENIPUNCTURE: CPT

## 2024-11-01 PROCEDURE — 99285 EMERGENCY DEPT VISIT HI MDM: CPT

## 2024-11-01 PROCEDURE — 70450 CT HEAD/BRAIN W/O DYE: CPT

## 2024-11-01 PROCEDURE — 70498 CT ANGIOGRAPHY NECK: CPT

## 2024-11-01 PROCEDURE — 6360000004 HC RX CONTRAST MEDICATION: Performed by: PHYSICIAN ASSISTANT

## 2024-11-01 PROCEDURE — 80048 BASIC METABOLIC PNL TOTAL CA: CPT

## 2024-11-01 RX ORDER — IOPAMIDOL 755 MG/ML
75 INJECTION, SOLUTION INTRAVASCULAR
Status: COMPLETED | OUTPATIENT
Start: 2024-11-01 | End: 2024-11-01

## 2024-11-01 RX ADMIN — IOPAMIDOL 75 ML: 755 INJECTION, SOLUTION INTRAVENOUS at 20:56

## 2024-11-01 ASSESSMENT — PAIN DESCRIPTION - LOCATION: LOCATION: HEAD

## 2024-11-01 ASSESSMENT — PAIN DESCRIPTION - FREQUENCY: FREQUENCY: INTERMITTENT

## 2024-11-01 ASSESSMENT — PAIN SCALES - GENERAL: PAINLEVEL_OUTOF10: 10

## 2024-11-01 ASSESSMENT — PAIN DESCRIPTION - DESCRIPTORS: DESCRIPTORS: ACHING;SQUEEZING;THROBBING

## 2024-11-01 ASSESSMENT — PAIN - FUNCTIONAL ASSESSMENT: PAIN_FUNCTIONAL_ASSESSMENT: 0-10

## 2024-11-01 ASSESSMENT — PAIN DESCRIPTION - PAIN TYPE: TYPE: ACUTE PAIN

## 2024-11-01 NOTE — ED PROVIDER NOTES
5\")        Patient was given the following medications:  Medications   iopamidol (ISOVUE-370) 76 % injection 75 mL (75 mLs IntraVENous Given 11/1/24 2056)   metoclopramide (REGLAN) injection 10 mg (10 mg IntraVENous Given 11/2/24 0254)   diphenhydrAMINE (BENADRYL) injection 25 mg (25 mg IntraVENous Given 11/2/24 0254)   acetaminophen (TYLENOL) tablet 650 mg (650 mg Oral Given 11/2/24 0253)                 Is this patient to be included in the SEP-1 Core Measure due to severe sepsis or septic shock?   No   Exclusion criteria - the patient is NOT to be included for SEP-1 Core Measure due to:  Infection is not suspected    Chronic Conditions affecting care: Aneurysm, intracranial bleed, HTN, subdural hematoma   has a past medical history of Aneurysm (HCC), Arthritis, Cerebral artery occlusion with cerebral infarction (HCC), Hypertension, Smokes cigarettes, and Subdural hematoma.    CONSULTS: (Who and What was discussed)  None    Records Reviewed (External and Source) none    CC/HPI Summary, DDx, ED Course, and Reassessment:     My shift ends and the patient CT scan has not resulted.  I then discussed case with attending physician who will assume management and final disposition.    Disposition Considerations (tests considered but not done, Admit vs D/C, Shared Decision Making, Pt Expectation of Test or Tx.):     My shift ends and I have spoke with my attending physician will assume management of final disposition.      I am the Primary Clinician of Record.  FINAL IMPRESSION      1. Nonintractable headache, unspecified chronicity pattern, unspecified headache type          DISPOSITION/PLAN     DISPOSITION Decision To Discharge 11/02/2024 03:24:18 AM           PATIENT REFERRED TO:  Kajal Amezcua MD  [address]  [facility] [address] [phone]    Schedule an appointment as soon as possible for a visit   As needed      DISCHARGE MEDICATIONS:  Discharge Medication List as of 11/2/2024  3:28 AM         START taking these medications    Details   !! acetaminophen (TYLENOL) 500 MG tablet Take 1 tablet by mouth 4 times daily as needed for Pain, Disp-120 tablet, R-0Normal      metoclopramide (REGLAN) 10 MG tablet Take 1 tablet by mouth 4 times daily WARNING:  May cause drowsiness.  May impair ability to operate vehicles or machinery.  Do not use in combination with alcohol., Disp-20 tablet, R-0Normal      diphenhydrAMINE (BENADRYL) 25 MG capsule Take 1 capsule by mouth every 4 hours as needed for Itching, Disp-25 capsule, R-0Normal       !! - Potential duplicate medications found. Please discuss with provider.          DISCONTINUED MEDICATIONS:  Discharge Medication List as of 11/2/2024  3:28 AM        STOP taking these medications       meloxicam (MOBIC) 7.5 MG tablet Comments:   Reason for Stopping:                      (Please note that portions of this note were completed with a voice recognition program.  Efforts were made to edit the dictations but occasionally words are mis-transcribed.)    Daniel Pool PA-C (electronically signed)        Daniel Pool PA-C  11/02/24 7833

## 2024-11-02 VITALS
BODY MASS INDEX: 28.03 KG/M2 | HEART RATE: 61 BPM | HEIGHT: 65 IN | RESPIRATION RATE: 18 BRPM | WEIGHT: 168.21 LBS | OXYGEN SATURATION: 99 % | SYSTOLIC BLOOD PRESSURE: 169 MMHG | TEMPERATURE: 98.4 F | DIASTOLIC BLOOD PRESSURE: 99 MMHG

## 2024-11-02 PROCEDURE — 96374 THER/PROPH/DIAG INJ IV PUSH: CPT

## 2024-11-02 PROCEDURE — 96375 TX/PRO/DX INJ NEW DRUG ADDON: CPT

## 2024-11-02 PROCEDURE — 6370000000 HC RX 637 (ALT 250 FOR IP): Performed by: EMERGENCY MEDICINE

## 2024-11-02 PROCEDURE — 6360000002 HC RX W HCPCS: Performed by: EMERGENCY MEDICINE

## 2024-11-02 RX ORDER — METOCLOPRAMIDE HYDROCHLORIDE 5 MG/ML
10 INJECTION INTRAMUSCULAR; INTRAVENOUS ONCE
Status: COMPLETED | OUTPATIENT
Start: 2024-11-02 | End: 2024-11-02

## 2024-11-02 RX ORDER — ACETAMINOPHEN 325 MG/1
650 TABLET ORAL ONCE
Status: COMPLETED | OUTPATIENT
Start: 2024-11-02 | End: 2024-11-02

## 2024-11-02 RX ORDER — DIPHENHYDRAMINE HCL 25 MG
25 CAPSULE ORAL EVERY 4 HOURS PRN
Qty: 25 CAPSULE | Refills: 0 | Status: SHIPPED | OUTPATIENT
Start: 2024-11-02 | End: 2024-11-12

## 2024-11-02 RX ORDER — METOCLOPRAMIDE 10 MG/1
10 TABLET ORAL 4 TIMES DAILY
Qty: 20 TABLET | Refills: 0 | Status: SHIPPED | OUTPATIENT
Start: 2024-11-02

## 2024-11-02 RX ORDER — ACETAMINOPHEN 500 MG
500 TABLET ORAL 4 TIMES DAILY PRN
Qty: 120 TABLET | Refills: 0 | Status: SHIPPED | OUTPATIENT
Start: 2024-11-02

## 2024-11-02 RX ORDER — DIPHENHYDRAMINE HYDROCHLORIDE 50 MG/ML
25 INJECTION INTRAMUSCULAR; INTRAVENOUS ONCE
Status: COMPLETED | OUTPATIENT
Start: 2024-11-02 | End: 2024-11-02

## 2024-11-02 RX ADMIN — DIPHENHYDRAMINE HYDROCHLORIDE 25 MG: 50 INJECTION INTRAMUSCULAR; INTRAVENOUS at 02:54

## 2024-11-02 RX ADMIN — ACETAMINOPHEN 325MG 650 MG: 325 TABLET ORAL at 02:53

## 2024-11-02 RX ADMIN — METOCLOPRAMIDE HYDROCHLORIDE 10 MG: 5 INJECTION, SOLUTION INTRAMUSCULAR; INTRAVENOUS at 02:54

## 2024-11-02 ASSESSMENT — PAIN SCALES - GENERAL
PAINLEVEL_OUTOF10: 10
PAINLEVEL_OUTOF10: 4

## 2024-11-02 ASSESSMENT — PAIN DESCRIPTION - LOCATION: LOCATION: HEAD

## 2024-11-02 ASSESSMENT — PAIN - FUNCTIONAL ASSESSMENT: PAIN_FUNCTIONAL_ASSESSMENT: 0-10

## 2024-11-05 ENCOUNTER — HOSPITAL ENCOUNTER (OUTPATIENT)
Dept: CT IMAGING | Age: 64
Discharge: HOME | End: 2024-11-05
Payer: COMMERCIAL

## 2024-11-05 DIAGNOSIS — Z87.891 PERSONAL HISTORY OF TOBACCO USE: ICD-10-CM

## 2024-11-05 PROCEDURE — 71271 CT THORAX LUNG CANCER SCR C-: CPT

## 2024-11-07 ENCOUNTER — TELEPHONE (OUTPATIENT)
Dept: INTERNAL MEDICINE CLINIC | Age: 64
End: 2024-11-07

## 2024-11-07 DIAGNOSIS — H61.21 IMPACTED CERUMEN OF RIGHT EAR: ICD-10-CM

## 2024-11-07 NOTE — TELEPHONE ENCOUNTER
Patient states he left ear drops on bus that provider prescribed for him. Requesting another prescription please be sent to pharmacy. Please advise.

## 2024-11-18 DIAGNOSIS — E55.9 VITAMIN D DEFICIENCY: ICD-10-CM

## 2024-11-18 DIAGNOSIS — I10 PRIMARY HYPERTENSION: ICD-10-CM

## 2024-11-18 DIAGNOSIS — Z00.00 ANNUAL PHYSICAL EXAM: ICD-10-CM

## 2024-11-18 LAB
25(OH)D3 SERPL-MCNC: 34.3 NG/ML
ALBUMIN SERPL-MCNC: 4.8 G/DL (ref 3.4–5)
ALBUMIN/GLOB SERPL: 1.6 {RATIO} (ref 1.1–2.2)
ALP SERPL-CCNC: 99 U/L (ref 40–129)
ALT SERPL-CCNC: 36 U/L (ref 10–40)
ANION GAP SERPL CALCULATED.3IONS-SCNC: 11 MMOL/L (ref 3–16)
AST SERPL-CCNC: 31 U/L (ref 15–37)
BASOPHILS # BLD: 0.1 K/UL (ref 0–0.2)
BASOPHILS NFR BLD: 1 %
BILIRUB SERPL-MCNC: 0.3 MG/DL (ref 0–1)
BUN SERPL-MCNC: 19 MG/DL (ref 7–20)
CALCIUM SERPL-MCNC: 9.9 MG/DL (ref 8.3–10.6)
CHLORIDE SERPL-SCNC: 107 MMOL/L (ref 99–110)
CHOLEST SERPL-MCNC: 221 MG/DL (ref 0–199)
CO2 SERPL-SCNC: 24 MMOL/L (ref 21–32)
CREAT SERPL-MCNC: 1 MG/DL (ref 0.8–1.3)
CREAT UR-MCNC: 194 MG/DL (ref 39–259)
DEPRECATED RDW RBC AUTO: 15.1 % (ref 12.4–15.4)
EOSINOPHIL # BLD: 0.2 K/UL (ref 0–0.6)
EOSINOPHIL NFR BLD: 3 %
EST. AVERAGE GLUCOSE BLD GHB EST-MCNC: 88.2 MG/DL
GFR SERPLBLD CREATININE-BSD FMLA CKD-EPI: 85 ML/MIN/{1.73_M2}
GLUCOSE SERPL-MCNC: 103 MG/DL (ref 70–99)
HBA1C MFR BLD: 4.7 %
HCT VFR BLD AUTO: 41.1 % (ref 40.5–52.5)
HDLC SERPL-MCNC: 53 MG/DL (ref 40–60)
HGB BLD-MCNC: 13 G/DL (ref 13.5–17.5)
LDLC SERPL CALC-MCNC: 139 MG/DL
LYMPHOCYTES # BLD: 1.9 K/UL (ref 1–5.1)
LYMPHOCYTES NFR BLD: 30.8 %
MCH RBC QN AUTO: 28.9 PG (ref 26–34)
MCHC RBC AUTO-ENTMCNC: 31.6 G/DL (ref 31–36)
MCV RBC AUTO: 91.4 FL (ref 80–100)
MICROALBUMIN UR DL<=1MG/L-MCNC: 2.32 MG/DL
MICROALBUMIN/CREAT UR: 12 MG/G (ref 0–30)
MONOCYTES # BLD: 0.5 K/UL (ref 0–1.3)
MONOCYTES NFR BLD: 7.9 %
NEUTROPHILS # BLD: 3.6 K/UL (ref 1.7–7.7)
NEUTROPHILS NFR BLD: 57.3 %
PLATELET # BLD AUTO: 194 K/UL (ref 135–450)
PMV BLD AUTO: 11.4 FL (ref 5–10.5)
POTASSIUM SERPL-SCNC: 3.7 MMOL/L (ref 3.5–5.1)
PROT SERPL-MCNC: 7.8 G/DL (ref 6.4–8.2)
PSA SERPL DL<=0.01 NG/ML-MCNC: 0.4 NG/ML (ref 0–4)
RBC # BLD AUTO: 4.5 M/UL (ref 4.2–5.9)
SODIUM SERPL-SCNC: 142 MMOL/L (ref 136–145)
TRIGL SERPL-MCNC: 147 MG/DL (ref 0–150)
TSH SERPL DL<=0.005 MIU/L-ACNC: 0.85 UIU/ML (ref 0.27–4.2)
VLDLC SERPL CALC-MCNC: 29 MG/DL
WBC # BLD AUTO: 6.2 K/UL (ref 4–11)

## 2024-11-19 ENCOUNTER — OFFICE VISIT (OUTPATIENT)
Dept: INTERNAL MEDICINE CLINIC | Age: 64
End: 2024-11-19

## 2024-11-19 VITALS
SYSTOLIC BLOOD PRESSURE: 126 MMHG | HEART RATE: 60 BPM | OXYGEN SATURATION: 97 % | BODY MASS INDEX: 27.96 KG/M2 | DIASTOLIC BLOOD PRESSURE: 80 MMHG | WEIGHT: 168 LBS

## 2024-11-19 DIAGNOSIS — M25.562 CHRONIC PAIN OF LEFT KNEE: ICD-10-CM

## 2024-11-19 DIAGNOSIS — N52.8 OTHER MALE ERECTILE DYSFUNCTION: ICD-10-CM

## 2024-11-19 DIAGNOSIS — J43.8 OTHER EMPHYSEMA (HCC): ICD-10-CM

## 2024-11-19 DIAGNOSIS — F17.210 SMOKES CIGARETTES: ICD-10-CM

## 2024-11-19 DIAGNOSIS — E55.9 VITAMIN D DEFICIENCY: ICD-10-CM

## 2024-11-19 DIAGNOSIS — E78.2 MIXED HYPERLIPIDEMIA: Primary | ICD-10-CM

## 2024-11-19 DIAGNOSIS — I10 PRIMARY HYPERTENSION: ICD-10-CM

## 2024-11-19 DIAGNOSIS — G89.29 CHRONIC PAIN OF LEFT KNEE: ICD-10-CM

## 2024-11-19 RX ORDER — NAPROXEN 500 MG/1
500 TABLET ORAL 2 TIMES DAILY PRN
Qty: 60 TABLET | Refills: 0 | Status: SHIPPED | OUTPATIENT
Start: 2024-11-19

## 2024-11-19 RX ORDER — TIOTROPIUM BROMIDE 18 UG/1
CAPSULE ORAL; RESPIRATORY (INHALATION)
Qty: 90 CAPSULE | Refills: 0 | Status: SHIPPED | OUTPATIENT
Start: 2024-11-19

## 2024-11-19 RX ORDER — AMLODIPINE BESYLATE 10 MG/1
10 TABLET ORAL DAILY
Qty: 90 TABLET | Refills: 0 | Status: SHIPPED | OUTPATIENT
Start: 2024-11-19

## 2024-11-19 RX ORDER — AMMONIUM LACTATE 12 G/100G
LOTION TOPICAL
Qty: 225 G | Refills: 0 | Status: SHIPPED | OUTPATIENT
Start: 2024-11-19

## 2024-11-19 RX ORDER — LABETALOL 300 MG/1
300 TABLET, FILM COATED ORAL DAILY
Qty: 90 TABLET | Refills: 0 | Status: SHIPPED | OUTPATIENT
Start: 2024-11-19

## 2024-11-19 RX ORDER — HYDROCHLOROTHIAZIDE 25 MG/1
25 TABLET ORAL EVERY MORNING
Qty: 90 TABLET | Refills: 0 | Status: SHIPPED | OUTPATIENT
Start: 2024-11-19

## 2024-11-19 NOTE — PATIENT INSTRUCTIONS
TAKE MED AS ADVISED    DIET/ EXERCISE.    FOLLOW UP WITHIN 4 MONTHS / AS NEEDED    FOLLOW UP FOR FASTING LABS      [facility] Locations - No appointment necessary.  ? indicates the location is open Saturdays in addition to Monday through Friday.   Call your preferred location for test preparation, business hours and other information you need.   [facility] accepts all insurances.  CENTRAL  EAST  [place]    ? [place]   [address]   [address]   [place] [address]    Ph: [phone]  [facility]   [address] [place] [address]    Ph: [phone]   ? [place]   [address]    [place] [address]    Ph: [phone]     [facility]   [address] [place] [address]    Ph: [phone] ? [place]   [address] [place] [address]   Ph: [phone]  ? [facility]   [address] [place] [address]    Ph: [phone]      [place]   [address] [place] [address]   Ph: [phone]    NORTH    ? [facility]   [address] [place] [address]    Ph: [phone]  [facility]   [address] [place] [address]   Ph: [phone]  [place]   [address] [place], [place] [address].   [facility], [address]    PH: [phone]    [facility]   [address] [place] [address]    Ph: [phone]  [place]  [address] [place] [address]  Ph: [phone]  [facility]   [address] [place] [address]    Ph: [phone]

## 2024-11-19 NOTE — PROGRESS NOTES
MENINGEAL SIGNS, NO TREMORS, AMBULATING WITH A LIMP OTHERWISE, NO FOCAL DEFICITS  PSYCH: FAIRLY GOOD AFFECT  BACK: NT, NO ROM, NO CVA TENDERNESS  KNEE: NT, + PAIN WITH MVT - LT. OCC ROM      PREVIOUS LABS REVIEWED AND D/W PT    ASSESSMENT / PLAN:     Diagnosis Orders   1. Mixed hyperlipidemia  COUNSELLED. NEW ONSET. DEFERRED MED  ADVISED LOW FAT / CHOL DIET/ EXERCISE.  MONITOR. F/U REPEAT LABS  GOALS D/W PT.  MAKE CHANGES AS NEEDED.       2. Primary hypertension  COUNSELLED. REPEAT BP AS ABOVE. CONTROLLED. CONTINUE MEDS   ADVISED LOW NA+ / DASH DIET/ EXERCISE. MONITOR. GOAL </= 130/80  F/U LABS  MAKE CHANGES AS NEEDED.        3. Other emphysema (HCC)  COUNSELLED. CONTROLLED. CONTINUE SPIRIVA. ALBUTEROL PRN. MONITOR.  ADVISED SMOKING CESSATION.  MAKE CHANGES AS NEEDED.       4. Chronic pain of left knee  COUNSELLED. ? OA. EXERCISES. ANALGESICS PRN. MONITOR.  NAPROSYN 500 MG  PRN WITH FOOD  ADVISED ON HOME EXERCISES  MAKE CHANGES AS NEEDED.       5. Vitamin D deficiency  COUNSELLED. CONTROLLED. MONITOR ON VIT D SUPPLEMENT.  MAKE CHANGES AS NEEDED.       6. Other male erectile dysfunction  COUNSELLED. MED PRN - S/E D/W PT  MONITOR. MAKE CHANGES AS NEEDED.       7. Smokes cigarettes  Smoking cessation was encouraged. Cessation techniques reviewed today.  COUNSELLED. CESSATION ADVISED   COMPLICATIONS OF TOBACCO USE INCLUDING COPD, CANCER, CAD D/W PT  PT VERBALIZED UNDERSTANDING  MAKE CHANGES AS NEEDED.                       MEDICATION SIDE EFFECTS D/W PATIENT      RETURN TO CLINIC WITHIN 4 MONTHS / PRN    FOLLOW UP FOR FASTING LABS

## 2025-01-20 ENCOUNTER — OFFICE VISIT (OUTPATIENT)
Dept: ORTHOPEDIC SURGERY | Age: 65
End: 2025-01-20
Payer: COMMERCIAL

## 2025-01-20 VITALS — BODY MASS INDEX: 27.99 KG/M2 | HEIGHT: 65 IN | WEIGHT: 168 LBS

## 2025-01-20 DIAGNOSIS — M17.12 ARTHRITIS OF LEFT KNEE: Primary | ICD-10-CM

## 2025-01-20 DIAGNOSIS — F12.90 MARIJUANA USE: ICD-10-CM

## 2025-01-20 DIAGNOSIS — Z72.0 TOBACCO ABUSE: ICD-10-CM

## 2025-01-20 PROCEDURE — G8419 CALC BMI OUT NRM PARAM NOF/U: HCPCS | Performed by: ORTHOPAEDIC SURGERY

## 2025-01-20 PROCEDURE — G8427 DOCREV CUR MEDS BY ELIG CLIN: HCPCS | Performed by: ORTHOPAEDIC SURGERY

## 2025-01-20 PROCEDURE — E0100 CANE ADJUST/FIXED WITH TIP: HCPCS | Performed by: ORTHOPAEDIC SURGERY

## 2025-01-20 PROCEDURE — 3017F COLORECTAL CA SCREEN DOC REV: CPT | Performed by: ORTHOPAEDIC SURGERY

## 2025-01-20 PROCEDURE — 99214 OFFICE O/P EST MOD 30 MIN: CPT | Performed by: ORTHOPAEDIC SURGERY

## 2025-01-20 PROCEDURE — 1036F TOBACCO NON-USER: CPT | Performed by: ORTHOPAEDIC SURGERY

## 2025-01-20 NOTE — PROGRESS NOTES
ORTHOPAEDIC OFFICE NOTE    Chief Complaint   Patient presents with    Follow-up     Fu left knee       HPI  1/20/25  Reyes returns to clinic today for follow-up of his left knee  We previously discussed total knee arthroplasty, however the patient was still smoking  He reports he has gone a week without smoking  He did some physical therapy back in June and July of last year  The pain is described as severe, 10 out of 10  Pain medial  He is using Tylenol and doing soaks  He is interested in proceeding with total knee arthroplasty      5/2/24  Reyes returns to clinic today for follow-up of his left knee  He reports persistent pain, rated 10 out of 10  Diffuse, but worse medially  He wishes to proceed with total knee arthroplasty  He reports he stopped smoking 2 weeks ago      7/20/23  Mr. Overton returns to clinic today for follow-up of his left knee  I last saw him over a year ago and discussed total knee arthroplasty  However, we discussed the importance of complete tobacco cessation prior to surgical intervention  The patient saw Dr. 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 male seen in consultation at the request of Daniel Russell MD for evaluation of left knee pain:  Onset chronic/years  Injury/trauma none  History of symptoms - history of left knee open OCD repair when he was 18 to 19 years old  Pain is located anteromedial  Worse with activity, prolonged WB, ROM  Better with rest  Associated with swelling  Works in maintenance  Smokes half a pack per day      Review of Systems  ROS from the Patient History Form dated on 3/21/22  Pertinent positives include peripheral edema, stroke  Rest of

## 2025-01-24 ENCOUNTER — HOSPITAL ENCOUNTER (OUTPATIENT)
Dept: PHYSICAL THERAPY | Age: 65
Setting detail: THERAPIES SERIES
Discharge: HOME | End: 2025-01-24
Attending: ORTHOPAEDIC SURGERY
Payer: COMMERCIAL

## 2025-01-24 PROCEDURE — 97110 THERAPEUTIC EXERCISES: CPT

## 2025-01-24 PROCEDURE — 97164 PT RE-EVAL EST PLAN CARE: CPT

## 2025-01-24 NOTE — FLOWSHEET NOTE
[facility]- Outpatient Rehabilitation and Therapy [address], [address], [place] [address] office: [phone] fax: [phone]        Physical Therapy: TREATMENT/PROGRESS NOTE   Patient: Reyes Overton (64 y.o. male)   Examination Date: 2025   :  1960 MRN: [id number]   Visit #: 6   Insurance Allowable Auth Needed   caresource []Yes    []No    Insurance: Payor: CARESOURCE / Plan: CARESOURCE [place] MEDICAID / Product Type: *No Product type* /   Insurance ID: [id number] - (Medicaid Managed)  Secondary Insurance (if applicable):    Treatment Diagnosis:     ICD-10-CM    1. Pain  R52       2. Decreased activity tolerance  R68.89       3. Gait difficulty  R26.9       4. Limited joint range of motion (ROM)  M25.60       5. Decreased exercise tolerance  R68.89       6. Difficulty sleeping  G47.9       7. Difficulty performing work activities  Z56.89       8. Decreased ability to perform activities  R68.89       9. Functional weakness  R53.1       10. Need for home exercise program  Z78.9          Medical Diagnosis:  Arthritis of left knee [M17.12]   Referring Physician: Devan Meza MD  PCP: Kajal Amezcua MD       Plan of care signed (Y/N):     Date of Patient follow up with Physician:      Progress Report/POC: EVAL today  POC update due: (10 visits /OR AUTH LIMITS, whichever is less)  2025                                             Precautions/ Contra-indications:           Latex allergy:  NO  Pacemaker:    NO  Contraindications for Manipulation: None  Date of Surgery:   Other:    Red Flags:  None    C-SSRS Triggered by Intake questionnaire:   Patient answered 'NO' to both behavioral questions on intake.  No further screening warranted    Preferred Language for Healthcare:   [x] English       [] other:  Patient stated complaint: Pt here for prehab eval for L TKA which is planned for the near future. He has an injury in his teens with osteochondral surgery, and has had

## 2025-01-31 ENCOUNTER — APPOINTMENT (OUTPATIENT)
Dept: PHYSICAL THERAPY | Age: 65
End: 2025-01-31
Attending: ORTHOPAEDIC SURGERY
Payer: COMMERCIAL

## 2025-02-07 ENCOUNTER — HOSPITAL ENCOUNTER (OUTPATIENT)
Dept: PHYSICAL THERAPY | Age: 65
Setting detail: THERAPIES SERIES
Discharge: HOME | End: 2025-02-07
Attending: ORTHOPAEDIC SURGERY
Payer: COMMERCIAL

## 2025-02-07 PROCEDURE — 97140 MANUAL THERAPY 1/> REGIONS: CPT

## 2025-02-07 PROCEDURE — 97110 THERAPEUTIC EXERCISES: CPT

## 2025-02-07 NOTE — FLOWSHEET NOTE
[facility]- Outpatient Rehabilitation and Therapy [address], [address], [place] [address] office: [phone] fax: [phone]        Physical Therapy: TREATMENT/PROGRESS NOTE   Patient: Reyes Overton (64 y.o. male)   Examination Date: 2025   :  1960 MRN: [id number]   Visit #: 2   Insurance Allowable Auth Needed   caresource []Yes    []No    Insurance: Payor: CARESOURCE / Plan: CARESOURCE [place] MEDICAID / Product Type: *No Product type* /   Insurance ID: [id number] - (Medicaid Managed)  Secondary Insurance (if applicable):    Treatment Diagnosis:     ICD-10-CM    1. Pain  R52       2. Decreased activity tolerance  R68.89       3. Gait difficulty  R26.9       4. Limited joint range of motion (ROM)  M25.60       5. Decreased exercise tolerance  R68.89       6. Difficulty sleeping  G47.9       7. Difficulty performing work activities  Z56.89       8. Decreased ability to perform activities  R68.89       9. Functional weakness  R53.1       10. Need for home exercise program  Z78.9          Medical Diagnosis:  Arthritis of left knee [M17.12]   Referring Physician: Devan Meza MD  PCP: Kajal Amezcua MD       Plan of care signed (Y/N):     Date of Patient follow up with Physician:      Progress Report/POC: EVAL today  POC update due: (10 visits /OR AUTH LIMITS, whichever is less)  3/7/2025                                             Precautions/ Contra-indications:           Latex allergy:  NO  Pacemaker:    NO  Contraindications for Manipulation: None  Date of Surgery:   Other:    Red Flags:  None    C-SSRS Triggered by Intake questionnaire:   Patient answered 'NO' to both behavioral questions on intake.  No further screening warranted    Preferred Language for Healthcare:   [x] English       [] other:  Patient stated complaint: Pt here for prehab eval for L TKA which is planned for the near future. He has an injury in his teens with osteochondral surgery, and has had

## 2025-02-14 ENCOUNTER — HOSPITAL ENCOUNTER (OUTPATIENT)
Dept: PHYSICAL THERAPY | Age: 65
Setting detail: THERAPIES SERIES
End: 2025-02-14
Attending: ORTHOPAEDIC SURGERY
Payer: COMMERCIAL

## 2025-02-21 ENCOUNTER — APPOINTMENT (OUTPATIENT)
Dept: PHYSICAL THERAPY | Age: 65
End: 2025-02-21
Attending: ORTHOPAEDIC SURGERY
Payer: COMMERCIAL

## 2025-02-24 ENCOUNTER — HOSPITAL ENCOUNTER (OUTPATIENT)
Dept: PHYSICAL THERAPY | Age: 65
Setting detail: THERAPIES SERIES
End: 2025-02-24
Attending: ORTHOPAEDIC SURGERY
Payer: COMMERCIAL

## 2025-02-28 ENCOUNTER — APPOINTMENT (OUTPATIENT)
Dept: PHYSICAL THERAPY | Age: 65
End: 2025-02-28
Attending: ORTHOPAEDIC SURGERY
Payer: COMMERCIAL

## 2025-03-19 ENCOUNTER — OFFICE VISIT (OUTPATIENT)
Dept: INTERNAL MEDICINE CLINIC | Age: 65
End: 2025-03-19
Payer: COMMERCIAL

## 2025-03-19 VITALS
WEIGHT: 161 LBS | OXYGEN SATURATION: 97 % | SYSTOLIC BLOOD PRESSURE: 120 MMHG | DIASTOLIC BLOOD PRESSURE: 80 MMHG | HEART RATE: 60 BPM | BODY MASS INDEX: 26.82 KG/M2 | HEIGHT: 65 IN | TEMPERATURE: 97.7 F

## 2025-03-19 DIAGNOSIS — E78.2 MIXED HYPERLIPIDEMIA: ICD-10-CM

## 2025-03-19 DIAGNOSIS — J43.8 OTHER EMPHYSEMA (HCC): ICD-10-CM

## 2025-03-19 DIAGNOSIS — I10 PRIMARY HYPERTENSION: Primary | ICD-10-CM

## 2025-03-19 DIAGNOSIS — F17.210 SMOKES CIGARETTES: ICD-10-CM

## 2025-03-19 DIAGNOSIS — N52.8 OTHER MALE ERECTILE DYSFUNCTION: ICD-10-CM

## 2025-03-19 DIAGNOSIS — M25.512 CHRONIC LEFT SHOULDER PAIN: ICD-10-CM

## 2025-03-19 DIAGNOSIS — G89.29 CHRONIC LEFT SHOULDER PAIN: ICD-10-CM

## 2025-03-19 DIAGNOSIS — E55.9 VITAMIN D DEFICIENCY: ICD-10-CM

## 2025-03-19 DIAGNOSIS — M25.562 CHRONIC PAIN OF LEFT KNEE: ICD-10-CM

## 2025-03-19 DIAGNOSIS — G89.29 CHRONIC PAIN OF LEFT KNEE: ICD-10-CM

## 2025-03-19 PROCEDURE — 3074F SYST BP LT 130 MM HG: CPT | Performed by: INTERNAL MEDICINE

## 2025-03-19 PROCEDURE — 3017F COLORECTAL CA SCREEN DOC REV: CPT | Performed by: INTERNAL MEDICINE

## 2025-03-19 PROCEDURE — G8427 DOCREV CUR MEDS BY ELIG CLIN: HCPCS | Performed by: INTERNAL MEDICINE

## 2025-03-19 PROCEDURE — 99214 OFFICE O/P EST MOD 30 MIN: CPT | Performed by: INTERNAL MEDICINE

## 2025-03-19 PROCEDURE — 3023F SPIROM DOC REV: CPT | Performed by: INTERNAL MEDICINE

## 2025-03-19 PROCEDURE — 3078F DIAST BP <80 MM HG: CPT | Performed by: INTERNAL MEDICINE

## 2025-03-19 PROCEDURE — G2211 COMPLEX E/M VISIT ADD ON: HCPCS | Performed by: INTERNAL MEDICINE

## 2025-03-19 PROCEDURE — G8419 CALC BMI OUT NRM PARAM NOF/U: HCPCS | Performed by: INTERNAL MEDICINE

## 2025-03-19 PROCEDURE — 1036F TOBACCO NON-USER: CPT | Performed by: INTERNAL MEDICINE

## 2025-03-19 RX ORDER — LABETALOL 300 MG/1
300 TABLET, FILM COATED ORAL DAILY
Qty: 90 TABLET | Refills: 1 | Status: SHIPPED | OUTPATIENT
Start: 2025-03-19

## 2025-03-19 RX ORDER — HYDROCHLOROTHIAZIDE 25 MG/1
25 TABLET ORAL EVERY MORNING
Qty: 90 TABLET | Refills: 1 | Status: SHIPPED | OUTPATIENT
Start: 2025-03-19

## 2025-03-19 RX ORDER — TADALAFIL 5 MG/1
5 TABLET ORAL DAILY PRN
Qty: 90 TABLET | Refills: 0 | Status: SHIPPED | OUTPATIENT
Start: 2025-03-19

## 2025-03-19 RX ORDER — TIOTROPIUM BROMIDE 18 UG/1
CAPSULE ORAL; RESPIRATORY (INHALATION)
Qty: 90 CAPSULE | Refills: 1 | Status: SHIPPED | OUTPATIENT
Start: 2025-03-19

## 2025-03-19 RX ORDER — NAPROXEN 500 MG/1
500 TABLET ORAL 2 TIMES DAILY PRN
Qty: 60 TABLET | Refills: 0 | Status: SHIPPED | OUTPATIENT
Start: 2025-03-19

## 2025-03-19 RX ORDER — ACETAMINOPHEN 160 MG
TABLET,DISINTEGRATING ORAL
Qty: 90 CAPSULE | Refills: 1 | Status: SHIPPED | OUTPATIENT
Start: 2025-03-19

## 2025-03-19 RX ORDER — BUPROPION HYDROCHLORIDE 150 MG/1
150 TABLET ORAL EVERY MORNING
Qty: 90 TABLET | Refills: 1 | Status: SHIPPED | OUTPATIENT
Start: 2025-03-19

## 2025-03-19 RX ORDER — AMLODIPINE BESYLATE 10 MG/1
10 TABLET ORAL DAILY
Qty: 90 TABLET | Refills: 1 | Status: SHIPPED | OUTPATIENT
Start: 2025-03-19

## 2025-03-19 RX ORDER — ALBUTEROL SULFATE 90 UG/1
2 INHALANT RESPIRATORY (INHALATION) EVERY 6 HOURS PRN
Qty: 1 EACH | Refills: 1 | Status: SHIPPED | OUTPATIENT
Start: 2025-03-19

## 2025-03-19 SDOH — ECONOMIC STABILITY: FOOD INSECURITY: WITHIN THE PAST 12 MONTHS, YOU WORRIED THAT YOUR FOOD WOULD RUN OUT BEFORE YOU GOT MONEY TO BUY MORE.: NEVER TRUE

## 2025-03-19 SDOH — ECONOMIC STABILITY: FOOD INSECURITY: WITHIN THE PAST 12 MONTHS, THE FOOD YOU BOUGHT JUST DIDN'T LAST AND YOU DIDN'T HAVE MONEY TO GET MORE.: NEVER TRUE

## 2025-03-19 ASSESSMENT — PATIENT HEALTH QUESTIONNAIRE - PHQ9
SUM OF ALL RESPONSES TO PHQ QUESTIONS 1-9: 0
SUM OF ALL RESPONSES TO PHQ QUESTIONS 1-9: 0
1. LITTLE INTEREST OR PLEASURE IN DOING THINGS: NOT AT ALL
SUM OF ALL RESPONSES TO PHQ QUESTIONS 1-9: 0
SUM OF ALL RESPONSES TO PHQ QUESTIONS 1-9: 0
2. FEELING DOWN, DEPRESSED OR HOPELESS: NOT AT ALL

## 2025-03-19 NOTE — PROGRESS NOTES
2 times daily as needed for Pain Yes Kajal Amezcua MD   acetaminophen (TYLENOL) 500 MG tablet Take 1 tablet by mouth 4 times daily as needed for Pain Yes Martin Miranda MD   metoclopramide (REGLAN) 10 MG tablet Take 1 tablet by mouth 4 times daily WARNING:  May cause drowsiness.  May impair ability to operate vehicles or machinery.  Do not use in combination with alcohol. Yes Martin Miranda MD   buPROPion (WELLBUTRIN XL) 150 MG extended release tablet Take 1 tablet by mouth every morning Yes Kajal Amezcua MD   vitamin D (VITAMIN D3) 50 MCG (2000 UT) CAPS capsule TAKE 1 CAPSULE BY MOUTH EVERY DAY Yes Kajal Amezcua MD   tadalafil (CIALIS) 5 MG tablet Take 1 tablet by mouth daily as needed for Erectile Dysfunction for erectile dysfunction Yes Kajal Amezcua MD   albuterol sulfate HFA (VENTOLIN HFA) 108 (90 Base) MCG/ACT inhaler Inhale 2 puffs into the lungs every 6 hours as needed for Wheezing or Shortness of Breath Yes Kajal Amezcua MD   aspirin (ASPIRIN LOW DOSE) 81 MG chewable tablet TAKE 1 TABLET BY MOUTH EVERY DAY Yes Kajal Amezcua MD   acetaminophen (TYLENOL) 325 MG tablet Take 2 tablets by mouth as needed Yes Provider, MD Marika   nicotine (NICODERM CQ) 14 MG/24HR Place 1 patch onto the skin daily  Patient not taking: Reported on 3/19/2025  Kajal Amezcua MD   meloxicam (MOBIC) 7.5 MG tablet Take 1 tablet by mouth daily as needed for Pain  Kajal Amezcua MD       ROS: COMPREHENSIVE ROS AS IN HX, REST -VE  History obtained from chart review and the patient       OBJECTIVE:   NURSING NOTE AND VITALS REVIEWED  /72   Pulse 55   Ht 1.651 m (5' 5\")   Wt 73 kg (161 lb)   SpO2 97%   BMI 26.79 kg/m²     NO ACUTE DISTRESS    REPEAT BP: 120/80 (LT), NO ORTHOSTASIS     REPEAT PULSE: 60 - MANUAL    TEMP: 97.7F    Body mass index is 26.79 kg/m².     HEENT: NO PALLOR, ANICTERIC, PERRLA, EOMI, NO CONJUNCTIVAL ERYTHEMA,                 NO SINUS TENDERNESS  NECK:  SUPPLE, TRACHEA

## 2025-05-14 DIAGNOSIS — Z72.0 TOBACCO ABUSE: ICD-10-CM

## 2025-05-14 DIAGNOSIS — F12.90 MARIJUANA USE: ICD-10-CM

## 2025-05-14 LAB
AMPHETAMINES UR QL SCN>1000 NG/ML: ABNORMAL
BARBITURATES UR QL SCN>200 NG/ML: ABNORMAL
BENZODIAZ UR QL SCN>200 NG/ML: ABNORMAL
CANNABINOIDS UR QL SCN>50 NG/ML: POSITIVE
COCAINE UR QL SCN: ABNORMAL
DRUG SCREEN COMMENT UR-IMP: ABNORMAL
FENTANYL SCREEN, URINE: ABNORMAL
METHADONE UR QL SCN>300 NG/ML: ABNORMAL
OPIATES UR QL SCN>300 NG/ML: ABNORMAL
OXYCODONE UR QL SCN: ABNORMAL
PCP UR QL SCN>25 NG/ML: ABNORMAL
PH UR STRIP: 7 [PH]

## 2025-05-18 LAB
COTININE SERPL-MCNC: 258 NG/ML
NICOTINE SERPL-MCNC: <5 NG/ML

## 2025-05-19 NOTE — TELEPHONE ENCOUNTER
Left pt a vm asking if the order for CT scan is being requested for his lungs or something else.   Name of Medication(s) Requested:  Requested Prescriptions     Pending Prescriptions Disp Refills    escitalopram (LEXAPRO) 20 MG tablet [Pharmacy Med Name: ESCITALOPRAM 20 MG TABLET] 90 tablet 3     Sig: TAKE 1 TABLET BY MOUTH EVERY DAY       Medication is on current medication list Yes    Dosage and directions were verified? Yes    Quantity verified: 90 day supply     Pharmacy Verified?  Yes    Last Appointment:  5/15/2025    Future appts:  Future Appointments   Date Time Provider Department Center   5/28/2025  1:30 PM Yony Obrien DO BDM GEN SURG [facility]   8/6/2025  9:00 AM Stiven Craven APRN - CNP B[facility] Neuro Neurology -   8/19/2025  2:00 PM Sowmya Villatoro APRN - CNS Surg Weight [facility]   9/26/2025 10:00 AM Silva Lee MD [place] [facility] ECC DEP        (If no appt send self scheduling link. .REFILLAPPT)  Scheduling request sent?     [] Yes  [x] No    Does patient need updated?  [] Yes  [x] No

## 2025-05-20 ENCOUNTER — TELEPHONE (OUTPATIENT)
Dept: ORTHOPEDIC SURGERY | Age: 65
End: 2025-05-20

## 2025-05-29 ENCOUNTER — OFFICE VISIT (OUTPATIENT)
Dept: ORTHOPEDIC SURGERY | Age: 65
End: 2025-05-29
Payer: COMMERCIAL

## 2025-05-29 VITALS — WEIGHT: 161 LBS | HEIGHT: 65 IN | BODY MASS INDEX: 26.82 KG/M2

## 2025-05-29 DIAGNOSIS — Z72.0 TOBACCO ABUSE: ICD-10-CM

## 2025-05-29 DIAGNOSIS — M17.12 ARTHRITIS OF LEFT KNEE: Primary | ICD-10-CM

## 2025-05-29 DIAGNOSIS — F12.90 MARIJUANA USE: ICD-10-CM

## 2025-05-29 PROCEDURE — G8427 DOCREV CUR MEDS BY ELIG CLIN: HCPCS | Performed by: PHYSICIAN ASSISTANT

## 2025-05-29 PROCEDURE — 1036F TOBACCO NON-USER: CPT | Performed by: ORTHOPAEDIC SURGERY

## 2025-05-29 PROCEDURE — 3017F COLORECTAL CA SCREEN DOC REV: CPT | Performed by: ORTHOPAEDIC SURGERY

## 2025-05-29 PROCEDURE — G8419 CALC BMI OUT NRM PARAM NOF/U: HCPCS | Performed by: PHYSICIAN ASSISTANT

## 2025-05-29 PROCEDURE — 99213 OFFICE O/P EST LOW 20 MIN: CPT | Performed by: PHYSICIAN ASSISTANT

## 2025-05-29 NOTE — PROGRESS NOTES
ORTHOPAEDIC OFFICE NOTE    Chief Complaint   Patient presents with    Follow-up     Fu left knee       HPI  5/29/25  FU left knee  Dr. Meza and him have discussed TKA multiple times in the past, but he has been unsuccessful in smoking cessation  Recent cotinine test on 5/14/25 came back positive   He says he hasn't used any tobacco products or marijuana since the beginning of April  However, his aunt bought something off of [employer] for him to take for his pain, but he is not sure what this contains  He presents today to discuss his drug test results and other questions he has about TKA  Still having severe medial pain  He has a long completed 2 visits of physical therapy since his last visit, but states he has been working on exercises at home      1/20/25  Reyes returns to clinic today for follow-up of his left knee  We previously discussed total knee arthroplasty, however the patient was still smoking  He reports he has gone a week without smoking  He did some physical therapy back in June and July of last year  The pain is described as severe, 10 out of 10  Pain medial  He is using Tylenol and doing soaks  He is interested in proceeding with total knee arthroplasty      5/2/24  Reyes returns to clinic today for follow-up of his left knee  He reports persistent pain, rated 10 out of 10  Diffuse, but worse medially  He wishes to proceed with total knee arthroplasty  He reports he stopped smoking 2 weeks ago      7/20/23  Mr. Overton returns to clinic today for follow-up of his left knee  I last saw him over a year ago and discussed total knee arthroplasty  However, we discussed the importance of complete tobacco cessation prior to surgical intervention  The patient saw Dr. Russell a few weeks ago and was referred back to me for total knee arthroplasty discussion  He reports he is cutting down on his smoking, down to 1 cigarette/day  Left knee pain is described as diffuse, but worse medially  Rated 8 out of

## 2025-05-30 NOTE — PLAN OF CARE
[facility] - Outpatient Rehabilitation and Therapy: [address], [place] [address] office: [phone] fax: [phone]     Physical Therapy Initial Evaluation Certification      Dear Devan Meza MD ,    We had the pleasure of evaluating the following patient for physical therapy services at [facility].  A summary of our findings can be found in the initial assessment below.  This includes our plan of care.  If you have any questions or concerns regarding these findings, please do not hesitate to contact me at the office phone number listed above.  Thank you for the referral.     Physician Signature:_______________________________Date:__________________  By signing above (or electronic signature), therapist’s plan is approved by physician [id number]      Physical Therapy: TREATMENT/PROGRESS NOTE   Patient: Reyes Overton (64 y.o. male)   Examination Date: 2025   :  1960 MRN: [id number]   Visit #:   Insurance Allowable Auth Needed   mn [x]Yes    []No after 30     Insurance: Payor: [employer] / Plan: [employer] [place] MEDICAID / Product Type: *No Product type* /   Insurance ID: [id number] - (Medicaid Managed)  Secondary Insurance (if applicable):    Treatment Diagnosis:     ICD-10-CM    1. Difficulty walking  R26.2       2. Decreased functional mobility  R26.89       3. Decreased functional activity tolerance  R68.89       4. Functional weakness  R53.1       5. Decreased ability to perform activities  R68.89       6. Need for home exercise program  Z78.9          Medical Diagnosis:  Arthritis of left knee [M17.12]   Referring Physician: Devan Meza MD  PCP: Kajal Amezcua MD     Plan of care signed (Y/N): routed    Date of Patient follow up with Physician:      Plan of Care Report: EVAL today  POC update due: (10 visits /OR AUTH LIMITS, whichever is less)  2025                                             Medical

## 2025-06-03 ENCOUNTER — HOSPITAL ENCOUNTER (OUTPATIENT)
Dept: PHYSICAL THERAPY | Age: 65
Setting detail: THERAPIES SERIES
Discharge: HOME | End: 2025-06-03
Attending: ORTHOPAEDIC SURGERY
Payer: COMMERCIAL

## 2025-06-03 DIAGNOSIS — R26.89 DECREASED FUNCTIONAL MOBILITY: ICD-10-CM

## 2025-06-03 DIAGNOSIS — R53.1 FUNCTIONAL WEAKNESS: ICD-10-CM

## 2025-06-03 DIAGNOSIS — R68.89 DECREASED FUNCTIONAL ACTIVITY TOLERANCE: ICD-10-CM

## 2025-06-03 DIAGNOSIS — R68.89 DECREASED ABILITY TO PERFORM ACTIVITIES: ICD-10-CM

## 2025-06-03 DIAGNOSIS — Z78.9 NEED FOR HOME EXERCISE PROGRAM: ICD-10-CM

## 2025-06-03 DIAGNOSIS — R26.2 DIFFICULTY WALKING: Primary | ICD-10-CM

## 2025-06-03 PROCEDURE — 97530 THERAPEUTIC ACTIVITIES: CPT | Performed by: PHYSICAL THERAPIST

## 2025-06-03 PROCEDURE — 97110 THERAPEUTIC EXERCISES: CPT | Performed by: PHYSICAL THERAPIST

## 2025-06-03 PROCEDURE — 97161 PT EVAL LOW COMPLEX 20 MIN: CPT | Performed by: PHYSICAL THERAPIST

## 2025-06-05 ENCOUNTER — HOSPITAL ENCOUNTER (OUTPATIENT)
Dept: PHYSICAL THERAPY | Age: 65
Setting detail: THERAPIES SERIES
Discharge: HOME | End: 2025-06-05
Attending: ORTHOPAEDIC SURGERY
Payer: COMMERCIAL

## 2025-06-05 PROCEDURE — 97110 THERAPEUTIC EXERCISES: CPT | Performed by: PHYSICAL THERAPIST

## 2025-06-05 NOTE — FLOWSHEET NOTE
verbal/tactile cueing for HEP and/or activities related to strengthening, flexibility, endurance, ROM performed to prevent loss of range of motion, maintain or improve muscular strength or increase flexibility, following either an injury or surgery.     GOALS     Patient stated goal: \" I want to have less pain and increased function  [] Progressing: [] Met: [] Not Met: [] Adjusted    Therapist goals for Patient:   Short Term Goals: To be achieved in: 4 weeks  Independent in HEP and progression per patient tolerance, in order to prevent re-injury.   [] Progressing: [] Met: [] Not Met: [] Adjusted  Patient will have a decrease in pain to <3/10 to facilitate improvement in movement, function, and ADLs as indicated by Functional Deficits.  [] Progressing: [] Met: [] Not Met: [] Adjusted    IF APPLICABLE:  [] Patient to demonstrate independence in wear and care for custom orthotic device. (Only if applicable for orthotic eval)     Long Term Goals: To be achieved  by d/c  Disability index score of 30 or less for the WOMAC to assist with reaching prior level of function with activities such as iadl's.  [] Progressing: [] Met: [] Not Met: [] Adjusted  Patient will demonstrate increased AROM of left knee to 110--10 without pain to allow for proper joint functioning to enable patient to walk with cane with improved gait.   [] Progressing: [] Met: [] Not Met: [] Adjusted  Patient will demonstrate increased Strength of left le to at least 4+/5 throughout without pain to allow for proper functional mobility to enable patient to return to steps.   [] Progressing: [] Met: [] Not Met: [] Adjusted  Patient will return to walking steps normal  without increased symptoms or restriction.   [] Progressing: [] Met: [] Not Met: [] Adjusted  ' I want to get stronger   [] Progressing: [] Met: [] Not Met: [] Adjusted       Overall Progression Towards Functional goals/ Treatment Progress Update:  [] Patient is progressing as expected towards

## 2025-06-10 ENCOUNTER — HOSPITAL ENCOUNTER (OUTPATIENT)
Dept: PHYSICAL THERAPY | Age: 65
Setting detail: THERAPIES SERIES
Discharge: HOME | End: 2025-06-10
Attending: ORTHOPAEDIC SURGERY
Payer: COMMERCIAL

## 2025-06-10 PROCEDURE — 97110 THERAPEUTIC EXERCISES: CPT | Performed by: PHYSICAL THERAPIST

## 2025-06-10 NOTE — FLOWSHEET NOTE
[facility] - Outpatient Rehabilitation and Therapy: [address], [address], [place] [address] office: [phone] fax: [phone]         Physical Therapy: TREATMENT/PROGRESS NOTE   Patient: Reyes Overton (64 y.o. male)   Examination Date: 06/10/2025   :  1960 MRN: [id number]   Visit #: 3 /12  Insurance Allowable Auth Needed   mn [x]Yes    []No after 30     Insurance: Payor: [employer] / Plan: [employer] [place] MEDICAID / Product Type: *No Product type* /   Insurance ID: [id number] - (Medicaid Managed)  Secondary Insurance (if applicable):    Treatment Diagnosis:     ICD-10-CM    1. Difficulty walking  R26.2       2. Decreased functional mobility  R26.89       3. Decreased functional activity tolerance  R68.89       4. Functional weakness  R53.1       5. Decreased ability to perform activities  R68.89       6. Need for home exercise program  Z78.9          Medical Diagnosis:  Arthritis of left knee [M17.12]   Referring Physician: Devan Meza MD  PCP: Kajal Amezcua MD     Plan of care signed (Y/N): routed    Date of Patient follow up with Physician:      Plan of Care Report: EVAL today  POC update due: (10 visits /OR AUTH LIMITS, whichever is less)  7/10/2025                                             Medical History:  Medical History    Diagnosis Date Comment Source   Arthritis      Cerebral artery occlusion with cerebral infarction (HCC)  ICH 2016    Hypertension         Other Medical History      Diagnosis Date Comment Source   Aneurysm      Smokes cigarettes      Subdural hematoma (HCC)                                                  Precautions/ Contra-indications:           Latex allergy:  NO  Pacemaker:    NO  Contraindications for Manipulation: None  Date of Surgery:   Other:    Red Flags:  None    Suicide Screening:   The patient did not verbalize a primary behavioral concern, suicidal ideation, suicidal intent, or demonstrate suicidal behaviors.    Preferred

## 2025-06-12 ENCOUNTER — HOSPITAL ENCOUNTER (OUTPATIENT)
Dept: PHYSICAL THERAPY | Age: 65
Setting detail: THERAPIES SERIES
Discharge: HOME | End: 2025-06-12
Attending: ORTHOPAEDIC SURGERY
Payer: COMMERCIAL

## 2025-06-12 PROCEDURE — 97110 THERAPEUTIC EXERCISES: CPT | Performed by: PHYSICAL THERAPIST

## 2025-06-12 NOTE — FLOWSHEET NOTE
steps.   [] Progressing: [] Met: [] Not Met: [] Adjusted  Patient will return to walking steps normal  without increased symptoms or restriction.   [] Progressing: [] Met: [] Not Met: [] Adjusted  ' I want to get stronger   [] Progressing: [] Met: [] Not Met: [] Adjusted       Overall Progression Towards Functional goals/ Treatment Progress Update:  [] Patient is progressing as expected towards functional goals listed.    [] Progression is slowed due to complexities/Impairments listed.  [] Progression has been slowed due to co-morbidities.  [x] Plan just implemented, too soon (<30days) to assess goals progression   [] Goals require adjustment due to lack of progress  [] Patient is not progressing as expected and requires additional follow up with physician  [] Other:     TREATMENT PLAN     Frequency/Duration: 2x/week for 8 weeks for the following treatment interventions:    Interventions:  Therapeutic Exercise (95553) including: strength training, ROM, and functional mobility  Therapeutic Activities (53396) including: functional mobility training and education.  Neuromuscular Re-education (65015) activation and proprioception, including postural re-education.    Gait Training (36367) for normalization of ambulation patterns and AD training.   Manual Therapy (72360) as indicated to include: Passive Range of Motion, Gr I-IV mobilizations, Soft Tissue Mobilization, and Myofascial Release  Modalities as needed that may include: NONE  Patient education on joint protection, postural re-education, activity modification, and progression of HEP    Plan: POC initiated as per evaluationLE arom, prom  strength, proprioception, gait, balance, functional activities.  Mfr, joint mobs, mods as needed, hep. Progress as tolerated.  Try kineseo tape   6/5/25  See how he ends up bambi the exs and increase strength exs if he did ok  6/10/25  Add 4\" step up   6/12/25  Try monster walk       Electronically Signed by Bg Merrill, PT

## 2025-06-16 ENCOUNTER — OFFICE VISIT (OUTPATIENT)
Dept: ORTHOPEDIC SURGERY | Age: 65
End: 2025-06-16

## 2025-06-16 VITALS — HEIGHT: 65 IN | BODY MASS INDEX: 29.16 KG/M2 | WEIGHT: 175 LBS

## 2025-06-16 DIAGNOSIS — R20.2 PARESTHESIAS IN LEFT HAND: ICD-10-CM

## 2025-06-16 DIAGNOSIS — M25.512 CHRONIC LEFT SHOULDER PAIN: Primary | ICD-10-CM

## 2025-06-16 DIAGNOSIS — G89.29 CHRONIC LEFT SHOULDER PAIN: Primary | ICD-10-CM

## 2025-06-16 NOTE — PROGRESS NOTES
CHIEF COMPLAINT: Left shoulder pain    History:    Reyes Overton is a 64 y.o. right handed male self-referred for evaluation and treatment of Left shoulder pain.   This is evaluated as a personal injury.   Patient states the pain began 1 year ago.    However he has been seen by multiple orthopedic providers in the past for his left shoulder.  He has had a mass removed from the anterior aspect of his left shoulder when he was a teenager  He saw Dr. Eulalio Nova in April 2017 and was diagnosed with impingement syndrome.  He saw ESTRELLA Pyle also in 2017  He previously saw Dr. Jo for his left shoulder in 2023.  Dr. Jo did order MRI of his shoulder at that time, which was not obtained.  Pain is rated as a 7/10.   There was not an injury.   He points to pain being located laterally.  He also notes neck pain and radicular pain down his arm.  He states that he has numbness and tingling in his 3rd through 5th fingers.  This has been present for months.  Shoulder pain is worse with reaching up overhead and lifting.  He feels weak.  The patient has not had PT recently for the shoulder.  He last had PT in 2023.  The patient has not had a recent injection.  He has had multiple injections in the shoulder in the remote past.  The patient has not tried NSAIDs.  He previously told me that he had been on blood thinners, though nothing is listed in his chart.  The patient has tried ice and heat without relief.       Past Medical History:   Diagnosis Date    Aneurysm     Arthritis     Cerebral artery occlusion with cerebral infarction (HCC)     ICH 2016    Hypertension     Smokes cigarettes     Subdural hematoma (HCC)        Past Surgical History:   Procedure Laterality Date    COLONOSCOPY N/A 4/24/2023    COLONOSCOPY performed by Jasbir Crawley MD at [facility] ENDOSCOPY    EXCISION LESION HAND / FINGER Left 3/28/2019    LEFT HAND MASS EXCISION performed by Jose Nelson MD at [facility] OR    HAND SURGERY Left

## 2025-06-17 ENCOUNTER — HOSPITAL ENCOUNTER (OUTPATIENT)
Dept: PHYSICAL THERAPY | Age: 65
Setting detail: THERAPIES SERIES
Discharge: HOME | End: 2025-06-17
Attending: ORTHOPAEDIC SURGERY
Payer: COMMERCIAL

## 2025-06-17 PROCEDURE — 97110 THERAPEUTIC EXERCISES: CPT | Performed by: PHYSICAL THERAPIST

## 2025-06-17 NOTE — FLOWSHEET NOTE
Language for Healthcare:   [x] English       [] other:    SUBJECTIVE EXAMINATION     Patient stated complaint: Pt is a 63 y/o male with a long hx of left knee pain due to arthritis.  He is interested in getting a tka but is still smoking.  He c/o constant  pain in his left knee that is worse with wb, steps, and squatting.  He says it hasn't given out in  a while.  He wakes from pain.  He does not work. He hopes to get stronger and end up getting a tka. He walks with a cane and does steps 1 at a time.   6/5/25  Pt states, \" Feeling about the same \"   6/10/25  Pt states, \" Things are about the same, I need to get my joint replaced \"   6/12/25  Pt states, \" tight \"  6/17/25  Pt states, \" Sore today, may be the weather \"       Test used Initial score  5/30/25 06/17/2025   Pain Summary VAS 5-7 8   Functional questionnaire WOMAC 51    Other:              Pain:  Pain location: left knee  Patient describes pain to be constant, Sharp, aching, throbbing, shooting, and tender  Pain decreases with: Resting and Ice  Pain increases with: Activity and Movement, Standing, Prolonged standing, Walking, Prolonged walking, Running, Prolonged sitting, Driving, and Stretching     Living status: ind    Current Functional Limitations:    Functional Complaints:  decreased ability to ambulate, squat , and do steps      PLOF:  Pre-existing functional limitations include steps, walking  Pt's sleep is affected?   YES    Occupation/School:  Work/School Status: Unemployed  Job Duties/Demands: NA    Hand Dominance: Right    Sport/ Recreation/ Leisure/ Hobbies:     Review Of Systems (ROS):  [x] Performed Review of systems (Integumentary, CardioPulmonary, Neurological) by intake and observation. Intake form is in the medical record. Patient has been instructed to contact their primary care physician regarding ROS issues if not already being addressed at this time.    [x] Patient history, allergies, meds reviewed. Medical chart reviewed. See intake

## 2025-06-19 ENCOUNTER — HOSPITAL ENCOUNTER (OUTPATIENT)
Dept: PHYSICAL THERAPY | Age: 65
Setting detail: THERAPIES SERIES
Discharge: HOME | End: 2025-06-19
Attending: ORTHOPAEDIC SURGERY
Payer: COMMERCIAL

## 2025-06-19 ENCOUNTER — TELEPHONE (OUTPATIENT)
Dept: ORTHOPEDIC SURGERY | Age: 65
End: 2025-06-19

## 2025-06-19 PROCEDURE — 97110 THERAPEUTIC EXERCISES: CPT | Performed by: PHYSICAL THERAPIST

## 2025-06-19 NOTE — TELEPHONE ENCOUNTER
MRI is authorized for [facility] for patient re: MRI authorization and sent [employer] message. Patient is advised that authorization is valid through 8/15 and to call [phone] to schedule the MRI. An in office appointment will be required a minimum of three business days after the MRI to obtain results and treatment options.

## 2025-06-19 NOTE — FLOWSHEET NOTE
[facility] - Outpatient Rehabilitation and Therapy: [address], [address], [place] [address] office: [phone] fax: [phone]         Physical Therapy: TREATMENT/PROGRESS NOTE   Patient: Reyes Overton (64 y.o. male)   Examination Date: 2025   :  1960 MRN: [id number]   Visit #:   Insurance Allowable Auth Needed   mn [x]Yes    []No after 30     Insurance: Payor: [employer] / Plan: [employer] [place] MEDICAID / Product Type: *No Product type* /   Insurance ID: [id number] - (Medicaid Managed)  Secondary Insurance (if applicable):    Treatment Diagnosis:     ICD-10-CM    1. Difficulty walking  R26.2       2. Decreased functional mobility  R26.89       3. Decreased functional activity tolerance  R68.89       4. Functional weakness  R53.1       5. Decreased ability to perform activities  R68.89       6. Need for home exercise program  Z78.9          Medical Diagnosis:  Arthritis of left knee [M17.12]   Referring Physician: Devan Meza MD  PCP: Kajal Amezcua MD     Plan of care signed (Y/N): routed    Date of Patient follow up with Physician:      Plan of Care Report: EVAL today  POC update due: (10 visits /OR AUTH LIMITS, whichever is less)  2025                                             Medical History:  Medical History    Diagnosis Date Comment Source   Arthritis      Cerebral artery occlusion with cerebral infarction (HCC)  ICH 2016    Hypertension         Other Medical History      Diagnosis Date Comment Source   Aneurysm      Smokes cigarettes      Subdural hematoma (HCC)                                                  Precautions/ Contra-indications:           Latex allergy:  NO  Pacemaker:    NO  Contraindications for Manipulation: None  Date of Surgery:   Other:    Red Flags:  None    Suicide Screening:   The patient did not verbalize a primary behavioral concern, suicidal ideation, suicidal intent, or demonstrate suicidal behaviors.    Preferred

## 2025-06-24 ENCOUNTER — HOSPITAL ENCOUNTER (OUTPATIENT)
Dept: PHYSICAL THERAPY | Age: 65
Setting detail: THERAPIES SERIES
Discharge: HOME | End: 2025-06-24
Attending: ORTHOPAEDIC SURGERY
Payer: COMMERCIAL

## 2025-06-24 PROCEDURE — 97110 THERAPEUTIC EXERCISES: CPT | Performed by: PHYSICAL THERAPIST

## 2025-06-24 NOTE — FLOWSHEET NOTE
[facility] - Outpatient Rehabilitation and Therapy: [address], [address], [place] [address] office: [phone] fax: [phone]         Physical Therapy: TREATMENT/PROGRESS NOTE   Patient: Reyes Overton (64 y.o. male)   Examination Date: 2025   :  1960 MRN: [id number]   Visit #:   Insurance Allowable Auth Needed   mn [x]Yes    []No after 30     Insurance: Payor: [employer] / Plan: [employer] [place] MEDICAID / Product Type: *No Product type* /   Insurance ID: [id number] - (Medicaid Managed)  Secondary Insurance (if applicable):    Treatment Diagnosis:     ICD-10-CM    1. Difficulty walking  R26.2       2. Decreased functional mobility  R26.89       3. Decreased functional activity tolerance  R68.89       4. Functional weakness  R53.1       5. Decreased ability to perform activities  R68.89       6. Need for home exercise program  Z78.9          Medical Diagnosis:  Arthritis of left knee [M17.12]   Referring Physician: Devan Meza MD  PCP: Kajal Amezcua MD     Plan of care signed (Y/N): routed    Date of Patient follow up with Physician:      Plan of Care Report: EVAL today  POC update due: (10 visits /OR AUTH LIMITS, whichever is less)  2025                                             Medical History:  Medical History    Diagnosis Date Comment Source   Arthritis      Cerebral artery occlusion with cerebral infarction (HCC)  ICH 2016    Hypertension         Other Medical History      Diagnosis Date Comment Source   Aneurysm      Smokes cigarettes      Subdural hematoma (HCC)                                                  Precautions/ Contra-indications:           Latex allergy:  NO  Pacemaker:    NO  Contraindications for Manipulation: None  Date of Surgery:   Other:    Red Flags:  None    Suicide Screening:   The patient did not verbalize a primary behavioral concern, suicidal ideation, suicidal intent, or demonstrate suicidal behaviors.    Preferred

## 2025-06-26 ENCOUNTER — PROCEDURE VISIT (OUTPATIENT)
Dept: NEUROLOGY | Age: 65
End: 2025-06-26

## 2025-06-26 ENCOUNTER — HOSPITAL ENCOUNTER (OUTPATIENT)
Dept: PHYSICAL THERAPY | Age: 65
Setting detail: THERAPIES SERIES
Discharge: HOME | End: 2025-06-26
Attending: ORTHOPAEDIC SURGERY
Payer: COMMERCIAL

## 2025-06-26 DIAGNOSIS — R20.0 NUMBNESS AND TINGLING OF LEFT HAND: Primary | ICD-10-CM

## 2025-06-26 DIAGNOSIS — R20.2 NUMBNESS AND TINGLING OF LEFT HAND: Primary | ICD-10-CM

## 2025-06-26 PROCEDURE — 97110 THERAPEUTIC EXERCISES: CPT | Performed by: PHYSICAL THERAPIST

## 2025-06-26 NOTE — PROGRESS NOTES
Dear Daniel Russell MD  [address]  [address]  [place] [address]    I had the pleasure of seeing your patient Reyes Overton  today for EMG and NCV. I have attached a detailed summary below:        Electromyography report        [facility]  [address], [address]  [place] [address]      Patient: Reyes Overton    MR Number: [id number]  YOB: 1960  Date of Visit: 6/26/2025    Clinical history:  The patient is a 64 y.o. years old male with left hand numbness and tingling.  On exam: Mild decrease sensation median distribution distally, no weakness.  No atrophy.    Findings:   For full details about such findings, please see attached report. Needle examination was recorded using monopolar needle in selected muscles. Unless otherwise noted, the temperature of the limb was monitored and maintained between 32-36°C during the performance of the NCV testing.       1.  Left median sensory distal latency and amplitude were within normal limits.  2.  Left ulnar sensory distal latency and amplitude were within normal limits  3.  Left median motor distal latency, amplitude and conduction velocities were within normal limits.  4.  Left ulnar motor distal latency, amplitudes and conduction velocities were within normal limits.  5.  Left radial sensory distal latency and amplitude were within normal limits.  6.  Left median to ulnar palm study showed asymmetric prolongation  of distal latency  7.  Needle examinations of the left upper extremity was performed in selected muscles. Needle examination of these selected muscle showed normal insertional activities, morphology, amplitude, and recruitment of motor unit potential.        Impression:    This test is abnormal.  The electrophysiological pattern showed  evidence of left median compression neuropathy at or distal to the wrist consistent with mild left carpal tunnel syndrome.  No evidence of polyneuropathy, plexopathy, or

## 2025-06-26 NOTE — PATIENT INSTRUCTIONS
Verbal consent was obtained from patient and/or patient's advocate for in office procedure with Dr. Dina Azar MD (EMG or EEG).

## 2025-06-26 NOTE — FLOWSHEET NOTE
NA    Prognosis for POC: [x] Good [] Fair  [] Poor    Patient requires continued skilled intervention: [x] Yes  [] No      CHARGE CAPTURE     PT CHARGE GRID   CPT Code (TIMED) minutes # CPT Code (UNTIMED) #     Therex (36662)  46 3  EVAL:LOW (03358 - Typically 20 minutes face-to-face)     Neuromusc. Re-ed (37755)    Re-Eval (06002)     Manual (87727)    Estim Unattended (64580)     Ther. Act (38883)    Mech. Traction (44761)     Gait (61133)    Dry Needle 1-2 muscle (46812)     Aquatic Therex (05502)    Dry Needle 3+ muscle (02611)     Iontophoresis (20287)    VASO (21474)     Ultrasound (37343)    Group Therapy (43449)     Estim Attended (18017)    Canalith Repositioning (85662)     Physical Performance Test (49849)    Custom orthotic ()     Other:    Other:    Total Timed Code Tx Minutes 46 3       Total Treatment Minutes 46        Charge Justification:  (54874) THERAPEUTIC EXERCISE - Provided verbal/tactile cueing for HEP and/or activities related to strengthening, flexibility, endurance, ROM performed to prevent loss of range of motion, maintain or improve muscular strength or increase flexibility, following either an injury or surgery.     GOALS     Patient stated goal: \" I want to have less pain and increased function  [] Progressing: [] Met: [] Not Met: [] Adjusted    Therapist goals for Patient:   Short Term Goals: To be achieved in: 4 weeks  Independent in HEP and progression per patient tolerance, in order to prevent re-injury.   [x] Progressing: [] Met: [] Not Met: [] Adjusted  Patient will have a decrease in pain to <3/10 to facilitate improvement in movement, function, and ADLs as indicated by Functional Deficits.  [] Progressing: [] Met: [] Not Met: [] Adjusted    IF APPLICABLE:  [] Patient to demonstrate independence in wear and care for custom orthotic device. (Only if applicable for orthotic eval)     Long Term Goals: To be achieved  by d/c  Disability index score of 30 or less for the WOMAC to

## 2025-07-08 ENCOUNTER — HOSPITAL ENCOUNTER (OUTPATIENT)
Dept: MRI IMAGING | Age: 65
Discharge: HOME | End: 2025-07-08
Attending: ORTHOPAEDIC SURGERY
Payer: COMMERCIAL

## 2025-07-08 DIAGNOSIS — M25.512 CHRONIC LEFT SHOULDER PAIN: ICD-10-CM

## 2025-07-08 DIAGNOSIS — G89.29 CHRONIC LEFT SHOULDER PAIN: ICD-10-CM

## 2025-07-08 PROCEDURE — 73221 MRI JOINT UPR EXTREM W/O DYE: CPT

## 2025-07-14 ENCOUNTER — OFFICE VISIT (OUTPATIENT)
Dept: ORTHOPEDIC SURGERY | Age: 65
End: 2025-07-14

## 2025-07-14 VITALS — RESPIRATION RATE: 16 BRPM | BODY MASS INDEX: 28.66 KG/M2 | HEIGHT: 65 IN | WEIGHT: 172 LBS

## 2025-07-14 DIAGNOSIS — G56.02 ACUTE CARPAL TUNNEL SYNDROME OF LEFT WRIST: ICD-10-CM

## 2025-07-14 DIAGNOSIS — M12.812 ROTATOR CUFF ARTHROPATHY OF LEFT SHOULDER: Primary | ICD-10-CM

## 2025-07-14 RX ORDER — BUPIVACAINE HYDROCHLORIDE 2.5 MG/ML
4 INJECTION, SOLUTION INFILTRATION; PERINEURAL ONCE
Status: COMPLETED | OUTPATIENT
Start: 2025-07-14 | End: 2025-07-14

## 2025-07-14 RX ORDER — TRIAMCINOLONE ACETONIDE 40 MG/ML
40 INJECTION, SUSPENSION INTRA-ARTICULAR; INTRAMUSCULAR ONCE
Status: COMPLETED | OUTPATIENT
Start: 2025-07-14 | End: 2025-07-14

## 2025-07-14 RX ORDER — LIDOCAINE HYDROCHLORIDE 10 MG/ML
4 INJECTION, SOLUTION INFILTRATION; PERINEURAL ONCE
Status: COMPLETED | OUTPATIENT
Start: 2025-07-14 | End: 2025-07-14

## 2025-07-14 RX ADMIN — TRIAMCINOLONE ACETONIDE 40 MG: 40 INJECTION, SUSPENSION INTRA-ARTICULAR; INTRAMUSCULAR at 13:45

## 2025-07-14 RX ADMIN — LIDOCAINE HYDROCHLORIDE 4 ML: 10 INJECTION, SOLUTION INFILTRATION; PERINEURAL at 13:44

## 2025-07-14 RX ADMIN — BUPIVACAINE HYDROCHLORIDE 10 MG: 2.5 INJECTION, SOLUTION INFILTRATION; PERINEURAL at 13:44

## (undated) DEVICE — GOWN SIRUS NONREIN XL W/TWL: Brand: MEDLINE INDUSTRIES, INC.

## (undated) DEVICE — MINOR SET UP PK

## (undated) DEVICE — Z DISCONTINUED USE 2275676 GLOVE SURG SZ 65 L12IN FNGR THK87MIL DK GRN LTX FREE ISOLEX

## (undated) DEVICE — ENDOSCOPY KIT: Brand: MEDLINE INDUSTRIES, INC.

## (undated) DEVICE — GLOVE SURG SZ 65 L12IN FNGR THK87MIL WHT LTX FREE

## (undated) DEVICE — UNDERGLOVE SURG SZ 8 FNGR THK0.21MIL GRN LTX BEAD CUF

## (undated) DEVICE — BANDAGE COMPR W4INXL15FT BGE E SGL LAYERED CLP CLSR

## (undated) DEVICE — GLOVE SURG SZ 8 L12IN FNGR THK94MIL STD WHT LTX SYN POLYMER

## (undated) DEVICE — DRESSING PETRO W3XL3IN OIL EMUL N ADH GZ KNIT IMPREG CELOS

## (undated) DEVICE — DRAPE HND W114XL142IN BLU POLYPR W O PCH FEN CRD AND TB HLDR

## (undated) DEVICE — SHEET,DRAPE,53X77,STERILE: Brand: MEDLINE

## (undated) DEVICE — SOLUTION IV IRRIG 250ML ST LF 0.9% SODIUM 2F7122

## (undated) DEVICE — SPONGE GZ W4XL4IN COT 12 PLY TYP VII WVN C FLD DSGN

## (undated) DEVICE — PADDING UNDERCAST W4INXL4YD 100% COT CRIMPED FINISH WBRL II

## (undated) DEVICE — GOWN,SIRUS,POLYRNF,BRTHSLV,XL,30/CS: Brand: MEDLINE

## (undated) DEVICE — SYRINGE MED 10ML LUERLOCK TIP W/O SFTY DISP

## (undated) DEVICE — BANDAGE COMPR W4INXL12FT E DISP ESMARCH EVEN

## (undated) DEVICE — CHLORAPREP 26ML ORANGE

## (undated) DEVICE — ZIMMER® STERILE DISPOSABLE TOURNIQUET CUFF WITH PLC, DUAL PORT, SINGLE BLADDER, 18 IN. (46 CM)

## (undated) DEVICE — COVER LT HNDL BLU PLAS

## (undated) DEVICE — SUTURE CHROMIC GUT SZ 4-0 L27IN ABSRB BRN FS-2 L19MM 3/8 635H